# Patient Record
Sex: MALE | Race: WHITE
[De-identification: names, ages, dates, MRNs, and addresses within clinical notes are randomized per-mention and may not be internally consistent; named-entity substitution may affect disease eponyms.]

---

## 2017-04-20 ENCOUNTER — HOSPITAL ENCOUNTER (EMERGENCY)
Dept: HOSPITAL 83 - ED | Age: 82
Discharge: OTHER INSTITUTION | End: 2017-04-20
Payer: MEDICARE

## 2017-04-20 VITALS — BODY MASS INDEX: 23.03 KG/M2 | WEIGHT: 170 LBS | HEIGHT: 71.97 IN

## 2017-04-20 DIAGNOSIS — M48.56XA: Primary | ICD-10-CM

## 2017-04-20 DIAGNOSIS — Z87.442: ICD-10-CM

## 2017-04-20 DIAGNOSIS — Z79.899: ICD-10-CM

## 2017-04-20 DIAGNOSIS — F03.90: ICD-10-CM

## 2017-04-20 LAB
ALBUMIN SERPL-MCNC: 3.8 GM/DL (ref 3.1–4.5)
ALP SERPL-CCNC: 106 U/L (ref 45–117)
ALT SERPL W P-5'-P-CCNC: 17 U/L (ref 12–78)
AST SERPL-CCNC: 13 IU/L (ref 3–35)
BASOPHILS # BLD AUTO: 0.1 10*3/UL (ref 0–0.1)
BASOPHILS NFR BLD AUTO: 0.7 % (ref 0–1)
BUN SERPL-MCNC: 20 MG/DL (ref 7–24)
CHLORIDE SERPL-SCNC: 110 MMOL/L (ref 98–107)
CO2 SERPL-SCNC: 27 MMOL/L (ref 21–32)
EOSINOPHIL # BLD AUTO: 0.6 10*3/UL (ref 0–0.4)
EOSINOPHIL # BLD AUTO: 4.9 % (ref 1–4)
ERYTHROCYTE [DISTWIDTH] IN BLOOD BY AUTOMATED COUNT: 14.1 % (ref 0–14.5)
GLUCOSE SERPL-MCNC: 90 MG/DL (ref 65–99)
HCT VFR BLD AUTO: 43.3 % (ref 42–52)
HGB BLD-MCNC: 14.8 G/DL (ref 14–18)
IG #: 0.1 10*3/UL (ref 0–0.1)
LYMPHOCYTES # BLD AUTO: 2.6 10*3/UL (ref 1.3–4.4)
LYMPHOCYTES NFR BLD AUTO: 20.7 % (ref 27–41)
MCH RBC QN AUTO: 31.1 PG (ref 27–31)
MCHC RBC AUTO-ENTMCNC: 34.2 G/DL (ref 33–37)
MCV RBC AUTO: 91 FL (ref 80–94)
MONOCYTES # BLD AUTO: 1.5 10*3/UL (ref 0.1–1)
MONOCYTES NFR BLD MANUAL: 11.6 % (ref 3–9)
NEUT #: 7.7 10*3/UL (ref 2.3–7.9)
NEUT %: 61.4 % (ref 47–73)
NRBC BLD QL AUTO: 0 10*3/UL (ref 0–0)
PLATELET # BLD AUTO: 320 10*3/UL (ref 130–400)
PMV BLD AUTO: 9.9 FL (ref 9.6–12.3)
POTASSIUM SERPL-SCNC: 4.4 MMOL/L (ref 3.5–5.1)
PROT SERPL-MCNC: 6.5 GM/DL (ref 6.4–8.2)
RBC # BLD AUTO: 4.76 10*6/UL (ref 4.5–5.9)
SODIUM SERPL-SCNC: 143 MMOL/L (ref 136–145)
WBC NRBC COR # BLD AUTO: 12.6 10*3/UL (ref 4.8–10.8)

## 2017-05-04 ENCOUNTER — HOSPITAL ENCOUNTER (EMERGENCY)
Dept: HOSPITAL 83 - ED | Age: 82
Discharge: HOME | End: 2017-05-04
Payer: MEDICARE

## 2017-05-04 VITALS — HEIGHT: 71.97 IN | WEIGHT: 134 LBS | BODY MASS INDEX: 18.15 KG/M2

## 2017-05-04 DIAGNOSIS — Z79.899: ICD-10-CM

## 2017-05-04 DIAGNOSIS — F03.90: ICD-10-CM

## 2017-05-04 DIAGNOSIS — M54.5: Primary | ICD-10-CM

## 2017-05-04 DIAGNOSIS — Z87.442: ICD-10-CM

## 2017-06-27 ENCOUNTER — HOSPITAL ENCOUNTER (OUTPATIENT)
Dept: HOSPITAL 83 - RAD | Age: 82
Discharge: HOME | End: 2017-06-27
Attending: FAMILY MEDICINE
Payer: MEDICARE

## 2017-06-27 DIAGNOSIS — M80.88XS: Primary | ICD-10-CM
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
## 2017-09-12 VITALS — DIASTOLIC BLOOD PRESSURE: 67 MMHG

## 2017-09-19 ENCOUNTER — HOSPITAL ENCOUNTER (OUTPATIENT)
Dept: HOSPITAL 83 - INJECTION | Age: 82
Discharge: HOME | End: 2017-09-19
Attending: FAMILY MEDICINE
Payer: MEDICARE

## 2017-09-19 VITALS — DIASTOLIC BLOOD PRESSURE: 50 MMHG

## 2017-09-19 DIAGNOSIS — M80.88XS: Primary | ICD-10-CM

## 2018-03-27 ENCOUNTER — HOSPITAL ENCOUNTER (OUTPATIENT)
Dept: HOSPITAL 83 - INJECTION | Age: 83
Discharge: HOME | End: 2018-03-27
Attending: PSYCHIATRY & NEUROLOGY
Payer: MEDICARE

## 2018-03-27 VITALS — DIASTOLIC BLOOD PRESSURE: 66 MMHG
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
## 2018-03-27 DIAGNOSIS — M80.88XS: Primary | ICD-10-CM

## 2018-09-25 ENCOUNTER — HOSPITAL ENCOUNTER (OUTPATIENT)
Dept: HOSPITAL 83 - INJECTION | Age: 83
Discharge: HOME | End: 2018-09-25
Attending: FAMILY MEDICINE
Payer: MEDICARE

## 2018-09-25 VITALS — DIASTOLIC BLOOD PRESSURE: 55 MMHG | SYSTOLIC BLOOD PRESSURE: 104 MMHG

## 2018-09-25 DIAGNOSIS — M81.0: Primary | ICD-10-CM

## 2018-09-25 DIAGNOSIS — F03.90: ICD-10-CM

## 2019-03-26 ENCOUNTER — HOSPITAL ENCOUNTER (OUTPATIENT)
Dept: HOSPITAL 83 - INJECTION | Age: 84
Discharge: HOME | End: 2019-03-26
Attending: FAMILY MEDICINE
Payer: MEDICARE

## 2019-03-26 VITALS — DIASTOLIC BLOOD PRESSURE: 64 MMHG | SYSTOLIC BLOOD PRESSURE: 127 MMHG

## 2019-03-26 DIAGNOSIS — M81.0: Primary | ICD-10-CM

## 2019-09-27 ENCOUNTER — HOSPITAL ENCOUNTER (OUTPATIENT)
Dept: HOSPITAL 83 - INJECTION | Age: 84
Discharge: HOME | End: 2019-09-27
Attending: FAMILY MEDICINE
Payer: MEDICARE

## 2019-09-27 VITALS — DIASTOLIC BLOOD PRESSURE: 71 MMHG

## 2019-09-27 DIAGNOSIS — M81.0: Primary | ICD-10-CM

## 2020-06-07 LAB — URINE CULTURE, ROUTINE: NORMAL

## 2020-07-08 LAB
T4 FREE: 1.5 NG/DL (ref 0.76–1.46)
TSH SERPL DL<=0.05 MIU/L-ACNC: 0.14 UIU/ML (ref 0.36–4.75)
VITAMIN D 25-HYDROXY: 51.8 NG/ML (ref 30–100)

## 2020-07-15 LAB
ALBUMIN: 3 GM/DL (ref 3.1–4.5)
ALP BLD-CCNC: 88 U/L (ref 45–117)
ALT SERPL-CCNC: 10 U/L (ref 12–78)
AST SERPL-CCNC: 11 IU/L (ref 3–35)
BILIRUB SERPL-MCNC: 0.6 MG/DL (ref 0.2–1)
BUN BLDV-MCNC: 16 MG/DL (ref 7–24)
CALCIUM SERPL-MCNC: 8.2 MG/DL (ref 8.5–10.5)
CHLORIDE BLD-SCNC: 109 MMOL/L (ref 98–107)
CHOLESTEROL: 156 MG/DL
CO2: 28 MMOL/L (ref 21–32)
CREAT SERPL-MCNC: 0.76 MG/DL (ref 0.7–1.3)
GFR AFRICAN AMERICAN: > 60 ML/MIN
GFR SERPL CREATININE-BSD FRML MDRD: >60 ML/MIN/
GLUCOSE: 73 MG/DL (ref 65–99)
HCT VFR BLD CALC: 41.1 % (ref 42–52)
HDLC SERPL-MCNC: 40 MG/DL (ref 40–60)
HEMOGLOBIN: 13.3 G/DL (ref 14–18)
LDL CHOLESTEROL: 101 MG/DL (ref 9–159)
MCH RBC QN AUTO: 30.2 PG (ref 27–31)
MCHC RBC AUTO-ENTMCNC: 32.4 G/DL (ref 33–37)
MCV RBC AUTO: 93.2 FL (ref 80–94)
NUCLEATED RED BLOOD CELLS: 0 % (ref 0–0)
PDW BLD-RTO: 13.9 % (ref 0–14.5)
PLATELET # BLD: 321 10*3/UL (ref 130–400)
PMV BLD AUTO: 10.8 FL (ref 9.6–12.3)
POTASSIUM SERPL-SCNC: 3.6 MMOL/L (ref 3.5–5.1)
RBC # BLD: 4.41 10*6/UL (ref 4.5–5.9)
SODIUM BLD-SCNC: 141 MMOL/L (ref 136–145)
T4 FREE: 1.53 NG/DL (ref 0.76–1.46)
TOTAL PROTEIN: 5.7 GM/DL (ref 6.4–8.2)
TRIGL SERPL-MCNC: 77 MG/DL
TSH SERPL DL<=0.05 MIU/L-ACNC: 0.15 UIU/ML (ref 0.36–4.75)
VITAMIN D 25-HYDROXY: 56.5 NG/ML (ref 30–100)
VLDLC SERPL CALC-MCNC: 15 MG/DL (ref 6–40)
WBC # BLD: 8.8 10*3/UL (ref 4.8–10.8)

## 2020-07-30 RX ORDER — TRAMADOL HYDROCHLORIDE 50 MG/1
50 TABLET ORAL EVERY 6 HOURS PRN
Qty: 120 TABLET | Refills: 0 | Status: SHIPPED | OUTPATIENT
Start: 2020-07-30 | End: 2020-08-29

## 2020-07-31 LAB — URINE CULTURE, ROUTINE: NORMAL

## 2020-08-08 ENCOUNTER — HOSPITAL ENCOUNTER (EMERGENCY)
Dept: HOSPITAL 83 - ED | Age: 85
LOS: 1 days | Discharge: OTHER INSTITUTION | End: 2020-08-09
Payer: MEDICARE

## 2020-08-08 VITALS — BODY MASS INDEX: 22.73 KG/M2 | HEIGHT: 67.99 IN | WEIGHT: 150 LBS

## 2020-08-08 DIAGNOSIS — I10: ICD-10-CM

## 2020-08-08 DIAGNOSIS — M10.9: ICD-10-CM

## 2020-08-08 DIAGNOSIS — E86.0: ICD-10-CM

## 2020-08-08 DIAGNOSIS — E03.9: ICD-10-CM

## 2020-08-08 DIAGNOSIS — K59.00: Primary | ICD-10-CM

## 2020-08-08 DIAGNOSIS — Z79.899: ICD-10-CM

## 2020-08-09 LAB
ALBUMIN SERPL-MCNC: 3.2 GM/DL (ref 3.1–4.5)
ALP SERPL-CCNC: 124 U/L (ref 45–117)
ALT SERPL W P-5'-P-CCNC: 15 U/L (ref 12–78)
APPEARANCE UR: CLEAR
AST SERPL-CCNC: 18 IU/L (ref 3–35)
BASOPHILS # BLD AUTO: 0.1 10*3/UL (ref 0–0.1)
BASOPHILS NFR BLD AUTO: 0.6 % (ref 0–1)
BILIRUB UR QL STRIP: NEGATIVE
BUN SERPL-MCNC: 15 MG/DL (ref 7–24)
CHLORIDE SERPL-SCNC: 107 MMOL/L (ref 98–107)
COLOR UR: YELLOW
CREAT SERPL-MCNC: 0.78 MG/DL (ref 0.7–1.3)
EOSINOPHIL # BLD AUTO: 0.4 10*3/UL (ref 0–0.4)
EOSINOPHIL # BLD AUTO: 2.9 % (ref 1–4)
ERYTHROCYTE [DISTWIDTH] IN BLOOD BY AUTOMATED COUNT: 13.9 % (ref 0–14.5)
GLUCOSE UR QL: NEGATIVE
HCT VFR BLD AUTO: 45 % (ref 42–52)
HGB UR QL STRIP: NEGATIVE
KETONES UR QL STRIP: (no result)
LEUKOCYTE ESTERASE UR QL STRIP: NEGATIVE
LYMPHOCYTES # BLD AUTO: 2.6 10*3/UL (ref 1.3–4.4)
LYMPHOCYTES NFR BLD AUTO: 20.1 % (ref 27–41)
MCH RBC QN AUTO: 30 PG (ref 27–31)
MCHC RBC AUTO-ENTMCNC: 33.3 G/DL (ref 33–37)
MCV RBC AUTO: 90 FL (ref 80–94)
MONOCYTES # BLD AUTO: 1.5 10*3/UL (ref 0.1–1)
MONOCYTES NFR BLD MANUAL: 11.3 % (ref 3–9)
NEUT #: 8.3 10*3/UL (ref 2.3–7.9)
NEUT %: 64 % (ref 47–73)
NITRITE UR QL STRIP: NEGATIVE
NRBC BLD QL AUTO: 0 10*3/UL (ref 0–0)
PH UR STRIP: 7 [PH] (ref 5–9)
PLATELET # BLD AUTO: 431 10*3/UL (ref 130–400)
PMV BLD AUTO: 10.1 FL (ref 9.6–12.3)
POTASSIUM SERPL-SCNC: 3.5 MMOL/L (ref 3.5–5.1)
PROT SERPL-MCNC: 6.8 GM/DL (ref 6.4–8.2)
RBC # BLD AUTO: 5 10*6/UL (ref 4.5–5.9)
RBC #/AREA URNS HPF: (no result) RBC/HPF (ref 0–2)
SODIUM SERPL-SCNC: 136 MMOL/L (ref 136–145)
SP GR UR: 1.01 (ref 1–1.03)
UROBILINOGEN UR STRIP-MCNC: 2 E.U./DL (ref 0.2–1)
WBC #/AREA URNS HPF: (no result) WBC/HPF (ref 0–5)
WBC NRBC COR # BLD AUTO: 12.9 10*3/UL (ref 4.8–10.8)

## 2020-08-12 ENCOUNTER — OUTSIDE SERVICES (OUTPATIENT)
Dept: FAMILY MEDICINE CLINIC | Age: 85
End: 2020-08-12
Payer: MEDICARE

## 2020-08-12 ENCOUNTER — HOSPITAL ENCOUNTER (OUTPATIENT)
Dept: HOSPITAL 83 - ED | Age: 85
Setting detail: OBSERVATION
LOS: 2 days | Discharge: NURSING, REHAB OR LONG-TERM CARE | End: 2020-08-14
Attending: STUDENT IN AN ORGANIZED HEALTH CARE EDUCATION/TRAINING PROGRAM | Admitting: STUDENT IN AN ORGANIZED HEALTH CARE EDUCATION/TRAINING PROGRAM
Payer: MEDICARE

## 2020-08-12 VITALS
BODY MASS INDEX: 26.5 KG/M2 | HEART RATE: 72 BPM | HEIGHT: 62 IN | SYSTOLIC BLOOD PRESSURE: 131 MMHG | WEIGHT: 144 LBS | DIASTOLIC BLOOD PRESSURE: 73 MMHG | TEMPERATURE: 98.9 F | RESPIRATION RATE: 18 BRPM

## 2020-08-12 VITALS — WEIGHT: 136.19 LBS | BODY MASS INDEX: 20.64 KG/M2 | HEIGHT: 68 IN

## 2020-08-12 VITALS — DIASTOLIC BLOOD PRESSURE: 78 MMHG | SYSTOLIC BLOOD PRESSURE: 125 MMHG

## 2020-08-12 DIAGNOSIS — Z03.818: Primary | ICD-10-CM

## 2020-08-12 DIAGNOSIS — E55.9: ICD-10-CM

## 2020-08-12 DIAGNOSIS — E87.8: ICD-10-CM

## 2020-08-12 DIAGNOSIS — E83.41: ICD-10-CM

## 2020-08-12 DIAGNOSIS — I10: ICD-10-CM

## 2020-08-12 DIAGNOSIS — D47.3: ICD-10-CM

## 2020-08-12 DIAGNOSIS — M54.5: ICD-10-CM

## 2020-08-12 DIAGNOSIS — E03.9: ICD-10-CM

## 2020-08-12 DIAGNOSIS — D72.89: ICD-10-CM

## 2020-08-12 DIAGNOSIS — E44.0: ICD-10-CM

## 2020-08-12 DIAGNOSIS — E87.6: ICD-10-CM

## 2020-08-12 DIAGNOSIS — M10.9: ICD-10-CM

## 2020-08-12 DIAGNOSIS — F03.90: ICD-10-CM

## 2020-08-12 PROBLEM — Z87.19 HISTORY OF SMALL BOWEL OBSTRUCTION: Status: ACTIVE | Noted: 2020-08-12

## 2020-08-12 PROBLEM — R10.9 CONTINUOUS SEVERE ABDOMINAL PAIN: Status: ACTIVE | Noted: 2020-08-12

## 2020-08-12 PROBLEM — M81.0 OSTEOPOROSIS: Status: ACTIVE | Noted: 2020-08-12

## 2020-08-12 PROBLEM — K59.00 CONSTIPATION: Status: ACTIVE | Noted: 2020-08-12

## 2020-08-12 PROBLEM — Z87.81 HISTORY OF COMPRESSION FRACTURE OF SPINE: Status: ACTIVE | Noted: 2020-08-12

## 2020-08-12 PROBLEM — K57.92 DIVERTICULITIS: Status: ACTIVE | Noted: 2020-08-12

## 2020-08-12 PROBLEM — M54.50 SEVERE LUMBAR PAIN: Status: ACTIVE | Noted: 2020-08-12

## 2020-08-12 LAB
BASOPHILS # BLD AUTO: 1 % (ref 0–1)
BURR CELLS BLD QL SMEAR: (no result)
ERYTHROCYTE [DISTWIDTH] IN BLOOD BY AUTOMATED COUNT: 13.6 % (ref 0–14.5)
HCT VFR BLD AUTO: 44.4 % (ref 42–52)
INR BLD: 1.1 (ref 2–3.5)
MCH RBC QN AUTO: 29.1 PG (ref 27–31)
MCHC RBC AUTO-ENTMCNC: 32.2 G/DL (ref 33–37)
MCV RBC AUTO: 90.2 FL (ref 80–94)
NRBC BLD QL AUTO: 0 10*3/UL (ref 0–0)
PLATELET # BLD AUTO: 498 10*3/UL (ref 130–400)
PLATELET SUFFICIENCY: (no result)
PMV BLD AUTO: 10.2 FL (ref 9.6–12.3)
RBC # BLD AUTO: 4.92 10*6/UL (ref 4.5–5.9)
TOTAL CELLS COUNTED: 100 #CELLS
WBC NRBC COR # BLD AUTO: 16 10*3/UL (ref 4.8–10.8)

## 2020-08-12 ASSESSMENT — ENCOUNTER SYMPTOMS
BACK PAIN: 1
RESPIRATORY NEGATIVE: 1
EYES NEGATIVE: 1
ABDOMINAL PAIN: 1
ABDOMINAL DISTENTION: 1
ALLERGIC/IMMUNOLOGIC NEGATIVE: 1
CONSTIPATION: 1

## 2020-08-12 NOTE — NUR
PATIENT ARRIVED TO ROOM VIA WHEELCHAIR. PATIENT VERY UPSET AND REPEATED
MULTIPLE TIMES "I AM VERY ANGRY AT THE TREATMENT THAT I'M RECEIVING HERE."
WHEN ASKED WHERE HE WAS AT THIS TIME HE WAS UNABLE/UNWILLING TO ANSWER, WHEN
PATIENT WAS ADVISED THAT HE WAS AT [facility] HE BECAME AGGITATED
AND STATED "OH YOU THINK SO DO YOU, WELL YOU GO AHEAD AND BELIEVE THAT."
PATIENT DID ANSWER SOME OF THE ADMISSION QUESTIONS AND CALMED DOWN THEN BECAME
AGGITATED AGAIN WHEN ASKED IF HIS VITAL SIGNS COULD BE TAKEN. PATIENT
COMPLAINING OF ROOM BEING COLD AND WAS SHOWN THE THEMOSTAT WAS TURNED UP AND
WAS PROVIDED WITH WARM BLANKETS. PATIENT WOULD NOT ALLOW RN OR AIDE TO REMOVE
HIS SHIRT TO ASSESS HIS SKIN, BECAME AGGITATED AND STRUCK OUT AT RN AND AIDE
AND STATED TO "LEAVE HIM ALONE, YOU CAN'T LOOK AT WHATEVER YOU WANT I'M VERY
ANGRY THAT YOU KEEP BOTHERING ME." ATTEMPTED TO CALM AND EXPLAIN TO PATIENT
REASON THAT WE CHECK ALL ADMISSIONS FOR WOUNDS HE CONTINUE TO REFUSE. PATIENT
REFUSED TO SIGN ANY OF THE ADMISSION PAPERWORK AT THIS TIME. CALL LIGHT
EXPLAINED TO PATIENT, WILL CONTINUE TO MONITOR.

## 2020-08-12 NOTE — NUR
PT AT NURSE'S STATION REPORTING HE IS UPSET DUE TO BEING IN THE DEPARTMENT FOR
6 HOURS. PT NOTES HE HAS BEEN CALLING OUT FOR HOURS AND HAS HAD NO HELP. PT
REMINDED THAT CALL LIGHT IS ON BED RAILING. PT REORIENTED TO PLACE, TIME, AND
REASON FOR VISIT. PT NOW IN BED, CALL LIGHT WITHIN REACH. WILL CONTINUE TO
MONITOR.

## 2020-08-12 NOTE — NUR
PATIENT REFUSED FLEXERIL AT THIS TIME. STATED "WHAT ARE YOU GOING TO DO KEEP
ME AWAKE ALL NIGHT? WHAT KIND OF PLACE IS THIS?." REORIENTED PATIENT TO PLACE
AND TIME, ADVISED HIM THAT AS THIS WAS A HOSPITAL AND HE HAD JUST RECENTLY
ARRIVED TO THE FLOOR THAT THE DOCTORS WERE ORDERING MEDICATIONS FOR HIS BACK
PAIN AND PER HIS LAB WORK. HE REFUSED AND STATED "I'M NOT TAKING ANY MEDS NOW
OR THE REST OF THE NIGHT, DON'T COME BACK IN HERE OR I WILL THROW THIS WATER
OR WHATEVER I GET A HOLD OF AT YOU." DR. SANCHEZ MADE AWARE. WILL CONTINUE TO
MONITOR.

## 2020-08-12 NOTE — NUR
PATIENT REMOVED IV EARLIER AND WILL NOT ALLOW ANOTHER ONE TO BE STARTED AT
THIS TIME. IV SOLUMEDROL NOT GIVEN. DR. SANCHEZ AWARE.

## 2020-08-12 NOTE — NUR
A 86, admitted to , under the
services of EHSAN Hurd DO with a diagnosis of BACK PAIN, UNABLE TO
AMBULATE.
Chief complaint is LOWER BACK PAIN.
Patient arrived via wheel chair from ER.
Initial assessment completed.
Vital signs REFUSED BY PATIENT.
DR. MALCOLM ALTAMIRANO notified of admission to the unit.
Orders received.
See assessment for past medical history, medications
and allergies.
Patient and/or family oriented to [facility].
visitation policy reviewed.
Clothing/patient valuable form completed.
 
HANNAH DELUCA RN

## 2020-08-12 NOTE — NUR
PATIENT REFUSING TO RADHA RN TO OBTAIN COVID-19 SWAB AT THIS TIME AFTER
EXPLAINING PROCEDURE TO HIM AND REASON IT NEEDS TO BE DONE. DR. SANCHEZ AWARE.

## 2020-08-12 NOTE — PROGRESS NOTES
Subjective:      Patient ID: Hermelinda Penaloza is a 80 y.o. male. Patient seen today as an acute basis per nursing staff. Staff reports that patient had a recent emergency room visit for complaints of severe abdominal and lower back pain. Staff reported that patient returned to the facility with a diagnosis of constipation. Patient has had continuous severe pain in the lower back and abdomen since his return from the emergency department. Patient is taking tramadol, which does help relieve the pain symptoms. Patient complains of constipation and problems moving his bowels. Patient also has significant dementia and has problems with his memory. Review of Systems   Constitutional: Negative. Negative for activity change, appetite change, chills, diaphoresis, fatigue, fever and unexpected weight change. HENT: Negative. Eyes: Negative. Respiratory: Negative. Cardiovascular: Negative. Gastrointestinal: Positive for abdominal distention, abdominal pain and constipation. Endocrine: Negative. Genitourinary: Negative. Musculoskeletal: Positive for back pain. Skin: Negative. Allergic/Immunologic: Negative. Neurological: Negative. Hematological: Negative. Psychiatric/Behavioral: Positive for agitation and confusion. Objective:   Physical Exam  Vitals signs and nursing note reviewed. Constitutional:       General: He is not in acute distress. Appearance: Normal appearance. He is normal weight. He is not ill-appearing, toxic-appearing or diaphoretic. HENT:      Head: Normocephalic. Right Ear: Tympanic membrane, ear canal and external ear normal. There is no impacted cerumen. Left Ear: Tympanic membrane, ear canal and external ear normal. There is no impacted cerumen. Nose: Nose normal. No congestion or rhinorrhea. Mouth/Throat:      Mouth: Mucous membranes are moist.      Pharynx: Oropharynx is clear.  No oropharyngeal exudate or posterior oropharyngeal erythema. Eyes:      General: No scleral icterus. Right eye: No discharge. Left eye: No discharge. Extraocular Movements: Extraocular movements intact. Conjunctiva/sclera: Conjunctivae normal.      Pupils: Pupils are equal, round, and reactive to light. Neck:      Musculoskeletal: Normal range of motion and neck supple. No neck rigidity or muscular tenderness. Vascular: No carotid bruit. Cardiovascular:      Rate and Rhythm: Normal rate and regular rhythm. Pulses: Normal pulses. Heart sounds: Murmur present. No friction rub. No gallop. Pulmonary:      Effort: Pulmonary effort is normal. No respiratory distress. Breath sounds: Normal breath sounds. No stridor. No wheezing, rhonchi or rales. Chest:      Chest wall: No tenderness. Abdominal:      General: Bowel sounds are normal. There is distension. Palpations: Abdomen is soft. There is no mass. Tenderness: There is abdominal tenderness. There is no right CVA tenderness, left CVA tenderness, guarding or rebound. Hernia: No hernia is present. Comments: Softly distended abdomen. Musculoskeletal: Normal range of motion. General: No swelling, tenderness, deformity or signs of injury. Right lower leg: No edema. Left lower leg: No edema. Lymphadenopathy:      Cervical: No cervical adenopathy. Skin:     General: Skin is warm and dry. Capillary Refill: Capillary refill takes less than 2 seconds. Coloration: Skin is not jaundiced or pale. Findings: No bruising, erythema, lesion or rash. Neurological:      General: No focal deficit present. Mental Status: He is alert. He is disoriented. Cranial Nerves: No cranial nerve deficit. Sensory: No sensory deficit. Motor: Weakness present.       Coordination: Coordination normal.      Gait: Gait normal.   Psychiatric:         Behavior: Behavior normal.      Comments: Slightly anxious /73   Pulse 72   Temp 98.9 °F (37.2 °C)   Resp 18   Ht 5' 2\" (1.575 m)   Wt 144 lb (65.3 kg)   BMI 26.34 kg/m²     Assessment:       Diagnosis Orders   1. Continuous severe abdominal pain     2. Severe lumbar pain     3. Constipation, unspecified constipation type     4. History of small bowel obstruction     5. History of compression fracture of spine     6. Dementia without behavioral disturbance, unspecified dementia type ([address]     7. Hypothyroidism, unspecified type     8. Diverticulitis     9. Hypertension, unspecified type     10. Osteoporosis, unspecified osteoporosis type, unspecified pathological fracture presence             Plan:      Reviewed medication and plan of care. Continue medication and plan of care. Xray of the lumbar spine due to pain. KUB of abdomen due to pain. Xrays were ordered stat. Provide pain medication for pain. Monitor for fever, nausea or vomiting. Follow up PRN. Staff to advise Dr Jovan Gambino or NP of any changes or worsening of patient condition.         Martin Christina, CHARITY - CNP

## 2020-08-12 NOTE — NUR
DR. ALTAMIRANO IN ROOM TO ASSESS PATIENT AND HE ADVISED THIS RN THAT THE
PATIENT HAD JUST PULLED OUT HIS IV AND THREW IT ON THE BEDSIDE TABLE. ORDERS
NOT TO REPLACE IV AT THIS TIME. PATIENT WOULD NOT ALLOW RN TO ASSESS SITE FOR
BLEEDING AT THIS TIME. PATIENT STATED "HOW DO YOU EXPECT SOMEONE TO SLEEP WITH
THAT KIND OF STUFF IN THEM? GET OUT OF HERE AND LEAVE ME BE." WILL MONITOR.

## 2020-08-13 VITALS — DIASTOLIC BLOOD PRESSURE: 44 MMHG | SYSTOLIC BLOOD PRESSURE: 111 MMHG

## 2020-08-13 VITALS — DIASTOLIC BLOOD PRESSURE: 74 MMHG | SYSTOLIC BLOOD PRESSURE: 99 MMHG

## 2020-08-13 VITALS — DIASTOLIC BLOOD PRESSURE: 64 MMHG

## 2020-08-13 VITALS — DIASTOLIC BLOOD PRESSURE: 81 MMHG

## 2020-08-13 LAB
ALBUMIN SERPL-MCNC: 3.1 GM/DL (ref 3.1–4.5)
ALP SERPL-CCNC: 122 U/L (ref 45–117)
ALT SERPL W P-5'-P-CCNC: 18 U/L (ref 12–78)
AST SERPL-CCNC: 21 IU/L (ref 3–35)
BUN SERPL-MCNC: 15 MG/DL (ref 7–24)
BUN SERPL-MCNC: 15 MG/DL (ref 7–24)
BURR CELLS BLD QL SMEAR: (no result)
CHLORIDE SERPL-SCNC: 109 MMOL/L (ref 98–107)
CHLORIDE SERPL-SCNC: 109 MMOL/L (ref 98–107)
CREAT SERPL-MCNC: 0.63 MG/DL (ref 0.7–1.3)
CREAT SERPL-MCNC: 0.72 MG/DL (ref 0.7–1.3)
ERYTHROCYTE [DISTWIDTH] IN BLOOD BY AUTOMATED COUNT: 13.8 % (ref 0–14.5)
HCT VFR BLD AUTO: 42.3 % (ref 42–52)
MCH RBC QN AUTO: 29.3 PG (ref 27–31)
MCHC RBC AUTO-ENTMCNC: 32.6 G/DL (ref 33–37)
MCV RBC AUTO: 89.8 FL (ref 80–94)
NRBC BLD QL AUTO: 0 % (ref 0–0)
PLATELET # BLD AUTO: 470 10*3/UL (ref 130–400)
PLATELET SUFFICIENCY: (no result)
PMV BLD AUTO: 10.2 FL (ref 9.6–12.3)
POTASSIUM SERPL-SCNC: 3.2 MMOL/L (ref 3.5–5.1)
POTASSIUM SERPL-SCNC: 3.4 MMOL/L (ref 3.5–5.1)
PROT SERPL-MCNC: 6.7 GM/DL (ref 6.4–8.2)
RBC # BLD AUTO: 4.71 10*6/UL (ref 4.5–5.9)
SODIUM SERPL-SCNC: 140 MMOL/L (ref 136–145)
SODIUM SERPL-SCNC: 143 MMOL/L (ref 136–145)
TOTAL CELLS COUNTED: 100 #CELLS
WBC NRBC COR # BLD AUTO: 13.6 10*3/UL (ref 4.8–10.8)

## 2020-08-13 NOTE — NUR
OT NOTE
Occupational therapy order received and chart reviewed. Attempted to see
patient this AM however per discussion with nurse OT to hold on therapy until
patient eats his breakfast. OTR into room and patient self-feeding with no
difficulty. Will check back at a later time for completion of an OT eval.
Thank you.
 
Julia Hearn, OTR/L

## 2020-08-13 NOTE — NUR
Spoke to son, Dr. Surjit Jamison, regarding discharge planning. Patient is from
[facility]. Discussed short term rehab and he declines. He
wishes for him to return to [facility] with therapy. He has dementia and the
son's concern is sending him to another facility will spiral him. He has been
at [facility] for 3.5 years and the girls there know and can help him and see
how he is progressing. He states he has been in contact with [facility]
prior to patient coming into the hospital and that could potentially be needed
for further assistance at [facility].  and hospitalist nurse
director notified. Son can provide transportation on discharge.

## 2020-08-13 NOTE — NUR
Occupational Therapy evaluation completed on five with full evaluation to
follow.  Recommend occupational therapy per plan of care and SNF upon
discharge.  Thank you for this referral.
 
Julia Hearn OTR/L

## 2020-08-13 NOTE — NUR
IV started right forearm with # protective cath after 1  attempts.
Site prepped with Chloroprep.
Sterile dressing applied. Patient tolerated procedure well.
 
HANNAH PEREZ

## 2020-08-13 NOTE — NUR
PATIENT REFUSED DELTASONE AND K-DUR AT THIS TIME. WOULD NOT ALLOW RN IN THE
ROOM, STATED "I TOLD YOU I DON'T WANT ANYTHING ELSE TONIGHT, GET OUT AND LEAVE
ME SLEEP." WILL MONITOR.

## 2020-08-13 NOTE — NUR
NORCO 5/ 325 MG GIVEN FOR C/O PAIN TO LOWER BACK.PT REQUIRES FREQUENT
REORIENTATION. ENCOURAGED PT TO RETURN TO CHAIR AND REATTACHED CHAIR ALARM. PT
PROVIDED EDUCATION REGARDING REASONS FOR BED/CHAIR ALARMS.

## 2020-08-13 NOTE — NUR
PHYSICAL THERAPY
 
Physical Therapy evaluation completed on 5th floor with full evaluation to
follow.  Recommend physical therapy per plan of care and SNF upon discharge.
Thank you for this referral.
 
Adrian Goncalves SPT
Tania Means PT

## 2020-08-13 NOTE — NUR
NOTIFIED DR SANCHEZ PT RIPPED OUT IV AND THROUGH IT ACROSS THE ROOM AND HIT ME IN
FACE WITH IT.  PT NOW BELLIGERENY. REFUSES TO KEEP ALARM ON AND DESPITE
FREQUENT REORIENTATION. PT EXTREMELY AGITATED.STEPPED OUT OF PT ROOM. PT
QUIETED AT THIS TIME.

## 2020-08-13 NOTE — NUR
PT BECOMING INCREASINGLY CONFUSED AND BELLIGERENT. UNABLE TO REORIENT AT THIS
TIME. FREQUENTLY REMINDED PT TO LOOK AT HIS NOTE IF HE FELT CONFUSED. CHAIR
ALARM INTACT. PT REFUSING TO RETURN TO BED AT THIS TIME. WILL CONTINUE TO
CLOSELY MONITOR.CALL LIGHT WITHIN REACH.

## 2020-08-13 NOTE — NUR
PHYSICAL THERAPY
 
PT evaluation attempted, pt was occupied by eating breakfast at this time.
Will try again at a later time. Thank you.
 
Adrian Goncalves SPT
Tania Means PT

## 2020-08-14 VITALS — DIASTOLIC BLOOD PRESSURE: 76 MMHG

## 2020-08-14 VITALS — DIASTOLIC BLOOD PRESSURE: 70 MMHG

## 2020-08-14 LAB
BUN SERPL-MCNC: 25 MG/DL (ref 7–24)
BURR CELLS BLD QL SMEAR: (no result)
CHLORIDE SERPL-SCNC: 108 MMOL/L (ref 98–107)
CREAT SERPL-MCNC: 0.92 MG/DL (ref 0.7–1.3)
ERYTHROCYTE [DISTWIDTH] IN BLOOD BY AUTOMATED COUNT: 14.1 % (ref 0–14.5)
HCT VFR BLD AUTO: 46.8 % (ref 42–52)
MCH RBC QN AUTO: 29.7 PG (ref 27–31)
MCHC RBC AUTO-ENTMCNC: 32.1 G/DL (ref 33–37)
MCV RBC AUTO: 92.7 FL (ref 80–94)
NRBC BLD QL AUTO: 0 10*3/UL (ref 0–0)
PLATELET # BLD AUTO: 554 10*3/UL (ref 130–400)
PLATELET SUFFICIENCY: (no result)
PMV BLD AUTO: 10.4 FL (ref 9.6–12.3)
POLYCHROMASIA BLD QL SMEAR: SLIGHT
POTASSIUM SERPL-SCNC: 3.6 MMOL/L (ref 3.5–5.1)
RBC # BLD AUTO: 5.05 10*6/UL (ref 4.5–5.9)
SODIUM SERPL-SCNC: 139 MMOL/L (ref 136–145)
TOTAL CELLS COUNTED: 100 #CELLS
WBC NRBC COR # BLD AUTO: 14 10*3/UL (ref 4.8–10.8)

## 2020-08-14 NOTE — NUR
[facility] ambulance here to  pt for dc to [facility]. Report called to
[place].

## 2020-08-14 NOTE — NUR
OCCUPATIONAL THERAPY CO-SIGN
 
I approve of the Occupational Therapy notes written above.
 
SYD LOVETT, OTR/L

## 2020-08-14 NOTE — NUR
PHYSICAL THERAPY CO-SIGN
 
 
I approve of the Physical Therapy notes written above.
 
 
 
Tania Means PT

## 2020-08-14 NOTE — NUR
W NOTIFIED OF PATIENT DISCHARGE. [facility] ARRANGED FOR [facility] TO TRANSPORT
THE PATIENT AT 1PM TODAY. [facility] NOTIFIED  ANA, ANGIES, AND PATIENTS SON
DR. NEENA ONTIVEROS.

## 2020-08-14 NOTE — NUR
Pt was up working with pt. Noted that pt has skin tear to rt arm. Site is dry
with skin flap partially covering. Blood noted but dried. Therapy states the
noted the skin tear prior to therapy.

## 2020-08-14 NOTE — NUR
PHYSICAL THERAPY
TREATMENT TIME:           OUT: 9:17 AM
Patient presented to therapy in supine with head of bed elevated and bed alarm
activated. Patient gives informed consent for treatment. Patient was
identified by name and  on wristband. Patient completed supine to sitting
at EOB with MIN A X 1. Patient sits at EOB with SBA. Patient transferred sit
to stand from EOB with MIN A X 1. Patient ambulated with Wh Walker and CGA for
50' x 1 and then again for 40' x 1 with no LOB. Patient then transferred to
bedside chair with CGA and verbal cues for putting hands back on armrests of
chair. Patient then performed sitting bilateral therapeutic exercises x 10
reps each in all directions for strengthening the LEs in order to improve
patient's functional mobility. Patient was left in bedside chair with LEs in
low position, chair alarm tested and attached to patient and call light within
reach. Patient was 1:1 with PTA for 19 minutes total.
                                            TIFFANY BATRES PTA

## 2020-08-14 NOTE — NUR
OT NOTE
Pt was seen this A.M. 1:1 for 15 minute OT session. Upon arrival pt was supine
in bed. Pt identified by name and  and had complaints of low back pain
which he was unable to rate on 0-10 pain scale. Pt transferred supine to sit
EOB with Harsh for assist with UB. Sit to stand completed from bed level with
CGA and use of w/w for UE support. Functional mobility was then completed to
the bathroom with CGA and use of w/w. There he transferred on/off standard
commode with Harsh due to low surface. He then stood sink side while washing
his hands with CGA, pt had one minor LOB that was able to be self corrected.
Functional mobility completed back to the recliner where he was left sitting
upright with call light in hand, tray table in place, and body alarm activated
for safety. Continue with rec D/C plan to SNF.
 
SERA Glez/L

## 2020-08-19 LAB
AMMONIA: < 10 UMOL/L (ref 11–32)
VITAMIN B-12: 473 PG/ML (ref 247–911)

## 2020-08-20 ENCOUNTER — OUTSIDE SERVICES (OUTPATIENT)
Dept: FAMILY MEDICINE CLINIC | Age: 85
End: 2020-08-20
Payer: MEDICARE

## 2020-08-20 VITALS
HEART RATE: 72 BPM | WEIGHT: 144 LBS | DIASTOLIC BLOOD PRESSURE: 73 MMHG | RESPIRATION RATE: 18 BRPM | HEIGHT: 68 IN | BODY MASS INDEX: 21.82 KG/M2 | TEMPERATURE: 98.9 F | SYSTOLIC BLOOD PRESSURE: 131 MMHG

## 2020-08-20 LAB
ALBUMIN: 2.6 GM/DL (ref 3.1–4.5)
ALP BLD-CCNC: 93 U/L (ref 45–117)
ALT SERPL-CCNC: 12 U/L (ref 12–78)
AST SERPL-CCNC: 9 IU/L (ref 3–35)
BILIRUB SERPL-MCNC: 0.4 MG/DL (ref 0.2–1)
BUN BLDV-MCNC: 17 MG/DL (ref 7–24)
CALCIUM SERPL-MCNC: 8.3 MG/DL (ref 8.5–10.5)
CHLORIDE BLD-SCNC: 107 MMOL/L (ref 98–107)
CO2: 29 MMOL/L (ref 21–32)
CREAT SERPL-MCNC: 0.67 MG/DL (ref 0.7–1.3)
GFR AFRICAN AMERICAN: > 60 ML/MIN
GFR SERPL CREATININE-BSD FRML MDRD: >60 ML/MIN/
GLUCOSE: 84 MG/DL (ref 65–99)
HCT VFR BLD CALC: 38.6 % (ref 42–52)
HEMOGLOBIN: 12.6 G/DL (ref 14–18)
MCH RBC QN AUTO: 29.7 PG (ref 27–31)
MCHC RBC AUTO-ENTMCNC: 32.6 G/DL (ref 33–37)
MCV RBC AUTO: 91 FL (ref 80–94)
NUCLEATED RED BLOOD CELLS: 0 % (ref 0–0)
PDW BLD-RTO: 13.7 % (ref 0–14.5)
PLATELET # BLD: 473 10*3/UL (ref 130–400)
PMV BLD AUTO: 10.4 FL (ref 9.6–12.3)
POTASSIUM SERPL-SCNC: 3.3 MMOL/L (ref 3.5–5.1)
RBC # BLD: 4.24 10*6/UL (ref 4.5–5.9)
SODIUM BLD-SCNC: 140 MMOL/L (ref 136–145)
TOTAL PROTEIN: 5.4 GM/DL (ref 6.4–8.2)
WBC # BLD: 12.9 10*3/UL (ref 4.8–10.8)

## 2020-08-20 ASSESSMENT — ENCOUNTER SYMPTOMS
ALLERGIC/IMMUNOLOGIC NEGATIVE: 1
EYES NEGATIVE: 1
RESPIRATORY NEGATIVE: 1
CONSTIPATION: 1
BACK PAIN: 1

## 2020-08-20 NOTE — PROGRESS NOTES
Subjective:      Patient ID: Evangelista South is a 80 y.o. male. Patient seen today as an acute basis per request of nursing staff. Staff reports that patient has been experiencing severe lower back pain. Patient states Tramadol is ineffective. Family is requesting MRI. Patient also having more confusion per staff. Patient recently was released from the [facility] being admitted on 8/12/2020 and discharged 8/14/2020. Patient on new medications Prednisone and Flexeril. Review of Systems   Constitutional: Negative. Negative for activity change, appetite change, chills, diaphoresis, fatigue, fever and unexpected weight change. HENT: Negative. Eyes: Negative. Respiratory: Negative. Cardiovascular: Negative. Gastrointestinal: Positive for constipation. Endocrine: Negative. Genitourinary: Negative. Musculoskeletal: Positive for back pain and gait problem. Skin: Negative. Allergic/Immunologic: Negative. Neurological: Positive for weakness. Hematological: Negative. Psychiatric/Behavioral: Positive for confusion. Objective:   Physical Exam  Vitals signs and nursing note reviewed. Constitutional:       General: He is not in acute distress. Appearance: Normal appearance. He is normal weight. He is not ill-appearing, toxic-appearing or diaphoretic. HENT:      Head: Normocephalic. Right Ear: Tympanic membrane, ear canal and external ear normal. There is no impacted cerumen. Left Ear: Tympanic membrane, ear canal and external ear normal. There is no impacted cerumen. Nose: Nose normal. No congestion or rhinorrhea. Mouth/Throat:      Mouth: Mucous membranes are dry. Pharynx: Oropharynx is clear. Posterior oropharyngeal erythema present. No oropharyngeal exudate. Eyes:      General: No scleral icterus. Right eye: No discharge. Left eye: No discharge. Extraocular Movements: Extraocular movements intact. Conjunctiva/sclera: Conjunctivae normal.      Pupils: Pupils are equal, round, and reactive to light. Neck:      Musculoskeletal: Normal range of motion and neck supple. No neck rigidity or muscular tenderness. Vascular: No carotid bruit. Cardiovascular:      Rate and Rhythm: Normal rate and regular rhythm. Pulses: Normal pulses. Heart sounds: Normal heart sounds. No murmur. No friction rub. No gallop. Pulmonary:      Effort: Pulmonary effort is normal. No respiratory distress. Breath sounds: Normal breath sounds. No stridor. No wheezing, rhonchi or rales. Chest:      Chest wall: No tenderness. Abdominal:      General: Abdomen is flat. Bowel sounds are normal. There is no distension. Palpations: Abdomen is soft. There is no mass. Tenderness: There is no abdominal tenderness. There is no right CVA tenderness, left CVA tenderness, guarding or rebound. Hernia: No hernia is present. Musculoskeletal: Normal range of motion. General: Tenderness present. No swelling, deformity or signs of injury. Right lower leg: No edema. Left lower leg: No edema. Comments: Tenderness to lower back. Reports lower back pain   Lymphadenopathy:      Cervical: No cervical adenopathy. Skin:     General: Skin is warm and dry. Capillary Refill: Capillary refill takes less than 2 seconds. Coloration: Skin is not jaundiced or pale. Findings: No bruising, erythema, lesion or rash. Neurological:      General: No focal deficit present. Mental Status: He is alert. He is disoriented. Cranial Nerves: No cranial nerve deficit. Sensory: No sensory deficit. Motor: Weakness present. Coordination: Coordination abnormal.      Gait: Gait abnormal.   Psychiatric:         Mood and Affect: Mood normal.      Comments: Confusion.        /73   Pulse 72   Temp 98.9 °F (37.2 °C)   Resp 18   Ht 5' 8\" (1.727 m)   Wt 144 lb (65.3 kg)   BMI 21.90 kg/m²       Assessment:       Diagnosis Orders   1. Severe lumbar pain     2. History of compression fracture of spine     3. Dementia without behavioral disturbance, unspecified dementia type ([address]     4. Hypothyroidism, unspecified type     5. Osteoporosis, unspecified osteoporosis type, unspecified pathological fracture presence     6. Constipation, unspecified constipation type     7. History of small bowel obstruction             Plan:      Reviewed medication and plan of care. Begin Norco 5/325 mg PO every 8 hours as needed for pain. D/C tramadol once norco is available. [facility] for MRI to lower back due to pain. Ok to refer to ortho of family choice or Dr Kenny Ibarra for back pain. CBC, CMP and U/A C&S due to confusion. Follow up in one week. Advised staff to contact Dr Grace Prater or NP if any change in condition.         Renetta Ugarte, APRN - CNP

## 2020-08-21 RX ORDER — ERGOCALCIFEROL 1.25 MG/1
CAPSULE ORAL
COMMUNITY
Start: 2020-07-15 | End: 2020-08-21 | Stop reason: SDUPTHER

## 2020-08-24 LAB — URINE CULTURE, ROUTINE: NORMAL

## 2020-08-25 RX ORDER — ERGOCALCIFEROL 1.25 MG/1
50000 CAPSULE ORAL WEEKLY
Qty: 5 CAPSULE | Refills: 5 | Status: ON HOLD
Start: 2020-08-25 | End: 2022-03-15

## 2020-08-25 RX ORDER — SENNOSIDES 8.6 MG
1300 CAPSULE ORAL DAILY
Qty: 62 TABLET | Refills: 5 | Status: ON HOLD
Start: 2020-08-25 | End: 2022-03-16 | Stop reason: HOSPADM

## 2020-08-26 LAB — URIC ACID: 4.1 MG/DL (ref 3.5–7.2)

## 2020-09-03 ENCOUNTER — TELEPHONE (OUTPATIENT)
Dept: FAMILY MEDICINE CLINIC | Age: 85
End: 2020-09-03

## 2020-09-03 NOTE — TELEPHONE ENCOUNTER
Tommie Paredes called saying she called 1-2 weeks ago asking if we have done PA for pt. MRI?     When we get it let her know so she can schedule it at [facility].

## 2020-09-09 LAB
BUN BLDV-MCNC: 17 MG/DL (ref 7–24)
CALCIUM SERPL-MCNC: 8.6 MG/DL (ref 8.5–10.5)
CHLORIDE BLD-SCNC: 107 MMOL/L (ref 98–107)
CO2: 28 MMOL/L (ref 21–32)
CREAT SERPL-MCNC: 0.64 MG/DL (ref 0.7–1.3)
GFR AFRICAN AMERICAN: > 60 ML/MIN
GFR SERPL CREATININE-BSD FRML MDRD: >60 ML/MIN/
GLUCOSE: 91 MG/DL (ref 65–99)
POTASSIUM SERPL-SCNC: 3.7 MMOL/L (ref 3.5–5.1)
SODIUM BLD-SCNC: 139 MMOL/L (ref 136–145)

## 2020-09-21 RX ORDER — LEVOTHYROXINE SODIUM 0.1 MG/1
100 TABLET ORAL DAILY
Qty: 31 TABLET | Refills: 5 | Status: ON HOLD
Start: 2020-09-21 | End: 2022-03-15

## 2020-09-21 RX ORDER — LEVOTHYROXINE SODIUM 0.1 MG/1
TABLET ORAL
COMMUNITY
Start: 2020-09-15 | End: 2020-09-21 | Stop reason: SDUPTHER

## 2020-09-21 RX ORDER — POTASSIUM CHLORIDE 750 MG/1
CAPSULE, EXTENDED RELEASE ORAL
COMMUNITY
Start: 2020-09-15 | End: 2020-09-21 | Stop reason: SDUPTHER

## 2020-09-21 RX ORDER — POTASSIUM CHLORIDE 750 MG/1
10 CAPSULE, EXTENDED RELEASE ORAL DAILY
Qty: 31 CAPSULE | Refills: 5 | Status: ON HOLD
Start: 2020-09-21 | End: 2022-03-15

## 2020-09-24 ENCOUNTER — TELEPHONE (OUTPATIENT)
Dept: FAMILY MEDICINE CLINIC | Age: 85
End: 2020-09-24

## 2020-09-24 NOTE — TELEPHONE ENCOUNTER
I had [facility] to see if the pt was going to have the MRI done on his shoulder. Per Fabian Mcleod, the pt and his son has decided to not have the procedure done. Fabian Fergusonkary states that the pt's pain has gotten better and the son doesn't want to put the pt through all of that and then quarantine. Please advise if you would like to just cancel the order.

## 2020-09-24 NOTE — PROGRESS NOTES
I have received a fax back from [facility] in regards to the patient's MRI that was scheduled on 9/17/2020. The pt apparently did not go to the appointment. I called [facility] and am waiting on a return call from the pt's nurse.

## 2020-10-01 RX ORDER — MENTHOL 40 MG/ML
GEL TOPICAL
Qty: 89 ML | Refills: 5 | Status: SHIPPED
Start: 2020-10-01 | End: 2022-08-04

## 2020-10-01 RX ORDER — MENTHOL 40 MG/ML
GEL TOPICAL
COMMUNITY
End: 2020-10-01 | Stop reason: SDUPTHER

## 2020-10-05 RX ORDER — CYCLOBENZAPRINE HCL 10 MG
10 TABLET ORAL 2 TIMES DAILY
Qty: 62 TABLET | Refills: 5 | Status: ON HOLD
Start: 2020-10-05 | End: 2022-03-15

## 2020-10-05 RX ORDER — CYCLOBENZAPRINE HCL 10 MG
10 TABLET ORAL 2 TIMES DAILY
COMMUNITY
Start: 2020-08-14 | End: 2020-10-05 | Stop reason: SDUPTHER

## 2020-10-11 ENCOUNTER — HOSPITAL ENCOUNTER (INPATIENT)
Dept: HOSPITAL 83 - ED | Age: 85
LOS: 5 days | Discharge: OTHER INSTITUTION | DRG: 183 | End: 2020-10-16
Attending: STUDENT IN AN ORGANIZED HEALTH CARE EDUCATION/TRAINING PROGRAM | Admitting: STUDENT IN AN ORGANIZED HEALTH CARE EDUCATION/TRAINING PROGRAM
Payer: MEDICARE

## 2020-10-11 VITALS — DIASTOLIC BLOOD PRESSURE: 67 MMHG | SYSTOLIC BLOOD PRESSURE: 116 MMHG

## 2020-10-11 VITALS — HEIGHT: 68.11 IN | BODY MASS INDEX: 19.7 KG/M2 | WEIGHT: 130 LBS

## 2020-10-11 VITALS — DIASTOLIC BLOOD PRESSURE: 75 MMHG | SYSTOLIC BLOOD PRESSURE: 138 MMHG

## 2020-10-11 VITALS — SYSTOLIC BLOOD PRESSURE: 119 MMHG | DIASTOLIC BLOOD PRESSURE: 77 MMHG

## 2020-10-11 VITALS — DIASTOLIC BLOOD PRESSURE: 75 MMHG

## 2020-10-11 VITALS — SYSTOLIC BLOOD PRESSURE: 129 MMHG | DIASTOLIC BLOOD PRESSURE: 79 MMHG

## 2020-10-11 VITALS — DIASTOLIC BLOOD PRESSURE: 74 MMHG
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
## 2020-10-11 DIAGNOSIS — Z51.5: ICD-10-CM

## 2020-10-11 DIAGNOSIS — F03.91: ICD-10-CM

## 2020-10-11 DIAGNOSIS — M54.9: ICD-10-CM

## 2020-10-11 DIAGNOSIS — E87.8: ICD-10-CM

## 2020-10-11 DIAGNOSIS — M10.9: ICD-10-CM

## 2020-10-11 DIAGNOSIS — Z66: ICD-10-CM

## 2020-10-11 DIAGNOSIS — J96.01: ICD-10-CM

## 2020-10-11 DIAGNOSIS — I10: ICD-10-CM

## 2020-10-11 DIAGNOSIS — Z20.828: ICD-10-CM

## 2020-10-11 DIAGNOSIS — E83.41: ICD-10-CM

## 2020-10-11 DIAGNOSIS — K57.90: ICD-10-CM

## 2020-10-11 DIAGNOSIS — R73.9: ICD-10-CM
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
## 2020-10-11 DIAGNOSIS — E44.0: ICD-10-CM

## 2020-10-11 DIAGNOSIS — N30.00: ICD-10-CM

## 2020-10-11 DIAGNOSIS — S32.9XXA: ICD-10-CM

## 2020-10-11 DIAGNOSIS — S22.41XA: Primary | ICD-10-CM

## 2020-10-11 DIAGNOSIS — R65.11: ICD-10-CM

## 2020-10-11 DIAGNOSIS — G89.29: ICD-10-CM

## 2020-10-11 DIAGNOSIS — E03.9: ICD-10-CM
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
## 2020-10-11 LAB
ALBUMIN SERPL-MCNC: 3.2 GM/DL (ref 3.1–4.5)
ALP SERPL-CCNC: 160 U/L (ref 45–117)
ALT SERPL W P-5'-P-CCNC: 15 U/L (ref 12–78)
APTT PPP: 29.5 SECONDS (ref 20–32.1)
AST SERPL-CCNC: 18 IU/L (ref 3–35)
BACTERIA #/AREA URNS HPF: (no result) /[HPF]
BILIRUB UR QL STRIP: (no result)
BUN SERPL-MCNC: 17 MG/DL (ref 7–24)
CHLORIDE SERPL-SCNC: 109 MMOL/L (ref 98–107)
CREAT SERPL-MCNC: 0.71 MG/DL (ref 0.7–1.3)
CRYSTALS URNS MICRO: (no result)
EPI CELLS #/AREA URNS HPF: (no result) /[HPF]
ERYTHROCYTE [DISTWIDTH] IN BLOOD BY AUTOMATED COUNT: 15 % (ref 0–14.5)
HCT VFR BLD AUTO: 44.2 % (ref 42–52)
INR BLD: 1.1 (ref 2–3.5)
LEUKOCYTE ESTERASE UR QL STRIP: (no result)
MCH RBC QN AUTO: 29.7 PG (ref 27–31)
MCHC RBC AUTO-ENTMCNC: 32.4 G/DL (ref 33–37)
MCV RBC AUTO: 91.9 FL (ref 80–94)
MUCOUS THREADS URNS QL MICRO: (no result)
NRBC BLD QL AUTO: 0 10*3/UL (ref 0–0)
PH UR STRIP: 5 [PH] (ref 4.5–8)
PLATELET # BLD AUTO: 356 10*3/UL (ref 130–400)
PLATELET SUFFICIENCY: NORMAL
PMV BLD AUTO: 10.7 FL (ref 9.6–12.3)
POTASSIUM SERPL-SCNC: 3.8 MMOL/L (ref 3.5–5.1)
PROT SERPL-MCNC: 6.5 GM/DL (ref 6.4–8.2)
RBC # BLD AUTO: 4.81 10*6/UL (ref 4.5–5.9)
RBC #/AREA URNS HPF: (no result) RBC/HPF (ref 0–2)
SODIUM SERPL-SCNC: 142 MMOL/L (ref 136–145)
SP GR UR: 1.02 (ref 1–1.03)
TOTAL CELLS COUNTED: 100 #CELLS
TROPONIN I SERPL-MCNC: < 0.015 NG/ML (ref ?–0.04)
UROBILINOGEN UR STRIP-MCNC: 1 E.U./DL (ref 0–1)
WBC NRBC COR # BLD AUTO: 21.6 10*3/UL (ref 4.8–10.8)

## 2020-10-11 NOTE — NUR
PT PULLED OUT HIS IV AND PULLED OFF HIS MONITOR LEADS.HE IS CONFUSED. HX
DEMENTIA. PT REORIENTED. NEW IV INSERTED AND WRAPPED WITH NEW---ERIN MARIE RN

## 2020-10-12 VITALS — SYSTOLIC BLOOD PRESSURE: 132 MMHG | DIASTOLIC BLOOD PRESSURE: 76 MMHG

## 2020-10-12 VITALS — DIASTOLIC BLOOD PRESSURE: 75 MMHG | SYSTOLIC BLOOD PRESSURE: 138 MMHG

## 2020-10-12 VITALS — DIASTOLIC BLOOD PRESSURE: 93 MMHG

## 2020-10-12 VITALS — DIASTOLIC BLOOD PRESSURE: 70 MMHG

## 2020-10-12 VITALS — SYSTOLIC BLOOD PRESSURE: 124 MMHG | DIASTOLIC BLOOD PRESSURE: 86 MMHG

## 2020-10-12 VITALS — DIASTOLIC BLOOD PRESSURE: 85 MMHG

## 2020-10-12 VITALS — DIASTOLIC BLOOD PRESSURE: 70 MMHG | SYSTOLIC BLOOD PRESSURE: 126 MMHG

## 2020-10-12 LAB
ALBUMIN SERPL-MCNC: 2.9 GM/DL (ref 3.1–4.5)
ALP SERPL-CCNC: 143 U/L (ref 45–117)
ALT SERPL W P-5'-P-CCNC: 11 U/L (ref 12–78)
AST SERPL-CCNC: 17 IU/L (ref 3–35)
BUN SERPL-MCNC: 16 MG/DL (ref 7–24)
BURR CELLS BLD QL SMEAR: (no result)
CHLORIDE SERPL-SCNC: 111 MMOL/L (ref 98–107)
CREAT SERPL-MCNC: 0.56 MG/DL (ref 0.7–1.3)
ERYTHROCYTE [DISTWIDTH] IN BLOOD BY AUTOMATED COUNT: 15.4 % (ref 0–14.5)
HCT VFR BLD AUTO: 42 % (ref 42–52)
MCH RBC QN AUTO: 29.2 PG (ref 27–31)
MCHC RBC AUTO-ENTMCNC: 31.7 G/DL (ref 33–37)
MCV RBC AUTO: 92.3 FL (ref 80–94)
NRBC BLD QL AUTO: 0 % (ref 0–0)
PLATELET # BLD AUTO: 361 10*3/UL (ref 130–400)
PLATELET SUFFICIENCY: NORMAL
PMV BLD AUTO: 11.4 FL (ref 9.6–12.3)
POTASSIUM SERPL-SCNC: 3.8 MMOL/L (ref 3.5–5.1)
PROT SERPL-MCNC: 6.2 GM/DL (ref 6.4–8.2)
RBC # BLD AUTO: 4.55 10*6/UL (ref 4.5–5.9)
SODIUM SERPL-SCNC: 142 MMOL/L (ref 136–145)
TOTAL CELLS COUNTED: 100 #CELLS
VITAMIN B12: 745 PG/ML (ref 247–911)
WBC NRBC COR # BLD AUTO: 18.7 10*3/UL (ref 4.8–10.8)

## 2020-10-12 NOTE — NUR
Time: 0848
A 86  year old MALE admitted to 
under services of MARIPOSA ALMEIDA DO.
Pt. arrived via stretcher from
[facility].  Chief complaint: SHORTNESS OF BREATH AND RIB PAIN..
 
ALEXA HUMMEL

## 2020-10-12 NOTE — NUR
PATIENT IN BED RESTING EYES AT THIS TIME. NO DISTRESS NOTED. RESP EASY AND
NONLABORED. RN WILL CONT TO MONITOR

## 2020-10-12 NOTE — NUR
Occupational Therapy evaluation completed on 4 with full eval to follow.
Precautions include fall risk; bed alarm,+2 transfer and ADL assist, dementia,
high complexity level 02843. Recommend OT per POC and SNF to enable return to
[facility]. Thank you for this referral.
Montse Lal OTR/l

## 2020-10-12 NOTE — NUR
PATIENT BECAME VERY AGGITATED AND COMBATIVE WITH TRANSPORT PERSON, REFUSED CT.
CALL PLACED TO [facility] LINE,
SPOKE TO DR. SANCHEZ, HE VERSED THAT PATIENT IS ASSIGNED TO JHOAN DAVIES NP AND
HE WOULD LET HER KNOW.

## 2020-10-12 NOTE — NUR
NOTIFIED OF DNR PAPERWORK FROM [place] STATING PT IS A DNR ARREST.
ORDER IN COMPUTER CURRENTLY STATES PT IS A FULL CODE.  TO CHANGE
ORDER.

## 2020-10-12 NOTE — NUR
PT IS AWAKE AND SLIGHTLY CONFUSED, PT IS COOPERTIVE. PT DENIES PAIN OR SOB.
REPOSITION FOR COMFORT, BREAKFAST TRAY ORDERED. CALL LIGHT IN REACH. WOUND CARE
AT THE BEDSIDE.

## 2020-10-12 NOTE — NUR
PT RESIDES AT [facility] AT THIS TIME.  WILL CONTINUE TO
FOLLOW.  WILL NEED COVID PRIOR TO RETURN.

## 2020-10-12 NOTE — NUR
PATIENT AWAKE AND USING URINAL AT THIS TIME. NO DISTRESS NOTED. RESP EASY AND
NONLABORED. RN WILL CONT TO MONITOR

## 2020-10-12 NOTE — NUR
PATIENT VERY CONFUSED AT THIS TIME, ALERT TO SELF. RESP EASY AND NONLABORED.
GIVEN WATER TO DRINK. NO ACUTE DISTRESS NOTED. RN WILL CONT TO MONITOR

## 2020-10-12 NOTE — NUR
PATIENT IN BED RESTING EYES AT THIS TIME. NO DISTRESS NOTED. RESP EASY AND
NONLABORED, RN WILL CONT TO MONITOR

## 2020-10-12 NOTE — NUR
PATIENT CRIES OUT, AND VERBALLY STRIKES OUT WHEN CARE GIVEN, DOES NOT TOLERATE
TURNING WELL, BECAME ANGRY WHEN ASKED IF HE COULD RATE HIS PAIN, STATED "DAMN
IT, DO I HAVE TO TEACH YOU, I WILL GET MY SPURS ON AND KICK YOU WITH THEM"
NORCO GIVEN FOR PAIN. WHITE BOARD UPDATED.

## 2020-10-12 NOTE — NUR
PHYSICAL THERAPY
 
Physical Therapy evaluation completed on 4th floor with full evaluation to
follow. Recommend physical therapy per plan of care and SNF upon discharge.
Thank you for this referral.
 
Ha Monae SPT
Tania Means PT

## 2020-10-13 VITALS — DIASTOLIC BLOOD PRESSURE: 97 MMHG

## 2020-10-13 VITALS — DIASTOLIC BLOOD PRESSURE: 71 MMHG

## 2020-10-13 VITALS — DIASTOLIC BLOOD PRESSURE: 73 MMHG | SYSTOLIC BLOOD PRESSURE: 116 MMHG

## 2020-10-13 VITALS — DIASTOLIC BLOOD PRESSURE: 77 MMHG | SYSTOLIC BLOOD PRESSURE: 135 MMHG

## 2020-10-13 VITALS — DIASTOLIC BLOOD PRESSURE: 62 MMHG

## 2020-10-13 LAB
ALBUMIN SERPL-MCNC: 3 GM/DL (ref 3.1–4.5)
ALP SERPL-CCNC: 154 U/L (ref 45–117)
ALT SERPL W P-5'-P-CCNC: 13 U/L (ref 12–78)
AST SERPL-CCNC: 18 IU/L (ref 3–35)
BACTERIA #/AREA URNS HPF: (no result) /[HPF]
BASOPHILS # BLD AUTO: 1 % (ref 0–1)
BILIRUB UR QL STRIP: (no result)
BUN SERPL-MCNC: 14 MG/DL (ref 7–24)
BURR CELLS BLD QL SMEAR: (no result)
CHLORIDE SERPL-SCNC: 109 MMOL/L (ref 98–107)
CREAT SERPL-MCNC: 0.68 MG/DL (ref 0.7–1.3)
CRYSTALS URNS MICRO: (no result)
EPI CELLS #/AREA URNS HPF: (no result) /[HPF]
ERYTHROCYTE [DISTWIDTH] IN BLOOD BY AUTOMATED COUNT: 15.3 % (ref 0–14.5)
HCT VFR BLD AUTO: 41.7 % (ref 42–52)
MCH RBC QN AUTO: 29.3 PG (ref 27–31)
MCHC RBC AUTO-ENTMCNC: 31.7 G/DL (ref 33–37)
MCV RBC AUTO: 92.7 FL (ref 80–94)
MUCOUS THREADS URNS QL MICRO: (no result)
NRBC BLD QL AUTO: 0 10*3/UL (ref 0–0)
PH UR STRIP: 5 [PH] (ref 4.5–8)
PLATELET # BLD AUTO: 378 10*3/UL (ref 130–400)
PLATELET SUFFICIENCY: NORMAL
PMV BLD AUTO: 10.9 FL (ref 9.6–12.3)
POTASSIUM SERPL-SCNC: 3.4 MMOL/L (ref 3.5–5.1)
PROT SERPL-MCNC: 6.6 GM/DL (ref 6.4–8.2)
RBC # BLD AUTO: 4.5 10*6/UL (ref 4.5–5.9)
SODIUM SERPL-SCNC: 141 MMOL/L (ref 136–145)
SP GR UR: 1.02 (ref 1–1.03)
TOTAL CELLS COUNTED: 100 #CELLS
UROBILINOGEN UR STRIP-MCNC: 1 E.U./DL (ref 0–1)
WBC #/AREA URNS HPF: (no result) WBC/HPF (ref 0–5)
WBC NRBC COR # BLD AUTO: 19.5 10*3/UL (ref 4.8–10.8)

## 2020-10-13 NOTE — NUR
CALL PLACED TO JHOAN DAVIES NP REGARDING RIGHT HIP PAIN, ROTATION OF RIGHT
HIP. NEW ORDERS RECEIVED.

## 2020-10-13 NOTE — NUR
Received order for visiting nurses, PT/OT for patient when he returns to
[facility]. Patient is ok to return to [facility] when medically stable for
discharge.

## 2020-10-13 NOTE — NUR
PATIENT MEDICATED WITH NORCO AS PRESCRIBED FOR RIGHT RIB AND HIP PAIN RATED
8/10 ON 0/10 SCALE. WILL MONITOR FOR EFFECTIVENESS.

## 2020-10-13 NOTE — NUR
OT NOTE
Pt was seen this A.M. 1:1 for 15 minute OT session. Upon arrival pt was supine
in bed. Pt identified by name and  and had complaints of pain "all over"
which he was unable to rate on 0-10 pain scale. Pt presented to therapy with
continuous 3L-O2 via NC which he remained on throughout the entire session. Pt
transferred supine to sit EOB with maxA X 2. While sitting EOB pt presented
with P+ sitting balance due to R lateral lean that required modA to correct.
While sitting EOB requested for pt to afia B socks and pt required maxA due to
poor command follow and P+ sitting balance.  Pt completed two sit to stand
transfers from bed level with maxA x 2 and use of w/w for UE support.
Challenged pt's static standing tolerance needed for increased I in self care
tasks and functional transfers, pt was able to tolerate aprox 20 seconds at a
time before sitting due to fatigue. Throughout all transfers pt would yell out
due to pain. Pt transferred back into bed sit to supine with maxA X 2. There
he was left with call light in hand, bed rails up, and bed alarm activated for
safety. Continue with rec D/C plan to SNF.
 
SERA Glez/L

## 2020-10-13 NOTE — NUR
PT BECOMING RESTLESS/YELLING OUT WITH MOVEMENT. PT IS STILL VERY PLEASANT, BUT
IS AGITATED AND APPEARS TO BE IN PAIN.
PO NORCO AND PO VISTARIL ADMINISTERED FOR PAIN/AGITATION. WILL MONITOR
EFFECTIVENESS.

## 2020-10-13 NOTE — NUR
Contacted Naun at [facility], he stated patient is from the assisted living
section and they cannot administer IV ABX. If patient doesn't require IV ABX
patient is ok to return whenm medically stable. Patient is already receiving
therapy at [facility].

## 2020-10-13 NOTE — NUR
PHYSICAL THERAPY
 
Patient seen this am 1;1 for therapy visit and was supine in bed upon
therapist arrival. Patient identified by name /  and joined by OT assistant
for observation this morning. Patient presented with continous O2-3L via NC
and was little confused, repeatedly voicing multiple concerns ranging from
home issues to general orientation. Patient transfers supine to sit EOB with
MAX A x 2, with several "yelling" out episodes of increased global pain.
Patient however unable to rate pain on 0-10 pain scale and needed multiple
v/c's for encouragement / focus on task. Patient tolerated several minutes
static EOB sit, CGA at times, then MIN A due to severe R side lean. Patient
also completed several sit to stand transfers at bedside MAX A x 2, use of wh
walker, tolerating < 20 seconds static stand prior to quick onset of fatigue.
Patient returned to supine in bed and remained with bed side rails raised,
call light and bed alarm for safety. Will continue per POC as tolerated, total
treatment time 15 minutes.    Naun Pompa, PTA

## 2020-10-13 NOTE — NUR
PATIENT MEDICATED WITH NORCO FOR PAIN AS PRESCRIBED FOR PAIN RATED 9/10 ON
0/10 SCALE. WILL MONITOR FOR EFFECTIVENESS.

## 2020-10-14 VITALS — DIASTOLIC BLOOD PRESSURE: 79 MMHG

## 2020-10-14 VITALS — SYSTOLIC BLOOD PRESSURE: 103 MMHG | DIASTOLIC BLOOD PRESSURE: 69 MMHG

## 2020-10-14 VITALS — DIASTOLIC BLOOD PRESSURE: 65 MMHG

## 2020-10-14 VITALS — DIASTOLIC BLOOD PRESSURE: 81 MMHG

## 2020-10-14 LAB
BURR CELLS BLD QL SMEAR: (no result)
ERYTHROCYTE [DISTWIDTH] IN BLOOD BY AUTOMATED COUNT: 15.3 % (ref 0–14.5)
HCT VFR BLD AUTO: 39.1 % (ref 42–52)
MCH RBC QN AUTO: 29.2 PG (ref 27–31)
MCHC RBC AUTO-ENTMCNC: 31.7 G/DL (ref 33–37)
MCV RBC AUTO: 92 FL (ref 80–94)
NRBC BLD QL AUTO: 0 10*3/UL (ref 0–0)
PLATELET # BLD AUTO: 383 10*3/UL (ref 130–400)
PLATELET SUFFICIENCY: NORMAL
PMV BLD AUTO: 12 FL (ref 9.6–12.3)
RBC # BLD AUTO: 4.25 10*6/UL (ref 4.5–5.9)
TOTAL CELLS COUNTED: 100 #CELLS
WBC NRBC COR # BLD AUTO: 14.8 10*3/UL (ref 4.8–10.8)

## 2020-10-14 NOTE — NUR
PHYSICAL THERAPY
 
TREATMENT TIME:   OUT 09:05 AM  20 MINUTES TOTAL
 
PRESENTATION:
patient presented to therapy in supine with head of bed elevated and bed alarm
on.
informd consent consent for treatment
identified by name and  on wristband
 
COMPLAINTS:
complaint of pain in the r ribs
complaint of pain all over
 
WB STATUS:
No wt bearing restrictions
 
ASSISTIVE DEVICE:
straight cane
patient did not use straight cane today in therapy
unable to stand due to pain
 
TRANSFERS:
supine <> sit EOB : max a x 2
log roll to L side: MAX A X 1
sit on eob: min a x 1 to counter L lateral lean
patient cannot stand due to pain level
 
TREATMENT:
patient sat on eob for 5 minutes total
min a x 1 to counter L lateral lean
 
RESPONSE TO TREATMENT:
patient was only able to tolerate sitting on EOB for 5 minutes with min a x 1
patient has significant pain with any movement
patient unable to tolerate standing sec to pain
 
CONCLUSION:
patient was left in supine with head of bed elevated
call light within reach
bed alarm on
tray table with breakfast on it in front of patient
 
                                             TIFFANY BATRES PTA

## 2020-10-14 NOTE — NUR
EARLIER MEDS APPEAR EFFECTIVE. PT ASLEEP IN BED. NO S/S OF DISTRESS NOTED.
WILL MONITOR. CALL LIGHT IN REACH.

## 2020-10-14 NOTE — NUR
OT NOTE
 
 
 
PATIENT SEEN 1:1 OT THIS DATE 16 MINUTES.  PATIENT IDENTIFIED BY NAME AND DATE
OF BIRTH.  PATIENT COMPLETED SUPINE TO SIT EOB MAX A X 2 GRIMACING DUE TO
COMPLAIN PAIN IN RIGHT SIDE WITH MOVEMENT. PATIENT DECLINED TO ATTEMPT TO
STAND STATING THAT HE WAS IN "TOO MUCH PAIN." PATIENT COMPLETED SIT TOLERANCE
EOB MOD A TO MAINTAIN BALANCE COMPLETING UB DRESSING GOWN MAX A AND WASHING
FACE CGA.  PATIENT DISORIENTED TO PLACE AND TIME. PATIENT COMPLETED SIT TO
SUPINE MAX A X 2. CALL LIGHT WITHIN REACH AND BED ALARM INTACT. PATIENT
SITTING UPRIGHT IN BED WITH BREAKFAST TRAY PLACEMENT. CONTINUE TOWARDS PLAN OF
CARE.
 
RAYNA ZAMORA/L

## 2020-10-14 NOTE — NUR
In discharge planning meeting today, OTR discovered that patient has an acute
pelvic fx as follows:
  1. Acute fractures in the bilateral superior and inferior pubic rami
  with the superior pubic rami fractures extending to the bilateral
  pubic bodies.
  2. Acute fracture in zone 1 of the right sacral ala.
OT to proceed with treatment plan per POC and treat within pain tolerance and
utilize adaptive equipment as indicated. ZAMORA informed of the above
information.
Thank you.
Alise Lal OTR/terry

## 2020-10-14 NOTE — NUR
EARLIER MEDICATIONS APPEAR EFFECTIVE. PT ASLEEP IN BED. NO S/S OF DISTRESS
NOTED. WILL MONITOR. CALL LIGHT IN REACH. BED ALARM INTACT.

## 2020-10-14 NOTE — NUR
ATTEMPTED TO CALL PT BRITNEY CHAUDHARY AT [facility] TO DISCUSS DISCHARGE PLANS.
NO ANSWER, NO ANSWERING MACHINE TO LEAVE MESSAGE.  WILL TRY AGAIN LATER.

## 2020-10-14 NOTE — NUR
PHYSICAL THERAPY
 
Per medical meeting this AM CT scan pelvis done secondary to pt's complaints
of pain demonstrating sup/inf pubic rami fx with sup fx extending into
bilateral pubic bodies. Ortho has been consulted will await further assessment
and recomendations prior to continuing therapy.
 
Tania Means PT

## 2020-10-14 NOTE — NUR
PT ASLEEP IN BED. RESPIRATIONS EASY. NO S/S OF DISTRESS NOTED. WILL MONITOR.
CALL LIGHT IN REACH. BED ALARM INTACT

## 2020-10-14 NOTE — NUR
PHYSICAL THERAPY
 
Pt seen by Ortho for new pelvic fracture, per notes in H&P can be activity as
tolerated, will cont with POC.
 
 
Tania Means PT

## 2020-10-14 NOTE — NUR
PER JHOAN DAVIES SHE SPOKE WITH PT SON AND HE WOULD LIKE TO SEE HOW PT
PROGRESSES BEFORE MAKING DECISION ON DISCHARGE PLANS.  WILL CONTINUE TO
FOLLOW. patient /105

## 2020-10-14 NOTE — NUR
PT AGAIN RESTLESS/FIDGETING IN BED. APPEARS TO BE IN PAIN. WHEN ASKED IF IN
PAIN, PT LAUGHS AND SAYS "FOR ALMOST 100 YEARS." RN ASKED IF PT WANTED PAIN
MEDICATION, PT REPLIES "SURE." PO NORCO GIVEN FOR PAIN. PO VISTARIL GIVEN FOR
INCREASED AGITATION/RESTLESSNESS. PILLS CRUSHED IN APPLESAUCE. PT TOLERATED
WELL. WILL MONITOR EFFECTIVENESS.
 
PT PULLED UP AND REPOSITIONED IN BED FOR COMFORT. CALL LIGHT IN REACH. BED
ALARM INTACT.

## 2020-10-15 VITALS — DIASTOLIC BLOOD PRESSURE: 58 MMHG

## 2020-10-15 VITALS — DIASTOLIC BLOOD PRESSURE: 65 MMHG

## 2020-10-15 VITALS — DIASTOLIC BLOOD PRESSURE: 70 MMHG

## 2020-10-15 VITALS — SYSTOLIC BLOOD PRESSURE: 111 MMHG | DIASTOLIC BLOOD PRESSURE: 69 MMHG

## 2020-10-15 VITALS — DIASTOLIC BLOOD PRESSURE: 53 MMHG

## 2020-10-15 LAB
BASOPHILS # BLD AUTO: 1 % (ref 0–1)
BUN SERPL-MCNC: 14 MG/DL (ref 7–24)
CHLORIDE SERPL-SCNC: 109 MMOL/L (ref 98–107)
CREAT SERPL-MCNC: 0.49 MG/DL (ref 0.7–1.3)
ERYTHROCYTE [DISTWIDTH] IN BLOOD BY AUTOMATED COUNT: 15.6 % (ref 0–14.5)
HCT VFR BLD AUTO: 37.9 % (ref 42–52)
MCH RBC QN AUTO: 29.8 PG (ref 27–31)
MCHC RBC AUTO-ENTMCNC: 32.2 G/DL (ref 33–37)
MCV RBC AUTO: 92.7 FL (ref 80–94)
NRBC BLD QL AUTO: 0 % (ref 0–0)
PLATELET # BLD AUTO: 366 10*3/UL (ref 130–400)
PLATELET SUFFICIENCY: NORMAL
PMV BLD AUTO: 11.4 FL (ref 9.6–12.3)
POTASSIUM SERPL-SCNC: 3.6 MMOL/L (ref 3.5–5.1)
RBC # BLD AUTO: 4.09 10*6/UL (ref 4.5–5.9)
RBC MORPH BLD: NORMAL
SODIUM SERPL-SCNC: 140 MMOL/L (ref 136–145)
TOTAL CELLS COUNTED: 100 #CELLS
WBC NRBC COR # BLD AUTO: 12.7 10*3/UL (ref 4.8–10.8)

## 2020-10-15 NOTE — NUR
PT MEDICATED WITH  NORCO FOR C/O GEN PAIN .  PT UNABLE TO RATE PAIN JUST SAYS
" IT HURTS !"
AND MEDICATED WITH VISTAR[facility] FOR RESTLESSNESS.   WILL MONITOR

## 2020-10-15 NOTE — NUR
OT NOTE
Pt was seen this A.M. 1:1 for 15 minute OT session. Upon arrival pt was supine
in bed. Pt identified by name and  and had complaints of pain in B hips
which he was unable to rate on 0-10 pain scale. Pt transferred supine to sit
EOB with maxA X 2. Challenged pt's dynamic sitting balance while weight
shifting, crossing midline, and reaching over all planes pt was able to
maintain P+/F- sitting balance due to R lateral lean and retrograde posture.
Pt quickly became aggitated and was yelling out "get out of my room, leave
now." Pt transferred back into bed sit to supine with maxA X 2. There he was
left with call light in hand, tray table in place, and bed alarm activated for
safety. Continue with rec D/C plan to SNF.
 
SERA Glez/L

## 2020-10-15 NOTE — NUR
PHYSICAL THERAPY
 
Patient seen this am 1;1 for therapy visit and was supine in bed upon
therapist arrival. Patient identified by name /  and was joined by OT
assistant for observation only this morning as patient was inititally
pleasant, voicing no new c/o's. Patient however presented with a little
confusion as he was not completely oriented to time / date / place. Patient
transfered supine to sit EOB with MAX A x 2, needed MAX v/c for focus on task.
Patient only able to tolerate approx 1 minute static EOB sit before becoming
extremely Agitated, yelling out at Therapist, "get out of my room"  and "leave
now". Patient attempted to lay back down and was able to lift both legs up
onto bed, however needed MAX A x 2 for bed mobilty / positioning. Patient
remained in bed with call light, bed side rails raised and bed alarm for
safety. Will continue per POC as able.   Naun Pompa, PTA

## 2020-10-15 NOTE — NUR
Patients son,  Jamison is agreeable to snf placement for 2 weeks at the
[facility]. Contacted [facility] and faxed full referral. Starting precert ASAP, covid
is negative, possible d/c today if able to obtain auth

## 2020-10-15 NOTE — NUR
SPOKE WITH PT SON NEENA AND HE IS REQUESTING REFERRAL [facility].
REFERRAL HAS BEEN SENT BY DISCHARGE PLANNER.  WILL CONTINUE TO FOLLOW.

## 2020-10-15 NOTE — NUR
Updated clinicals faxed to [facility] for review. Patient is possible snf
placement, however patients son would rather he return to [facility] with
therapy.

## 2020-10-15 NOTE — NUR
Contacted Marielle at [facility]. She stated she has reviewed the clinicals and
therapy and stating that patient should not return there until he completes 2
weeks of snf. Patient is in too much pain and will not be able to lay in bed
for total care in assisted living. Patient will be referred to snf once son
gives a location for referral.

## 2020-10-16 VITALS — DIASTOLIC BLOOD PRESSURE: 64 MMHG | SYSTOLIC BLOOD PRESSURE: 110 MMHG

## 2020-10-16 VITALS — SYSTOLIC BLOOD PRESSURE: 113 MMHG | DIASTOLIC BLOOD PRESSURE: 79 MMHG

## 2020-10-16 NOTE — NUR
PATIENT CALLING OUT. PATIENT REORIENTED AT THIS TIME. PATIENT WAS CALMED DOWN
AND REPOSITIONED UP IN BED, GURINDER-CARE PROVIDED FOR INCONT EPISODE. BED IS
LOCKED IN LOWEST POSITION, ALARM MAINTAINED. CALL LIGHT REINFORCMENT

## 2020-10-16 NOTE — NUR
OCCUPATIONAL THERAPY CO-SIGN
 
I approve of the Occupational Therapy notes written above.
ALDAIR DUNAWAY OTR/ALMAZ

## 2020-10-16 NOTE — NUR
Discharge instructions reviewed with patient/family. Patient receptive and
verbalizes understanding. Follow-up care arranged. Written instructions given
to patient/family.
FRANCINE FOWLER

## 2020-10-16 NOTE — NUR
OT NOTE
Pt was seen this A.M. 1:1 for 15 minute OT session. Upon arrival pt was supine
in bed. Pt identified by name and  and had complaints of B hip pain which
he was unable to rate on 0-10 pain scale. Pt transferred supine to sit EOB
with maxA X 2. Upon inital rise to the EOB pt presented with retrograde
posture and R lateral lean that required modA to correct. Sit to stand
completed from bed level with maxA X 2 and use of w/w for UE support. Pt then
transferred sit to supine with maxA X 2. There he was left with call light in
hand, tray table in place, and bed alarm activated for safety. Continue with
rec D/C plan to SNF.
 
SERA Glez/L

## 2020-10-16 NOTE — NUR
Nutritional Support Services Note: Appetite is poor for meals, he needs
encouraged to eat and assist with meals. Regular diet as ordered with Ensure
po TID with meals.  Staff to encourage intake. Peterson pt food preferences.
Wound noted to elbow. No other nutrition intervention needed at this time.
Gilma Gavin Rdn Ld

## 2020-10-16 NOTE — NUR
PHYSICAL THERAPY
PT IS LAYING SUPINE IN BED UPN ARRIVAL. AGREEABLE TO TX. PT IDENTIFIED BY
WRIST BRACLET D/T CONFUSION. PT C/O LOW BACK PAIN AND B HIP PAIN.
PT BED MOBILITY FROM SUPINE TO SIT EOB REQUIRING MAX A X2 W/ MAX V/C ON PROPER
HAND PLACEMENT AND INCREASING INITIATION W/ MINIMAL FOLLOW THROUGH.
SIT EOB X5 MIN W/ EXCESSIVE R SIDED LEANING W/ V/C ON TECHN. TO CORRECT W/ NO
FOLLOW THROUGH. STS FROM EOB TO WHEELED WALKER MAX A X2 W/ PT INITIALLY
YELLING D/T PAIN. SST < 1 MIN W/ MAX A X2 TO MAINTAIN D/T R SIDED AND RETRO
LEANING. BED MOBILITY FROM SITTING EOB TO SUPINE REQUIRES MAX A X2 W/
DECREASED C/O PAIN. BED ALARM ENABLED AND CALL LIGHT W/IN REACH.
TOTAL TX TIME= 15 MIN
ANITA SORTO, PTA

## 2020-10-16 NOTE — NUR
LSW SPOKE WITH PRICILLA ESCAMILLA. LSW ARRANGED FOR A 1PM TRANSPORT WITH [facility]. LSW NOTIFIED ESTRELLA WILLARD, AND SPOKE WITH PATIENTS SON DR. ONTIVEROS.
LSW WILL FAX DISCHARGE ORDERS WHEN ABLE.

## 2020-10-16 NOTE — NUR
ATTEMPTED TO CALL REPORT TO [facility]. NURSE STATES THAT SHE WILL HAVE
ANOTHER NURSE CALL ME BACK.

## 2020-11-05 ENCOUNTER — HOSPITAL ENCOUNTER (OUTPATIENT)
Dept: HOSPITAL 83 - ORTHO | Age: 85
Discharge: HOME | End: 2020-11-05
Attending: ORTHOPAEDIC SURGERY
Payer: MEDICARE

## 2020-11-05 DIAGNOSIS — M85.88: Primary | ICD-10-CM

## 2021-02-15 ENCOUNTER — OUTSIDE SERVICES (OUTPATIENT)
Dept: FAMILY MEDICINE CLINIC | Age: 86
End: 2021-02-15
Payer: MEDICARE

## 2021-02-15 DIAGNOSIS — E03.9 HYPOTHYROIDISM, UNSPECIFIED TYPE: ICD-10-CM

## 2021-02-15 DIAGNOSIS — Z91.81 HISTORY OF FALLING: ICD-10-CM

## 2021-02-15 DIAGNOSIS — E55.9 VITAMIN D DEFICIENCY: ICD-10-CM

## 2021-02-15 DIAGNOSIS — K59.00 CONSTIPATION, UNSPECIFIED CONSTIPATION TYPE: ICD-10-CM

## 2021-02-15 DIAGNOSIS — I10 HYPERTENSION, UNSPECIFIED TYPE: Primary | ICD-10-CM

## 2021-02-15 PROCEDURE — 99308 SBSQ NF CARE LOW MDM 20: CPT | Performed by: NURSE PRACTITIONER

## 2021-02-15 NOTE — PROGRESS NOTES
2/15/2021    Hailey Morgan  1934    This resident is being seen today for a follow-up evaluation visit. He is a resident who has long-term medical conditions including hypertension, hypothyroidism, dementia with some degree of cognitive decline, frequent falls vitamin D deficiency, gout, respiratory failure with hypoxia in times past, rib fractures to the right side, anxiety disorder, major depressive disorder, moderate protein calorie malnutrition, diverticulitis, low back pain,. He is a 80 y.o. male resident who is being seen today for a follow-up evaluation and has been doing relatively well with recent med changes with respect to upward titration of his memantine and initiation of Remeron. He does appear to be tolerating these changes in a satisfactory manner. He currently denies any complaints of pain, any headaches, any sore throat, coughing, or shortness of breath. No nausea, vomiting, constipation or diarrhea. No fever, or chills. No recent falls or syncopal events. Medications: Allopurinol 100 mg daily, bimatoprost solution 0.01% 1 drop both eyes at bedtime, zinc 50 mg daily, Cymbalta 60 mg daily, dorzolamide HCL solution 2% 1 drop both eyes daily, colchicine 0.6 mg every 8 hours as needed, Biofreeze gel 4% topically twice daily, acetaminophen 1000 mg daily, levothyroxine 100 mcg daily, vitamin C 1000 mg daily, Remeron 7.5 mg at bedtime, memantine HCL ER 24-hour 14 mg at bedtime    Objective     Vital Signs: /78 pulse 71 respirations 18 temperature 97 O2 97% weight 122 pounds      Physical examination:Skin is essentially warm and dry. HEENT unremarkable. Neck is supple. Heart regular rate and rhythm. No rubs, gallops or murmurs noted. Lungs are clear to auscultation. No evidence of rhonchi, rales, or wheezing. Abdomen is soft, supple and non-tender. Bowel sounds are noted x4 quadrants. No rigidity, guarding or rebound tenderness.   Negative Nelson's, negative McBurney's, negative

## 2021-03-18 ENCOUNTER — OUTSIDE SERVICES (OUTPATIENT)
Dept: FAMILY MEDICINE CLINIC | Age: 86
End: 2021-03-18
Payer: MEDICARE

## 2021-03-18 DIAGNOSIS — R30.0 DYSURIA: ICD-10-CM

## 2021-03-18 DIAGNOSIS — R10.9 ABDOMINAL PAIN, UNSPECIFIED ABDOMINAL LOCATION: Primary | ICD-10-CM

## 2021-03-18 DIAGNOSIS — E03.9 HYPOTHYROIDISM, UNSPECIFIED TYPE: ICD-10-CM

## 2021-03-18 DIAGNOSIS — E55.9 VITAMIN D DEFICIENCY: ICD-10-CM

## 2021-03-18 DIAGNOSIS — I10 HYPERTENSION, UNSPECIFIED TYPE: ICD-10-CM

## 2021-03-18 PROCEDURE — 99309 SBSQ NF CARE MODERATE MDM 30: CPT | Performed by: NURSE PRACTITIONER

## 2021-03-18 NOTE — PROGRESS NOTES
3/18/2021    Wu Tripathi  1934    This resident is being seen today for a follow-up evaluation visit. He is a resident who has long-term medical conditions including hypertension, hypothyroidism, dementia with some degree of cognitive decline, frequent falls vitamin D deficiency, gout, respiratory failure with hypoxia in times past, rib fractures to the right side, anxiety disorder, major depressive disorder, moderate protein calorie malnutrition, diverticulitis, low back pain,. He is a 80 y.o. male resident who is being seen today for a follow-up evaluation who is alert and responsive to verbal and tactile stimuli with some degree of baseline confusion and does remain bed to chair confined with recommendations to transfer by way of assist x1. Staff indicates that he has had no changes in behavior in terms of aggressive, combative, or disruptive behaviors. Resident does complain of some degree of what he describes as an abdominal pain from time to time. He indicates that it occurs around his lower abdominal region where his belt lies. He does indicate he has dysuria with frequency from time to time. He furthermore offers no acute complaints in terms of headaches or dizziness, sore throat, chest pain, nausea or vomiting, constipation or diarrhea, fever or chills, recent falls or syncopal events. Medications:  Allopurinol 100 mg daily  bimatoprost solution 0.01% 1 drop both eyes at bedtime  zinc 50 mg daily  Cymbalta 60 mg daily  dorzolamide HCL solution 2% 1 drop both eyes daily  colchicine 0.6 mg every 8 hours as needed  Biofreeze gel 4% topically twice daily acetaminophen 1000 mg daily levothyroxine 100 mcg daily  vitamin C 1000 mg daily  Remeron 7.5 mg at bedtime  memantine HCL ER 24-hour 21 mg at bedtime    Objective     Vital Signs: /60 pulse 70 respirations 18 temperature 97.7 O2 97% weight 127 pounds      Physical examination:Skin is essentially warm and dry. HEENT unremarkable. Neck is supple. Heart regular rate and rhythm. No rubs, gallops or murmurs noted. Lungs are clear to auscultation. No evidence of rhonchi, rales, or wheezing. Abdomen is soft and supple and non-tender. Bowel sounds are noted x4 quadrants. No rigidity, guarding or rebound tenderness. Negative Nelson's, negative McBurney's, negative Rajendra's. Extremities; no true pitting edema. Pulses are adequate. No clubbing  or no cyanosis noted. He does have some modest motion restriction with respect to the shoulders with abduction and extension. There is some degree of venous insufficiency to the lower extremities, without appreciable change. Neurologically he  is alert and oriented x3. No evidence of paralysis or paresthesias noted. Diagnoses and all orders for this visit:    Abdominal pain, unspecified abdominal location  Comments:  Scribes this as being like a belt. Will obtain UA CNS. Dysuria  Comments: Will obtain urinalysis with culture and sensitivity. Hypertension, unspecified type  Comments:  Controlled without medical intervention. Vitamin D deficiency  Comments:  Controlled with recommendations to monitor vitamin D level accordingly. Hypothyroidism, unspecified type  Comments:  Controlled with levothyroxine. Monitor TSH accordingly. Plan: Plan of care was discussed with the healthcare team with meds and labs reviewed. Resident had no abdominal tenderness on my examination, but given his complaints of pain from time to time with dysuria and frequency, I am going to obtain a urinalysis with culture and sensitivity. We will furthermore continue forth with his current medical regimen with the benefit of the upward titration of the memantine which she does appear to be tolerating satisfactorily. I will continue with his regular diet with house supplement and furthermore track his intakes, monitor his weights and behaviors, and see him routinely and as needed with further orders forthcoming.

## 2021-04-19 ENCOUNTER — OUTSIDE SERVICES (OUTPATIENT)
Dept: FAMILY MEDICINE CLINIC | Age: 86
End: 2021-04-19
Payer: MEDICARE

## 2021-04-19 DIAGNOSIS — I10 HYPERTENSION, UNSPECIFIED TYPE: ICD-10-CM

## 2021-04-19 DIAGNOSIS — E03.9 HYPOTHYROIDISM, UNSPECIFIED TYPE: ICD-10-CM

## 2021-04-19 DIAGNOSIS — R35.0 URINARY FREQUENCY: Primary | ICD-10-CM

## 2021-04-19 DIAGNOSIS — M54.50 SEVERE LUMBAR PAIN: ICD-10-CM

## 2021-04-19 DIAGNOSIS — F03.90 DEMENTIA WITHOUT BEHAVIORAL DISTURBANCE, UNSPECIFIED DEMENTIA TYPE: ICD-10-CM

## 2021-04-19 PROCEDURE — 99309 SBSQ NF CARE MODERATE MDM 30: CPT | Performed by: NURSE PRACTITIONER

## 2021-04-20 NOTE — PROGRESS NOTES
behaviors, and see him routinely and as needed with further orders forthcoming. VIRGINIA BANDA, APRN - CNP      *Note was creating using voice recognition software.   The document was reviewed however grammatical errors may exist.

## 2021-05-17 ENCOUNTER — OUTSIDE SERVICES (OUTPATIENT)
Dept: FAMILY MEDICINE CLINIC | Age: 86
End: 2021-05-17
Payer: MEDICARE

## 2021-05-17 DIAGNOSIS — I10 HYPERTENSION, UNSPECIFIED TYPE: ICD-10-CM

## 2021-05-17 DIAGNOSIS — E55.9 VITAMIN D DEFICIENCY: ICD-10-CM

## 2021-05-17 DIAGNOSIS — E03.9 HYPOTHYROIDISM, UNSPECIFIED TYPE: Primary | ICD-10-CM

## 2021-05-17 DIAGNOSIS — K59.00 CONSTIPATION, UNSPECIFIED CONSTIPATION TYPE: ICD-10-CM

## 2021-05-17 DIAGNOSIS — M54.50 SEVERE LUMBAR PAIN: ICD-10-CM

## 2021-05-17 PROCEDURE — 99309 SBSQ NF CARE MODERATE MDM 30: CPT | Performed by: NURSE PRACTITIONER

## 2021-05-17 NOTE — PROGRESS NOTES
Abdomen is soft and supple and non-tender. Bowel sounds are noted x4 quadrants. No rigidity, guarding or rebound tenderness. Negative Nelson's, negative McBurney's, negative Rajendra's. Extremities; no true pitting edema. Pulses are adequate. No clubbing  or no cyanosis noted. He does have some modest motion restriction with respect to the shoulders with abduction and extension. There is some degree of venous insufficiency to the lower extremities, without appreciable change. Neurologically he  is alert and oriented x3. No evidence of paralysis or paresthesias noted. Diagnoses and all orders for this visit:    Hypothyroidism, unspecified type  Comments:  TSH of 16.024 with recent upper titration of levothyroxine recommendations to get a repeat TSH in 6 weeks    Constipation, unspecified constipation type  Comments:  Controlled    Hypertension, unspecified type  Comments:  Stable    Severe lumbar pain  Comments:  Controlled    Vitamin D deficiency  Comments:  Stable    Plan: Plan of care was discussed with the healthcare team with meds and labs reviewed. Most recent Synthroid level of 16.024, with which he did have upper titration of his levothyroxine and recommendations have been given for repeat TSH in the course of 6 weeks. Resident otherwise relatively stable does benefit from a pressure reducing mattress to his bed with a pressure reducing cushion to his wheelchair and recommendations have been given to float his heels while in bed. We do maintain his bed in a low position with floor mats to the floor as well. He does follow with Dr. Terry Villa accordingly. I will continue forth with his current plan of care with the benefit of a regular diet with frozen nutritional supplement and house supplement. I will track his intakes, monitor his weights and behaviors, and see him routinely and as needed with further orders forthcoming.          VIRGINIA BANDA, APRN - CNP      *Note was creating using voice recognition software.   The document was reviewed however grammatical errors may exist.

## 2021-05-24 ENCOUNTER — OUTSIDE SERVICES (OUTPATIENT)
Dept: FAMILY MEDICINE CLINIC | Age: 86
End: 2021-05-24

## 2021-05-24 DIAGNOSIS — F03.90 DEMENTIA WITHOUT BEHAVIORAL DISTURBANCE, UNSPECIFIED DEMENTIA TYPE: ICD-10-CM

## 2021-05-24 DIAGNOSIS — L91.8 SKIN TAG: Primary | ICD-10-CM

## 2021-05-24 DIAGNOSIS — K59.00 CONSTIPATION, UNSPECIFIED CONSTIPATION TYPE: ICD-10-CM

## 2021-05-24 DIAGNOSIS — E03.9 HYPOTHYROIDISM, UNSPECIFIED TYPE: ICD-10-CM

## 2021-05-24 DIAGNOSIS — I10 HYPERTENSION, UNSPECIFIED TYPE: ICD-10-CM

## 2021-05-24 NOTE — PROGRESS NOTES
5/24/2021    Ebenezer Leiva  1934    This resident is being seen today for a skilled evaluation visit. He is a resident who has long-term medical conditions including hypertension, hypothyroidism, dementia with some degree of cognitive decline, frequent falls vitamin D deficiency, gout, respiratory failure with hypoxia in times past, rib fractures to the right side, anxiety disorder, major depressive disorder, moderate protein calorie malnutrition, diverticulitis, low back pain,. He is a 80 y.o. male resident who is being seen today for a skilled evaluation with this resident currently under the part B program and has recommendations for physical therapy 3-5 times per week for the course of 30 days, along with occupational therapy services. This is a resident who is bed to chair confined and has recommendations to transfer by way of assist x1. He is alert responsive to verbal and tactile stimuli but is relatively vague but offers no acute issues at this time. Staff indicates that he has had no changes in behavior in terms of aggressive, combative, or disruptive behaviors. Resident furthermore denies any pain,  headaches or dizziness, sore throat, chest pain, coughing or shortness of breath, nausea or vomiting, constipation or diarrhea, dysuria or frequency, fever or chills, falls or syncopal events. Medications:  Allopurinol 100 mg daily  bimatoprost solution 0.01% 1 drop both eyes at bedtime  zinc 50 mg daily  Cymbalta 60 mg daily  dorzolamide HCL solution 2% 1 drop both eyes daily  colchicine 0.6 mg every 8 hours as needed  Biofreeze gel 4% topically twice daily acetaminophen 1000 mg daily levothyroxine 125 mcg daily  vitamin C 1000 mg daily  Remeron 7.5 mg at bedtime  memantine HCL ER 24-hour 28 mg at bedtime    Objective     Vital Signs: /64 pulse 78 respirations 16 temperature 97.5 O2 96% weight 125 pounds      Physical examination:Skin is essentially warm and dry.   There is a notable did refuse. I will furthermore continue with his plan of care with his current medical regimen as otherwise indicated, maintaining his physical therapy as well as occupational therapy. I will maintain the benefit of a regular diet with a frozen nutritional supplement and house supplement and I will track his monitor his weights and behaviors, and see him routinely and as needed with further orders forthcoming. VIRGINIA BANDA, APRN - CNP      *Note was creating using voice recognition software.   The document was reviewed however grammatical errors may exist.

## 2021-06-21 ENCOUNTER — OUTSIDE SERVICES (OUTPATIENT)
Dept: FAMILY MEDICINE CLINIC | Age: 86
End: 2021-06-21

## 2021-06-21 DIAGNOSIS — E03.9 HYPOTHYROIDISM, UNSPECIFIED TYPE: ICD-10-CM

## 2021-06-21 DIAGNOSIS — F03.90 DEMENTIA WITHOUT BEHAVIORAL DISTURBANCE, UNSPECIFIED DEMENTIA TYPE: ICD-10-CM

## 2021-06-21 DIAGNOSIS — R35.0 URINARY FREQUENCY: Primary | ICD-10-CM

## 2021-06-21 DIAGNOSIS — K59.00 CONSTIPATION, UNSPECIFIED CONSTIPATION TYPE: ICD-10-CM

## 2021-06-21 DIAGNOSIS — I10 HYPERTENSION, UNSPECIFIED TYPE: ICD-10-CM

## 2021-06-21 NOTE — PROGRESS NOTES
6/21/2021    Marylene Beery  1934    This resident is being seen today for a follow-up evaluation visit. He is a resident who has long-term medical conditions including hypertension, hypothyroidism, dementia with some degree of cognitive decline, frequent falls vitamin D deficiency, gout, respiratory failure with hypoxia in times past, rib fractures to the right side, anxiety disorder, major depressive disorder, moderate protein calorie malnutrition, diverticulitis, low back pain,. He is a 80 y.o. male resident who is being seen today for a follow-up evaluation. He is alert and oriented, but somewhat vague. Jose Alberto Hopper He is bed to chair confined with recommendations to transfer by way of assist x1. Staff indicates that he has had no changes in behavior in terms of aggressive, combative, or disruptive behaviors. Resident furthermore denies any pain, headaches or dizziness, sore throat, chest pain, coughing or shortness of breath, nausea or vomiting, constipation or diarrhea, dysuria or frequency, fever or chills, falls or syncopal events. Medications:  Allopurinol 100 mg daily  bimatoprost solution 0.01% 1 drop both eyes at bedtime  Cymbalta 60 mg daily  dorzolamide HCL solution 2% 1 drop both eyes daily  colchicine 0.6 mg every 8 hours as needed  Biofreeze gel 4% topically twice daily acetaminophen 1000 mg daily levothyroxine 125 mcg daily  Remeron 7.5 mg at bedtime  memantine HCL ER 24-hour 28 mg at bedtime    Objective     Vital Signs: /66 pulse 77 respirations 18 temperature 97.8 O2 98% weight 137 pounds      Physical examination:Skin is essentially warm and dry. There is a notable skin tag to the right collarbone area, chronic in nature. HEENT unremarkable. Neck is supple. Heart regular rate and rhythm. No rubs, gallops or murmurs noted. Lungs are clear to auscultation. No evidence of rhonchi, rales, or wheezing. Abdomen is soft and supple and non-tender. Bowel sounds are noted x4 quadrants. No rigidity, guarding or rebound tenderness. Negative Nelson's, negative McBurney's, negative Rajendra's. Extremities; no true pitting edema. Pulses are adequate. No clubbing  or no cyanosis noted. He does have some modest motion restriction with respect to the shoulders with abduction and extension. There is some degree of venous insufficiency to the lower extremities, without appreciable change. Neurologically he  is alert and oriented x3. No evidence of paralysis or paresthesias noted. Diagnoses and all orders for this visit:    Urinary frequency  Comments:  Recent upward titration of Flomax with further recommendations to obtain PSA    Constipation, unspecified constipation type  Comments:  Stable    Hypertension, unspecified type  Comments:  Controlled    Hypothyroidism, unspecified type  Comments:  Recent elevated TSH of 16.024 with upper titration of levothyroxine with repeat TSH recommended    Dementia without behavioral disturbance, unspecified dementia type ([facility] [address]  Comments:  Maintain memantine         Plan: Plan of care was discussed with the healthcare team with meds and labs reviewed. TSH 16.024 with recent changes with Synthroid with repeat TSH recommended. He did have recent upward titration of his Flomax and indicates that he has no new symptomatology, outside of his urinary frequency. New recommendations at this time will include a CBC, CMP, vitamin D and a PSA. I will furthermore continue with his current medical regimen as clinically indicated and maintain the benefit of his regular diet with frozen nutritional supplement and house supplement. I will track his intakes, monitor his weights and behaviors, and see him routinely and as needed with further orders forthcoming. VIRGINIA BANDA, APRN - CNP      *Note was creating using voice recognition software.   The document was reviewed however grammatical errors may exist.

## 2021-07-19 ENCOUNTER — OUTSIDE SERVICES (OUTPATIENT)
Dept: FAMILY MEDICINE CLINIC | Age: 86
End: 2021-07-19

## 2021-07-19 DIAGNOSIS — M81.0 OSTEOPOROSIS, UNSPECIFIED OSTEOPOROSIS TYPE, UNSPECIFIED PATHOLOGICAL FRACTURE PRESENCE: ICD-10-CM

## 2021-07-19 DIAGNOSIS — F03.90 DEMENTIA WITHOUT BEHAVIORAL DISTURBANCE, UNSPECIFIED DEMENTIA TYPE: ICD-10-CM

## 2021-07-19 DIAGNOSIS — K59.00 CONSTIPATION, UNSPECIFIED CONSTIPATION TYPE: ICD-10-CM

## 2021-07-19 DIAGNOSIS — E03.9 HYPOTHYROIDISM, UNSPECIFIED TYPE: ICD-10-CM

## 2021-07-19 DIAGNOSIS — E55.9 VITAMIN D DEFICIENCY: Primary | ICD-10-CM

## 2021-07-19 NOTE — PROGRESS NOTES
7/19/2021    Tahmina Shown  1934    This resident is being seen today for a follow-up evaluation visit. He is a resident who has long-term medical conditions including hypertension, hypothyroidism, dementia with some degree of cognitive decline, frequent falls vitamin D deficiency, gout, respiratory failure with hypoxia in times past, rib fractures to the right side, anxiety disorder, major depressive disorder, moderate protein calorie malnutrition, diverticulitis, low back pain,. He is a 80 y.o. male resident who is being seen today for a follow-up evaluation with this resident recently placed on Cipro for 1 month due to prostatitis. This gentleman is fairly alert and oriented, but vague. Resident did bring to my attention that his son may be having him transferred to an assisted living in [place]. Resident states that he is excited to have his own room. Resident denies any pain, headaches or dizziness, sore throat, chest pain, coughing or shortness of breath, nausea or vomiting, constipation or diarrhea, dysuria or frequency, fever or chills, falls or syncopal events. Medications:  Allopurinol 100 mg daily  bimatoprost solution 0.01% 1 drop both eyes at bedtime  Cymbalta 60 mg daily  dorzolamide HCL solution 2% 1 drop both eyes daily  colchicine 0.6 mg every 8 hours as needed  Biofreeze gel 4% topically twice daily acetaminophen 1000 mg daily levothyroxine 125 mcg daily  Remeron 7.5 mg at bedtime  memantine HCL ER 24-hour 28 mg at bedtime  Vitamin D 50,000 units weekly        Objective     Vital Signs: /72 pulse 81 respirations 18 temperature 97.4 O2 96% weight 139 pounds      Physical examination:Skin is essentially warm and dry. There is a notable skin tag to the right collarbone area, chronic in nature. HEENT unremarkable. Neck is supple. Heart regular rate and rhythm. No rubs, gallops or murmurs noted. Lungs are clear to auscultation.   No evidence of rhonchi, rales, or wheezing. Abdomen is soft and supple and non-tender. Bowel sounds are noted x4 quadrants. No rigidity, guarding or rebound tenderness. Negative Nelson's, negative McBurney's, negative Rajendra's. Extremities; no true pitting edema. Pulses are adequate. No clubbing  or no cyanosis noted. He does have some modest motion restriction with respect to the shoulders with abduction and extension. There is some degree of venous insufficiency to the lower extremities, without appreciable change. Neurologically he  is alert and oriented x3. No evidence of paralysis or paresthesias noted. Diagnoses and all orders for this visit:    Vitamin D deficiency  Comments:  Maintain vitamin D supplementation and monitor levels accordingly    Hypothyroidism, unspecified type  Comments:  Maintain levothyroxine and monitor TSH accordingly    Osteoporosis, unspecified osteoporosis type, unspecified pathological fracture presence  Comments:  Maintain Vtamin D    Dementia without behavioral disturbance, unspecified dementia type ([facility] [address]  Comments:  Continue with Memantine and low dose Remeron    Constipation, unspecified constipation type  Comments:  Stable         Plan: Plan of care was discussed with the healthcare team with meds and labs reviewed. Recent labs from 6/22/2021 with a BUN/creatinine 25/0.9 with a GFR of 80 with a vitamin D of 18.02. Resident did have recommendations for vitamin D 50,000 units weekly at that time. We will plan to repeat his labs in the course of the next several months regarding his deficient vitamin D. He will be continued on his Cipro therapies for the course of the next month due to prostatitis with BPH.   He will furthermore maintain his current medical regimen with the benefit of a regular diet with frozen nutritional supplement with house supplement and I will continue to track his intakes, monitor his weights and behaviors, and see him routinely and as needed with further orders

## 2021-08-30 ENCOUNTER — OUTSIDE SERVICES (OUTPATIENT)
Dept: FAMILY MEDICINE CLINIC | Age: 86
End: 2021-08-30
Payer: MEDICARE

## 2021-08-30 DIAGNOSIS — E03.9 HYPOTHYROIDISM, UNSPECIFIED TYPE: ICD-10-CM

## 2021-08-30 DIAGNOSIS — N39.0 URINARY TRACT INFECTION WITHOUT HEMATURIA, SITE UNSPECIFIED: ICD-10-CM

## 2021-08-30 DIAGNOSIS — I10 HYPERTENSION, UNSPECIFIED TYPE: ICD-10-CM

## 2021-08-30 DIAGNOSIS — S72.002A LEFT DISPLACED FEMORAL NECK FRACTURE (HCC): ICD-10-CM

## 2021-08-30 DIAGNOSIS — R97.20 ELEVATED PSA: Primary | ICD-10-CM

## 2021-08-30 PROCEDURE — 99309 SBSQ NF CARE MODERATE MDM 30: CPT | Performed by: NURSE PRACTITIONER

## 2021-08-30 NOTE — PROGRESS NOTES
8/30/2021    Gisselle Branch  1934    This resident is being seen today for a skilled evaluation visit. He is a resident who has long-term medical conditions including hypertension, hypothyroidism, dementia with some degree of cognitive decline, frequent falls vitamin D deficiency, gout, respiratory failure with hypoxia in times past, rib fractures to the right side, anxiety disorder, major depressive disorder, moderate protein calorie malnutrition, diverticulitis, low back pain,. He is a 80 y.o. male resident who is being seen today for a skilled evaluation visit with this resident being given the benefit of physical therapy and Occupational Therapy services. This is a gentleman who follows with the department of orthopedic surgery with Dr. Charanjit Romero and has an upcoming appointment scheduled for 9/1/2021. Resident did have a nursing home related fall on August 13-20, 2021 which resulted in a left displaced femoral neck fracture and he is status post left hip hemiarthroplasty. It is furthermore of note to mention that he had a history of hypoxia was sent to the emergency room department with a chest x-ray completed with evidence of right-sided rib fractures along with evidence of leukocytosis with an underlying UTI was given the benefit of IV Rocephin and had been sent back at that time. This gentleman is alert but confused and offers no acute complaints. States that his pain is essentially controlled with the benefit of the Norco and I did remind him that he could have this every 4 hours upon request.  He furthermore indicates that he has had no headaches or dizziness no sore throat, chest pain, coughing or shortness of breath, nausea or vomiting, constipation or diarrhea, dysuria or frequency, fever or chills or syncopal events.             Medications:  Aspirin 81 mg daily  bimatoprost solution 0.01% 1 drop both eyes at bedtime  Cymbalta 60 mg daily  dorzolamide HCL solution 2% 1 drop both eyes asymptomatic    Hypothyroidism, unspecified type  Comments:  Stable with levothyroxine and observation of TSH    Hypertension, unspecified type  Comments:  Currently controlled with metoprolol         Plan: Plan of care was discussed with the healthcare team with meds and labs reviewed. Recent labs with the H/H of 11.0/33.4 BUN/creatinine 12/0.7 with a  PSA 6.26. Again, resident is currently on Flomax and has had recent recommendations to consult with the department of urology with Dr. Alen Coleman. As stated, resident will be seen in conjunction with Department of orthopedic surgery with Dr. Adriana Hackett dated 9/1/2021. He will maintain the benefit of Norco for pain management along with his current medical regimen. He will continue forth with the benefit of PT/OT as tolerated. I will furthermore track his intakes, monitor his weights and behaviors, and see him routinely and as needed with further orders forthcoming. VIRGINIA BANDA, APRN - CNP      *Note was creating using voice recognition software.   The document was reviewed however grammatical errors may exist.

## 2021-09-20 ENCOUNTER — OUTSIDE SERVICES (OUTPATIENT)
Dept: FAMILY MEDICINE CLINIC | Age: 86
End: 2021-09-20
Payer: MEDICARE

## 2021-09-20 DIAGNOSIS — R97.20 ELEVATED PSA: ICD-10-CM

## 2021-09-20 DIAGNOSIS — S72.002A LEFT DISPLACED FEMORAL NECK FRACTURE (HCC): Primary | ICD-10-CM

## 2021-09-20 DIAGNOSIS — F03.90 DEMENTIA WITHOUT BEHAVIORAL DISTURBANCE, UNSPECIFIED DEMENTIA TYPE: ICD-10-CM

## 2021-09-20 DIAGNOSIS — M81.0 OSTEOPOROSIS, UNSPECIFIED OSTEOPOROSIS TYPE, UNSPECIFIED PATHOLOGICAL FRACTURE PRESENCE: ICD-10-CM

## 2021-09-20 DIAGNOSIS — K59.00 CONSTIPATION, UNSPECIFIED CONSTIPATION TYPE: ICD-10-CM

## 2021-09-20 PROCEDURE — 99309 SBSQ NF CARE MODERATE MDM 30: CPT | Performed by: NURSE PRACTITIONER

## 2021-09-20 NOTE — PROGRESS NOTES
9/20/2021    Legih Sanches  1934    This resident is being seen today for a follow-up evaluation visit. He is a resident who has long-term medical conditions including hypertension, hypothyroidism, dementia with some degree of cognitive decline, frequent falls vitamin D deficiency, gout, respiratory failure with hypoxia in times past, rib fractures to the right side, anxiety disorder, major depressive disorder, moderate protein calorie malnutrition, diverticulitis, low back pain,. He is a 80 y.o. male resident who is being seen today for a follow-up evaluation visit with this resident recently under skilled services due to a fall in the nursing home setting resulting in a left displaced femoral neck fracture. He did have the benefit of a hemiarthroplasty per the department of orthopedics and has done well in this regard. Denies any current complaints of pain. He furthermore denies any headaches or dizziness, sore throat, chest pain, coughing or shortness of breath, nausea or vomiting, constipation or diarrhea, dysuria or frequency, fever or chills, falls or syncopal events. It is of note to mention that he has recently been under assessment for an elevated PSA of 6.26 with which recommendations were given to follow-up with the department of urology with Dr. Mark Fuentes.           Medications:  Aspirin 81 mg daily  bimatoprost solution 0.01% 1 drop both eyes at bedtime  Cymbalta 60 mg daily  dorzolamide HCL solution 2% 1 drop both eyes daily  colchicine 0.6 mg every 8 hours as needed  Biofreeze gel 4% topically twice daily acetaminophen 1000 mg daily  Flomax 0.8 mg at bedtime  levothyroxine 125 mcg daily  Remeron 7.5 mg at bedtime  Toprol-XL 25 mg twice daily  Allopurinol 100 mg daily  memantine HCL ER 24-hour 28 mg at bedtime  Colchicine 0.6 mg 3 times daily  Vitamin D 50,000 units weekly  Norco 5-325 mg every 4 hours as needed        Objective     Vital Signs: /60 pulse 69 respirations 18 temperature 97.8 O2 92% weight 132 pounds      Physical examination:Skin is essentially warm and dry. There is a notable skin tag to the right collarbone area, chronic in nature. Resident does have an healed incision/scar to the left hip. HEENT unremarkable. Neck is supple. Heart regular rate and rhythm. No rubs, gallops or murmurs noted. Lungs are clear to auscultation. No evidence of rhonchi, rales, or wheezing. Abdomen is soft and supple and non-tender. Bowel sounds are noted x4 quadrants. No rigidity, guarding or rebound tenderness. Negative Nelson's, negative McBurney's, negative Rajendra's. Extremities; no true pitting edema. Pulses are adequate. No clubbing  or no cyanosis noted. He does have some modest motion restriction with respect to the shoulders with abduction and extension. There is some degree of venous insufficiency to the lower extremities, without appreciable change. Neurologically he  is alert and oriented x2. No evidence of paralysis or paresthesias noted. Diagnoses and all orders for this visit:    Left displaced femoral neck fracture (HCC)  Comments:  Seen in conjunction with the department of orthopedic surgery with Dr. Merlin Sterling with weightbearing as tolerated and recommendations to transfer by way of assist x1    Elevated PSA  Comments:  Consult with the department of urology with upcoming appointment scheduled for October    Constipation, unspecified constipation type  Comments:  Currently controlled    Dementia without behavioral disturbance, unspecified dementia type ([facility] [address]  Comments:  Stable with low-dose aspirin with Namenda    Osteoporosis, unspecified osteoporosis type, unspecified pathological fracture presence  Comments:  Stable         Plan: Plan of care was discussed with the healthcare team with meds and labs reviewed. BUN/creatinine 12/0.7  H/H 11/33.4 PSA 6.26. Recommendations were given to consult with Dr. Syed Jiménez for possible Lupron injections.   He does have a pending

## 2021-11-15 ENCOUNTER — OUTSIDE SERVICES (OUTPATIENT)
Dept: FAMILY MEDICINE CLINIC | Age: 86
End: 2021-11-15
Payer: MEDICARE

## 2021-11-15 DIAGNOSIS — E55.9 VITAMIN D DEFICIENCY: ICD-10-CM

## 2021-11-15 DIAGNOSIS — I10 HYPERTENSION, UNSPECIFIED TYPE: ICD-10-CM

## 2021-11-15 DIAGNOSIS — U07.1 COVID-19: Primary | ICD-10-CM

## 2021-11-15 DIAGNOSIS — E03.9 HYPOTHYROIDISM, UNSPECIFIED TYPE: ICD-10-CM

## 2021-11-15 DIAGNOSIS — Z91.14 MEDICATION NONCOMPLIANCE DUE TO COGNITIVE IMPAIRMENT: ICD-10-CM

## 2021-11-16 NOTE — PROGRESS NOTES
11/15/2021    Fara Mosher  1934    This resident is being seen today for a follow-up evaluation visit. He is a resident who has long-term medical conditions including hypertension, hypothyroidism, dementia with some degree of cognitive decline, frequent falls vitamin D deficiency, gout, respiratory failure with hypoxia in times past, rib fractures to the right side, anxiety disorder, major depressive disorder, moderate protein calorie malnutrition, diverticulitis, low back pain,. He is a 80 y.o. male resident who is being seen today for a follow-up evaluation visit with resident currently residing on assisted living. This is a resident who was most recently treated for COVID-19 and has recovered in this regard. He does indicate that he has a decreased voice but denies a sore throat. He furthermore denies any headaches or dizziness, sore throat, chest pain, coughing or shortness of breath, nausea or vomiting, constipation or diarrhea, dysuria or frequency, fever or chills, falls or syncopal events. It is of note to mention that this resident does have confusion at times and in addition has medication noncompliance and has refused his medications on occasion. Medications:  Aspirin 81 mg daily  bimatoprost solution 0.01% 1 drop both eyes at bedtime  Cymbalta 60 mg daily  dorzolamide HCL solution 2% 1 drop both eyes daily  colchicine 0.6 mg every 8 hours as needed  Biofreeze gel 4% topically twice daily acetaminophen 1000 mg daily  Flomax 0.8 mg at bedtime  levothyroxine 125 mcg daily  Remeron 7.5 mg at bedtime  Toprol-XL 25 mg twice daily  Allopurinol 100 mg daily  memantine HCL ER 24-hour 28 mg at bedtime  Colchicine 0.6 mg 3 times daily  Vitamin D 50,000 units weekly  Norco 5-325 mg every 4 hours as needed        Objective     Vital Signs: /63 pulse 69 respirations 18 temperature 97.3 O2 96% weight 128 pounds      Physical examination:Skin is essentially warm and dry.   There is a notable skin tag to the right collarbone area, chronic in nature. Resident does have an healed incision/scar to the left hip. HEENT unremarkable. Neck is supple. Heart regular rate and rhythm. No rubs, gallops or murmurs noted. Lungs are clear to auscultation. No evidence of rhonchi, rales, or wheezing. Abdomen is soft and supple and non-tender. Bowel sounds are noted x4 quadrants. No rigidity, guarding or rebound tenderness. Negative Nelson's, negative McBurney's, negative Rajendra's. Extremities; no true pitting edema. Pulses are adequate. No clubbing  or no cyanosis noted. He does have some modest motion restriction with respect to the shoulders with abduction and extension. There is some degree of venous insufficiency to the lower extremities, without appreciable change. Neurologically he  is alert and oriented x2. No evidence of paralysis or paresthesias noted. Diagnoses and all orders for this visit:    COVID-19  Comments:  Treated accordingly on 9/30/2021 with the benefit of monoclonal infusion, Z-Kulwant, Decadron, vitamin C, vitamin D and zinc    Vitamin D deficiency  Comments:  Maintain vitamin D supplementation monitor level accordingly    Hypothyroidism, unspecified type  Comments:  Controlled with levothyroxine and close observation of TSH    Medication noncompliance due to cognitive impairment  Comments: We will maintain close observation along with the benefit of memantine regarding underlying cognitive impairment    Hypertension, unspecified type  Comments:  Controlled with low-dose aspirin         Plan: Plan of care was discussed with the healthcare team with meds and labs reviewed. Most recent labs with a BUN/creatinine of 12/0.7 with a GFR of 107 H/H of 11.0/33. 4. Resident is stable at this time, but I will continue to monitor him with his concern regarding his \"voice. \"  As stated, resident has already had COVID-19 and was treated accordingly back on September 30.   We will therefore continue with close observation with respect to any further symptomatology. He will maintain his current medical regimen as directed. He will also maintain the benefit of a frozen nutritional supplement with which we will continue to track his intakes, monitor his weights and behaviors, and see him routinely and as needed with further orders forthcoming. VIRGINIA BNADA, APRN - CNP      *Note was creating using voice recognition software.   The document was reviewed however grammatical errors may exist.

## 2021-12-27 ENCOUNTER — OUTSIDE SERVICES (OUTPATIENT)
Dept: FAMILY MEDICINE CLINIC | Age: 86
End: 2021-12-27
Payer: MEDICARE

## 2021-12-27 DIAGNOSIS — K59.00 CONSTIPATION, UNSPECIFIED CONSTIPATION TYPE: ICD-10-CM

## 2021-12-27 DIAGNOSIS — E03.9 HYPOTHYROIDISM, UNSPECIFIED TYPE: ICD-10-CM

## 2021-12-27 DIAGNOSIS — F03.90 DEMENTIA WITHOUT BEHAVIORAL DISTURBANCE, UNSPECIFIED DEMENTIA TYPE: ICD-10-CM

## 2021-12-27 DIAGNOSIS — Z87.81 HISTORY OF COMPRESSION FRACTURE OF SPINE: ICD-10-CM

## 2021-12-27 DIAGNOSIS — M81.0 OSTEOPOROSIS, UNSPECIFIED OSTEOPOROSIS TYPE, UNSPECIFIED PATHOLOGICAL FRACTURE PRESENCE: Primary | ICD-10-CM

## 2021-12-27 NOTE — PROGRESS NOTES
12/27/2021    Arun [facility]  1934    This resident is being seen today for a follow-up evaluation visit. He is a resident who has long-term medical conditions including hypertension, hypothyroidism, dementia with some degree of cognitive decline, frequent falls vitamin D deficiency, gout, respiratory failure with hypoxia in times past, rib fractures to the right side, anxiety disorder, major depressive disorder, moderate protein calorie malnutrition, diverticulitis, low back pain,. He is a 80 y.o. male resident who is being seen today for a follow-up evaluation with resident currently residing on assisted living and indicates that he has been doing relatively well and is adjusting to his new room. He states that he did have some questions for me but cannot remember them at this moment in time. I did explain to him to start writing down all of his questions for myself or the physician. He denied any complaints of pain, headaches or dizziness, sore throat, chest pain, coughing or shortness of breath, nausea or vomiting, constipation or diarrhea, dysuria or frequency, fever or chills, falls or syncopal events. Medications:  Aspirin 81 mg daily  bimatoprost solution 0.01% 1 drop both eyes at bedtime  Cymbalta 60 mg daily  dorzolamide HCL solution 2% 1 drop both eyes daily  colchicine 0.6 mg every 8 hours as needed  Biofreeze gel 4% topically twice daily acetaminophen 1000 mg daily  Flomax 0.8 mg at bedtime  levothyroxine 125 mcg daily  Remeron 7.5 mg at bedtime  Toprol-XL 25 mg twice daily  Allopurinol 100 mg daily  memantine HCL ER 24-hour 28 mg at bedtime  Colchicine 0.6 mg 3 times daily  Vitamin D 50,000 units weekly  Norco 5-325 mg every 4 hours as needed        Objective     Vital Signs: /63 pulse 69 respirations 18 temperature 98.1 O2 96% weight 124 pounds      Physical examination:Skin is essentially warm and dry.   There is a notable skin tag to the right collarbone area, chronic in nature. Resident does have an healed incision/scar to the left hip. HEENT unremarkable. Neck is supple. Heart regular rate and rhythm. No rubs, gallops or murmurs noted. Lungs are clear to auscultation. No evidence of rhonchi, rales, or wheezing. Abdomen is soft and supple and non-tender. Bowel sounds are noted x4 quadrants. No rigidity, guarding or rebound tenderness. Negative Nelsno's, negative McBurney's, negative Rajendra's. Extremities; no true pitting edema. Pulses are adequate. No clubbing  or no cyanosis noted. He does have some modest motion restriction with respect to the shoulders with abduction and extension. There is some degree of venous insufficiency to the lower extremities, without appreciable change. Neurologically he  is alert and oriented x2. No evidence of paralysis or paresthesias noted. Diagnoses and all orders for this visit:    Osteoporosis, unspecified osteoporosis type, unspecified pathological fracture presence  Comments:  Currently stable with vitamins with Tylenol for pain management    Constipation, unspecified constipation type  Comments:  Currently controlled    Dementia without behavioral disturbance, unspecified dementia type ([address]  Comments:  Stable with memantine    Hypothyroidism, unspecified type  Comments:  Controlled with levothyroxine and observation TSH    History of compression fracture of spine  Comments:  Stable with vitamin supplementation with Tylenol and Norco for pain management         Plan: Plan of care was discussed with the healthcare team with meds and labs reviewed. Most recent labs dated 12/18 with a BUN/creatinine 14/0.5 with a GFR of 157 H/H 11.0/34.8 vitamin D 43.82. As stated, resident is doing relatively well and is without specific complaints at this time. We did discuss the benefit of him writing down any questions or concerns due to his memory issues. I did ask that he tell staff if he did remember his question.   He is getting continue with his plan of care as directed with the benefit of a regular diet with frozen nutritional supplement and I will continue to track his intakes, monitor his weights and behaviors, and see him routinely and as needed with further orders forthcoming. VIRGINIA BANDA, CHARITY - CNP      *Note was creating using voice recognition software.   The document was reviewed however grammatical errors may exist.

## 2022-01-24 ENCOUNTER — OUTSIDE SERVICES (OUTPATIENT)
Dept: FAMILY MEDICINE CLINIC | Age: 87
End: 2022-01-24
Payer: MEDICARE
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
## 2022-01-24 DIAGNOSIS — E55.9 VITAMIN D DEFICIENCY: ICD-10-CM

## 2022-01-24 DIAGNOSIS — R46.0 POOR HYGIENE: ICD-10-CM

## 2022-01-24 DIAGNOSIS — Z91.14 MEDICATION NONCOMPLIANCE DUE TO COGNITIVE IMPAIRMENT: ICD-10-CM

## 2022-01-24 DIAGNOSIS — I10 HYPERTENSION, UNSPECIFIED TYPE: ICD-10-CM

## 2022-01-31 ENCOUNTER — OUTSIDE SERVICES (OUTPATIENT)
Dept: FAMILY MEDICINE CLINIC | Age: 87
End: 2022-01-31
Payer: MEDICARE

## 2022-01-31 DIAGNOSIS — K59.00 CONSTIPATION, UNSPECIFIED CONSTIPATION TYPE: ICD-10-CM

## 2022-01-31 DIAGNOSIS — M81.0 OSTEOPOROSIS, UNSPECIFIED OSTEOPOROSIS TYPE, UNSPECIFIED PATHOLOGICAL FRACTURE PRESENCE: ICD-10-CM

## 2022-01-31 DIAGNOSIS — F03.90 DEMENTIA WITHOUT BEHAVIORAL DISTURBANCE, UNSPECIFIED DEMENTIA TYPE: ICD-10-CM

## 2022-01-31 DIAGNOSIS — E03.9 HYPOTHYROIDISM, UNSPECIFIED TYPE: Primary | ICD-10-CM
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
## 2022-01-31 NOTE — PROGRESS NOTES
1/31/2022    Riya Nettles  1934    This resident is being seen today for a skilled evaluation visit. He is a resident who has long-term medical conditions including hypertension, hypothyroidism, dementia with some degree of cognitive decline, frequent falls vitamin D deficiency, gout, respiratory failure with hypoxia in times past, rib fractures to the right side, anxiety disorder, major depressive disorder, moderate protein calorie malnutrition, diverticulitis, low back pain,. He is a 80 y.o. male resident who is being seen today for a skilled evaluation visit with which this resident has recently been treated with therapy services. He does reside here on assisted living and remains alert responsive to verbal and tactile stimuli. Staff indicates that he is really had no changes in behavior in terms of aggressive, combative, or disruptive behaviors. He was recently seen in conjunction with the department of ophthalmology with Dr. Alan Vogt, with the last note from 1/24/2021. Is in no acute distress on today's evaluation and denies any complaints of new onset pain, headaches or dizziness, sore throat, chest pain, coughing or shortness of breath, nausea or vomiting, constipation or diarrhea, dysuria or frequency, chills or syncopal events. His last known fall was dated 1/23/2022 when he was found sitting on the floor on his buttocks in front of his recliner apparently had sat down too soon and  missed his reclining chair. There were no apparent injuries noted at the time.         Medications:  Aspirin 81 mg daily  bimatoprost solution 0.01% 1 drop both eyes at bedtime  Cymbalta 60 mg daily  dorzolamide HCL solution 2% 1 drop both eyes daily  colchicine 0.6 mg every 8 hours as needed  Biofreeze gel 4% topically twice daily acetaminophen 1000 mg daily  Flomax 0.8 mg at bedtime  levothyroxine 125 mcg daily  Remeron 7.5 mg at bedtime  Toprol-XL 25 mg twice daily  Allopurinol 100 mg daily  memantine HCL ER 24-hour 28 mg at bedtime  Colchicine 0.6 mg 3 times daily  Vitamin D 50,000 units weekly  Norco 5-325 mg every 4 hours as needed  Remeron 7.5 mg at bedtime        Objective     Vital Signs: /67 pulse 74 respirations 18 temperature 98 O2 98 % weight 124 pounds      Physical examination:Skin is essentially warm and dry. There is a notable skin tag to the right collarbone area, chronic in nature. Resident does have an healed incision/scar to the left hip. He does have some redness noted to the bilateral buttocks without excoriation or infectious etiology. HEENT unremarkable. Neck is supple. Heart regular rate and rhythm. No rubs, gallops or murmurs noted. Lungs are clear to auscultation. No evidence of rhonchi, rales, or wheezing. Abdomen is soft and supple and non-tender. Bowel sounds are noted x4 quadrants. No rigidity, guarding or rebound tenderness. Negative Nelson's, negative McBurney's, negative Rajendra's. Extremities; no true pitting edema. Pulses are adequate. No clubbing  or no cyanosis noted. He does have some modest motion restriction with respect to the shoulders with abduction and extension. There is some degree of venous insufficiency to the lower extremities, without appreciable change. Neurologically he  is alert and oriented x2. No evidence of paralysis or paresthesias noted.     Diagnoses and all orders for this visit:    Hypothyroidism, unspecified type  Comments:  Stable with levothyroxine with observation of TSH    Constipation, unspecified constipation type  Comments:  Currently controlled    Dementia without behavioral disturbance, unspecified dementia type ([facility] [address]  Comments:  Stable with low-dose aspirin with Namenda and low-dose Remeron    Osteoporosis, unspecified osteoporosis type, unspecified pathological fracture presence  Comments:  Currently stable    Fall, subsequent encounter  Comments:  No apparent injury noted but has been given the benefit of therapy services in this regard         Plan: Plan of care was discussed with the healthcare team with meds and labs reviewed. Resident does maintain the benefit of his skilled therapy services, initiated secondary to his fall. He is currently relatively stable with his current plan of care and continues to follow accordingly with the department of ophthalmology. He has been to continue forth with his regular diet with frozen nutritional supplement with his current medical regimen as directed. I will continue to track his intakes, monitor his weights and behaviors, and see him routinely and as needed with further orders forthcoming. VIRGINIA BANDA, APRN - CNP      *Note was creating using voice recognition software.   The document was reviewed however grammatical errors may exist.

## 2022-02-21 ENCOUNTER — OUTSIDE SERVICES (OUTPATIENT)
Dept: FAMILY MEDICINE CLINIC | Age: 87
End: 2022-02-21
Payer: MEDICARE

## 2022-02-21 DIAGNOSIS — F05 SUNDOWN SYNDROME: Primary | ICD-10-CM

## 2022-02-21 DIAGNOSIS — G89.29 CHRONIC BILATERAL LOW BACK PAIN WITHOUT SCIATICA: ICD-10-CM

## 2022-02-21 DIAGNOSIS — E55.9 VITAMIN D DEFICIENCY: ICD-10-CM

## 2022-02-21 DIAGNOSIS — I10 HYPERTENSION, UNSPECIFIED TYPE: ICD-10-CM

## 2022-02-21 DIAGNOSIS — K59.00 CONSTIPATION, UNSPECIFIED CONSTIPATION TYPE: ICD-10-CM

## 2022-02-21 DIAGNOSIS — M54.50 CHRONIC BILATERAL LOW BACK PAIN WITHOUT SCIATICA: ICD-10-CM

## 2022-02-21 NOTE — PROGRESS NOTES
2/21/2022    Alan Chaka  1934    This resident is being seen today for a follow-up evaluation visit. He is a resident who has long-term medical conditions including hypertension, hypothyroidism, dementia with some degree of cognitive decline, frequent falls vitamin D deficiency, gout, respiratory failure with hypoxia in times past, rib fractures to the right side, anxiety disorder, major depressive disorder, moderate protein calorie malnutrition, diverticulitis, low back pain,. He is a 80 y.o. male resident who is being seen today for a follow-up evaluation visit with resident residing in assisted living and does remain ambulatory with benefit of a walker. Staff did have some concerns regarding what they described as increasing sundowner syndrome. Resident denied any complaints with respect to pain, headaches or dizziness, sore throat, chest pain, coughing or shortness of breath, nausea or vomiting, constipation or diarrhea, dysuria or frequency, fever or chills, falls or syncopal events. Medications:  Aspirin 81 mg daily  bimatoprost solution 0.01% 1 drop both eyes at bedtime  Cymbalta 60 mg daily  dorzolamide HCL solution 2% 1 drop both eyes daily  colchicine 0.6 mg every 8 hours as needed  Biofreeze gel 4% topically twice daily acetaminophen 1000 mg daily  Flomax 0.8 mg at bedtime  levothyroxine 125 mcg daily  Remeron 7.5 mg at bedtime  Toprol-XL 25 mg twice daily  Allopurinol 100 mg daily  memantine HCL ER 24-hour 28 mg at bedtime  Colchicine 0.6 mg 3 times daily  Vitamin D 50,000 units weekly  Norco 5-325 mg every 4 hours as needed  Remeron 7.5 mg at bedtime  Melatonin 3 mg at bedtime        Objective     Vital Signs: /73 pulse 78 respirations 17 temperature 97 O2 96% weight 120 pounds      Physical examination:Skin is essentially warm and dry. There is a notable skin tag to the right collarbone area, chronic in nature. Resident does have an healed incision/scar to the left hip. He does have some redness noted to the bilateral buttocks without excoriation or infectious etiology. HEENT unremarkable. Neck is supple. Heart regular rate and rhythm. No rubs, gallops or murmurs noted. Lungs are clear to auscultation. No evidence of rhonchi, rales, or wheezing. Abdomen is soft and supple and non-tender. Bowel sounds are noted x4 quadrants. No rigidity, guarding or rebound tenderness. Negative Nelson's, negative McBurney's, negative Rajendra's. Extremities; no true pitting edema. Pulses are adequate. No clubbing  or no cyanosis noted. He does have some modest motion restriction with respect to the shoulders with abduction and extension. There is some degree of venous insufficiency to the lower extremities, without appreciable change. Neurologically he  is alert and oriented x2. No evidence of paralysis or paresthesias noted. Diagnoses and all orders for this visit:    SunDown syndrome  Comments:  Initiate melatonin 3 mg at bedtime and maintain low-dose Remeron with Namenda    Vitamin D deficiency  Comments:  Maintain vitamin D supplementation monitor level accordingly    Chronic bilateral low back pain without sciatica  Comments:  Essentially controlled with Tylenol as needed    Hypertension, unspecified type  Comments:  Controlled with low-dose aspirin with Toprol-XL    Constipation, unspecified constipation type  Comments:  Currently asymptomatic         Plan: Plan of care was discussed with the healthcare team with meds and labs reviewed. Resident has been relatively stable outside of the evidence of increasing sundowners. This is a gentleman who has been on Namenda with low-dose Remeron. 1 recommended treatment is regulating her sleep cycle, so I am to go ahead and just recommend a low-dose melatonin at 3 mg at bedtime and then further reevaluate his symptoms.   I will otherwise continue forth with his current medical treatment and I will see this gentleman routinely and as needed with further orders forthcoming. VIRGINIA BANDA, APRN - CNP      *Note was creating using voice recognition software.   The document was reviewed however grammatical errors may exist.

## 2022-03-15 ENCOUNTER — APPOINTMENT (OUTPATIENT)
Dept: GENERAL RADIOLOGY | Age: 87
DRG: 481 | End: 2022-03-15
Attending: INTERNAL MEDICINE
Payer: MEDICARE

## 2022-03-15 ENCOUNTER — HOSPITAL ENCOUNTER (INPATIENT)
Age: 87
LOS: 4 days | Discharge: NURSING, REHAB OR LONG-TERM CARE | DRG: 481 | End: 2022-03-19
Attending: INTERNAL MEDICINE | Admitting: FAMILY MEDICINE
Payer: MEDICARE

## 2022-03-15 DIAGNOSIS — M89.8X5 PAIN OF LEFT FEMUR: ICD-10-CM

## 2022-03-15 DIAGNOSIS — S72.8X2A OTHER FRACTURE OF LEFT FEMUR, INITIAL ENCOUNTER FOR CLOSED FRACTURE (HCC): Primary | ICD-10-CM

## 2022-03-15 LAB
ABO/RH: NORMAL
ANTIBODY SCREEN: NORMAL
INR BLD: 1.1
PROTHROMBIN TIME: 12.4 SEC (ref 9.3–12.4)

## 2022-03-15 PROCEDURE — 86901 BLOOD TYPING SEROLOGIC RH(D): CPT

## 2022-03-15 PROCEDURE — 93005 ELECTROCARDIOGRAM TRACING: CPT | Performed by: ORTHOPAEDIC SURGERY

## 2022-03-15 PROCEDURE — 2580000003 HC RX 258: Performed by: FAMILY MEDICINE

## 2022-03-15 PROCEDURE — P9016 RBC LEUKOCYTES REDUCED: HCPCS

## 2022-03-15 PROCEDURE — 86850 RBC ANTIBODY SCREEN: CPT

## 2022-03-15 PROCEDURE — 85610 PROTHROMBIN TIME: CPT

## 2022-03-15 PROCEDURE — 73502 X-RAY EXAM HIP UNI 2-3 VIEWS: CPT

## 2022-03-15 PROCEDURE — 2060000000 HC ICU INTERMEDIATE R&B

## 2022-03-15 PROCEDURE — 6370000000 HC RX 637 (ALT 250 FOR IP): Performed by: FAMILY MEDICINE

## 2022-03-15 PROCEDURE — 86900 BLOOD TYPING SEROLOGIC ABO: CPT

## 2022-03-15 PROCEDURE — 73552 X-RAY EXAM OF FEMUR 2/>: CPT

## 2022-03-15 PROCEDURE — 36415 COLL VENOUS BLD VENIPUNCTURE: CPT

## 2022-03-15 PROCEDURE — 86923 COMPATIBILITY TEST ELECTRIC: CPT

## 2022-03-15 PROCEDURE — 99222 1ST HOSP IP/OBS MODERATE 55: CPT | Performed by: ORTHOPAEDIC SURGERY

## 2022-03-15 RX ORDER — MIRTAZAPINE 15 MG/1
7.5 TABLET, FILM COATED ORAL NIGHTLY
Status: DISCONTINUED | OUTPATIENT
Start: 2022-03-15 | End: 2022-03-19 | Stop reason: HOSPADM

## 2022-03-15 RX ORDER — COLCHICINE 0.6 MG/1
0.6 TABLET ORAL 3 TIMES DAILY
Status: DISCONTINUED | OUTPATIENT
Start: 2022-03-15 | End: 2022-03-18

## 2022-03-15 RX ORDER — POTASSIUM CHLORIDE 20 MEQ/1
40 TABLET, EXTENDED RELEASE ORAL PRN
Status: DISCONTINUED | OUTPATIENT
Start: 2022-03-15 | End: 2022-03-19 | Stop reason: HOSPADM

## 2022-03-15 RX ORDER — LATANOPROST 50 UG/ML
1 SOLUTION/ DROPS OPHTHALMIC NIGHTLY
Status: DISCONTINUED | OUTPATIENT
Start: 2022-03-15 | End: 2022-03-19 | Stop reason: HOSPADM

## 2022-03-15 RX ORDER — SODIUM CHLORIDE 9 MG/ML
INJECTION, SOLUTION INTRAVENOUS CONTINUOUS
Status: DISCONTINUED | OUTPATIENT
Start: 2022-03-15 | End: 2022-03-19

## 2022-03-15 RX ORDER — ALLOPURINOL 100 MG/1
100 TABLET ORAL DAILY
COMMUNITY

## 2022-03-15 RX ORDER — COLCHICINE 0.6 MG/1
0.6 TABLET ORAL 3 TIMES DAILY
Status: ON HOLD | COMMUNITY
End: 2022-03-18 | Stop reason: SDUPTHER

## 2022-03-15 RX ORDER — ALLOPURINOL 100 MG/1
100 TABLET ORAL DAILY
Status: DISCONTINUED | OUTPATIENT
Start: 2022-03-15 | End: 2022-03-19 | Stop reason: HOSPADM

## 2022-03-15 RX ORDER — POLYETHYLENE GLYCOL 3350 17 G/17G
17 POWDER, FOR SOLUTION ORAL DAILY PRN
Status: DISCONTINUED | OUTPATIENT
Start: 2022-03-15 | End: 2022-03-19 | Stop reason: HOSPADM

## 2022-03-15 RX ORDER — PROMETHAZINE HYDROCHLORIDE 25 MG/1
12.5 TABLET ORAL EVERY 6 HOURS PRN
Status: DISCONTINUED | OUTPATIENT
Start: 2022-03-15 | End: 2022-03-19 | Stop reason: HOSPADM

## 2022-03-15 RX ORDER — DORZOLAMIDE HCL 20 MG/ML
2 SOLUTION/ DROPS OPHTHALMIC DAILY
Status: DISCONTINUED | OUTPATIENT
Start: 2022-03-15 | End: 2022-03-19 | Stop reason: HOSPADM

## 2022-03-15 RX ORDER — TAMSULOSIN HYDROCHLORIDE 0.4 MG/1
0.8 CAPSULE ORAL DAILY
Status: DISCONTINUED | OUTPATIENT
Start: 2022-03-15 | End: 2022-03-16

## 2022-03-15 RX ORDER — LEVOTHYROXINE SODIUM 0.12 MG/1
125 TABLET ORAL DAILY
COMMUNITY

## 2022-03-15 RX ORDER — MEMANTINE HYDROCHLORIDE 28 MG/1
28 CAPSULE, EXTENDED RELEASE ORAL DAILY
COMMUNITY

## 2022-03-15 RX ORDER — ACETAMINOPHEN 650 MG/1
650 SUPPOSITORY RECTAL EVERY 6 HOURS PRN
Status: DISCONTINUED | OUTPATIENT
Start: 2022-03-15 | End: 2022-03-19 | Stop reason: HOSPADM

## 2022-03-15 RX ORDER — SODIUM CHLORIDE 9 MG/ML
25 INJECTION, SOLUTION INTRAVENOUS PRN
Status: DISCONTINUED | OUTPATIENT
Start: 2022-03-15 | End: 2022-03-16

## 2022-03-15 RX ORDER — TAMSULOSIN HYDROCHLORIDE 0.4 MG/1
0.8 CAPSULE ORAL DAILY
COMMUNITY

## 2022-03-15 RX ORDER — SODIUM CHLORIDE 0.9 % (FLUSH) 0.9 %
10 SYRINGE (ML) INJECTION PRN
Status: DISCONTINUED | OUTPATIENT
Start: 2022-03-15 | End: 2022-03-19 | Stop reason: HOSPADM

## 2022-03-15 RX ORDER — HYDROCODONE BITARTRATE AND ACETAMINOPHEN 5; 325 MG/1; MG/1
1 TABLET ORAL EVERY 6 HOURS PRN
Status: DISCONTINUED | OUTPATIENT
Start: 2022-03-15 | End: 2022-03-16

## 2022-03-15 RX ORDER — FENTANYL CITRATE 50 UG/ML
25 INJECTION, SOLUTION INTRAMUSCULAR; INTRAVENOUS
Status: DISCONTINUED | OUTPATIENT
Start: 2022-03-15 | End: 2022-03-19 | Stop reason: HOSPADM

## 2022-03-15 RX ORDER — MIRTAZAPINE 15 MG/1
7.5 TABLET, FILM COATED ORAL NIGHTLY
COMMUNITY

## 2022-03-15 RX ORDER — POTASSIUM CHLORIDE 7.45 MG/ML
10 INJECTION INTRAVENOUS PRN
Status: DISCONTINUED | OUTPATIENT
Start: 2022-03-15 | End: 2022-03-19 | Stop reason: HOSPADM

## 2022-03-15 RX ORDER — ONDANSETRON 2 MG/ML
4 INJECTION INTRAMUSCULAR; INTRAVENOUS EVERY 6 HOURS PRN
Status: DISCONTINUED | OUTPATIENT
Start: 2022-03-15 | End: 2022-03-19 | Stop reason: HOSPADM

## 2022-03-15 RX ORDER — DORZOLAMIDE HCL 20 MG/ML
2 SOLUTION/ DROPS OPHTHALMIC DAILY
COMMUNITY

## 2022-03-15 RX ORDER — SODIUM CHLORIDE 0.9 % (FLUSH) 0.9 %
10 SYRINGE (ML) INJECTION EVERY 12 HOURS SCHEDULED
Status: DISCONTINUED | OUTPATIENT
Start: 2022-03-15 | End: 2022-03-19 | Stop reason: HOSPADM

## 2022-03-15 RX ORDER — ACETAMINOPHEN 325 MG/1
650 TABLET ORAL EVERY 6 HOURS PRN
Status: DISCONTINUED | OUTPATIENT
Start: 2022-03-15 | End: 2022-03-19 | Stop reason: HOSPADM

## 2022-03-15 RX ORDER — MAGNESIUM SULFATE IN WATER 40 MG/ML
2000 INJECTION, SOLUTION INTRAVENOUS PRN
Status: DISCONTINUED | OUTPATIENT
Start: 2022-03-15 | End: 2022-03-19 | Stop reason: HOSPADM

## 2022-03-15 RX ORDER — ERGOCALCIFEROL 1.25 MG/1
50000 CAPSULE ORAL WEEKLY
Status: DISCONTINUED | OUTPATIENT
Start: 2022-03-21 | End: 2022-03-19 | Stop reason: HOSPADM

## 2022-03-15 RX ORDER — MEMANTINE HYDROCHLORIDE 10 MG/1
10 TABLET ORAL 2 TIMES DAILY
Status: DISCONTINUED | OUTPATIENT
Start: 2022-03-15 | End: 2022-03-19 | Stop reason: HOSPADM

## 2022-03-15 RX ADMIN — MEMANTINE HYDROCHLORIDE 10 MG: 10 TABLET, FILM COATED ORAL at 21:41

## 2022-03-15 RX ADMIN — SODIUM CHLORIDE, PRESERVATIVE FREE 10 ML: 5 INJECTION INTRAVENOUS at 21:40

## 2022-03-15 RX ADMIN — COLCHICINE 0.6 MG: 0.6 TABLET ORAL at 21:40

## 2022-03-15 RX ADMIN — LATANOPROST 1 DROP: 50 SOLUTION OPHTHALMIC at 21:41

## 2022-03-15 RX ADMIN — TAMSULOSIN HYDROCHLORIDE 0.8 MG: 0.4 CAPSULE ORAL at 21:41

## 2022-03-15 RX ADMIN — SODIUM CHLORIDE: 9 INJECTION, SOLUTION INTRAVENOUS at 21:41

## 2022-03-15 RX ADMIN — HYDROCODONE BITARTRATE AND ACETAMINOPHEN 1 TABLET: 5; 325 TABLET ORAL at 22:27

## 2022-03-15 RX ADMIN — DORZOLAMIDE HYDROCHLORIDE 2 DROP: 20 SOLUTION/ DROPS OPHTHALMIC at 21:41

## 2022-03-15 RX ADMIN — MIRTAZAPINE 7.5 MG: 15 TABLET, FILM COATED ORAL at 21:41

## 2022-03-15 ASSESSMENT — PAIN SCALES - GENERAL
PAINLEVEL_OUTOF10: 2
PAINLEVEL_OUTOF10: 6

## 2022-03-15 ASSESSMENT — PAIN DESCRIPTION - LOCATION: LOCATION: HIP

## 2022-03-15 ASSESSMENT — PAIN DESCRIPTION - ORIENTATION: ORIENTATION: LEFT

## 2022-03-15 ASSESSMENT — PAIN DESCRIPTION - DESCRIPTORS: DESCRIPTORS: ACHING;CONSTANT;DISCOMFORT

## 2022-03-15 ASSESSMENT — PAIN DESCRIPTION - FREQUENCY: FREQUENCY: CONTINUOUS

## 2022-03-15 ASSESSMENT — PAIN DESCRIPTION - ONSET: ONSET: GRADUAL

## 2022-03-15 ASSESSMENT — PAIN DESCRIPTION - PAIN TYPE: TYPE: ACUTE PAIN

## 2022-03-15 NOTE — LETTER
[place] Department Medicaid  CERTIFICATION OF NECESSITY  FOR NON-EMERGENCY TRANSPORTATION   BY GROUND AMBULANCE      Individual Information   1. Name: Cedric Ge 2. [place] Medicaid Billing Number:    3. Address: [address] [place] [address]      Transportation Provider Information   4. Provider Name:    5. [place] Medicaid Provider Number:  National Provider Identifier (NPI):      Certification  7. Criteria:  During transport, this individual requires:  [] Medical treatment or continuous     supervision by an EMT. [] The administration or regulation of oxygen by another person. [] Supervised protective restraint. 8. Period Beginning Date: 03/17/22   9. Length  [] Not more than 10 day(s)  [] One Year     Additional Information Relevant to Certification   10. Comments or Explanations, If Necessary or Appropriate     Oriented x 1, dementia, femur fx, NWB LLE with hinged knee brace     Certifying Practitioner Information   11. Name of Practitioner: Howie Srivastava MD   12. [place] Medicaid Provider Number, If Applicable:  [address] Provider Identifier (NPI):      Signature Information   14. Date of Signature: 03/17/22 15. Name of Person Signing: [facility]   35. Signature and Professional Designation: Electronically signed by [facility] G[facility] on 3/17/2022 at 11:33 AM  Discharge planner     [facility] [id number]  Rev. 7/2015    [address] [place] [address] Encounter Date/Time: 3/15/2022 [address] [place] [address] Account: [de-identified]    MRN: [id number]    Patient: Cedric Ge    Contact Serial #: [id number]      ENCOUNTER          Patient Class: I Private Enc? No Unit  BD: [facility] 4S PICU 4502/4502-A   Hospital Service:  INM   Encounter DX: Other fracture of left f*   ADM Provider: Louis Villanueva DO   Procedure:     ATT Provider: Amberly Hitchcock MD   REF Provider: Kacy Kapoor      Admission DX: Other fracture of left femur, initial encounter for closed fracture [facility]) and DX codes: W68.0I8K    PATIENT                 Name: Lidia Costello : 1934 (80 yrs)   Address: [address] [place] [address] Sex: Male   City: [place] [address]         Marital Status:    Employer: RETIRED         Muslim: Restorationism   Primary Care Provider: Pema Radford DO         Primary Phone: [phone]   EMERGENCY CONTACT   Contact Name Legal Guardian? Relationship to Patient Home Phone Work Phone   1. Moses Cantor  2. Rogers Crockett No  No Child  Child [phone]            GUARANTOR            Guarantor: Lidia Costello     : 1934   Address: [address] [place] [address] Sex: Male     [place] [address]     Relation to Patient: Self       Home Phone: [phone]   Guarantor ID: [id number]       Work Phone: [phone]   Guarantor Employer: RETIRED         Status: RETIRED      COVERAGE        PRIMARY INSURANCE   Payor: [facility] MEDICARE Plan: [facility] MEDICARE COMPLETE   Payor Address: ,          Group Number: [facility] Insurance Type: INDEMNITY   Subscriber Name: Yamilka Castano : 1934   Subscriber ID: [id number] Pat. Rel. to Sub: Self   SECONDARY INSURANCE   Payor:   Plan:     Payor Address:  ,           Group Number:   Insurance Type:     Subscriber Name:   Subscriber :     Subscriber ID:   Pat.  Rel. to Sub:             CSN: [id number]

## 2022-03-16 ENCOUNTER — APPOINTMENT (OUTPATIENT)
Dept: GENERAL RADIOLOGY | Age: 87
DRG: 481 | End: 2022-03-16
Attending: INTERNAL MEDICINE
Payer: MEDICARE

## 2022-03-16 ENCOUNTER — ANESTHESIA EVENT (OUTPATIENT)
Dept: OPERATING ROOM | Age: 87
DRG: 481 | End: 2022-03-16
Payer: MEDICARE

## 2022-03-16 ENCOUNTER — ANESTHESIA (OUTPATIENT)
Dept: OPERATING ROOM | Age: 87
DRG: 481 | End: 2022-03-16
Payer: MEDICARE

## 2022-03-16 VITALS — SYSTOLIC BLOOD PRESSURE: 88 MMHG | OXYGEN SATURATION: 95 % | DIASTOLIC BLOOD PRESSURE: 71 MMHG | TEMPERATURE: 95.7 F

## 2022-03-16 PROBLEM — I95.9 HYPOTENSION: Status: ACTIVE | Noted: 2020-08-12

## 2022-03-16 LAB
ANION GAP SERPL CALCULATED.3IONS-SCNC: 9 MMOL/L (ref 7–16)
BASOPHILS ABSOLUTE: 0.03 E9/L (ref 0–0.2)
BASOPHILS RELATIVE PERCENT: 0.4 % (ref 0–2)
BUN BLDV-MCNC: 23 MG/DL (ref 6–23)
CALCIUM SERPL-MCNC: 8 MG/DL (ref 8.6–10.2)
CHLORIDE BLD-SCNC: 110 MMOL/L (ref 98–107)
CO2: 26 MMOL/L (ref 22–29)
CREAT SERPL-MCNC: 0.7 MG/DL (ref 0.7–1.2)
EKG ATRIAL RATE: 70 BPM
EKG P AXIS: 21 DEGREES
EKG P-R INTERVAL: 144 MS
EKG Q-T INTERVAL: 442 MS
EKG QRS DURATION: 136 MS
EKG QTC CALCULATION (BAZETT): 477 MS
EKG R AXIS: -52 DEGREES
EKG T AXIS: 3 DEGREES
EKG VENTRICULAR RATE: 70 BPM
EOSINOPHILS ABSOLUTE: 0.39 E9/L (ref 0.05–0.5)
EOSINOPHILS RELATIVE PERCENT: 5 % (ref 0–6)
FERRITIN: 678 NG/ML
GFR AFRICAN AMERICAN: >60
GFR NON-AFRICAN AMERICAN: >60 ML/MIN/1.73
GLUCOSE BLD-MCNC: 58 MG/DL (ref 74–99)
HCT VFR BLD CALC: 24.8 % (ref 37–54)
HCT VFR BLD CALC: 25.3 % (ref 37–54)
HEMOGLOBIN: 7.6 G/DL (ref 12.5–16.5)
HEMOGLOBIN: 8.1 G/DL (ref 12.5–16.5)
IMMATURE GRANULOCYTES #: 0.06 E9/L
IMMATURE GRANULOCYTES %: 0.8 % (ref 0–5)
IRON SATURATION: 27 % (ref 20–55)
IRON: 40 MCG/DL (ref 59–158)
LYMPHOCYTES ABSOLUTE: 2.42 E9/L (ref 1.5–4)
LYMPHOCYTES RELATIVE PERCENT: 31 % (ref 20–42)
MAGNESIUM: 2.2 MG/DL (ref 1.6–2.6)
MCH RBC QN AUTO: 30.4 PG (ref 26–35)
MCHC RBC AUTO-ENTMCNC: 30.6 % (ref 32–34.5)
MCV RBC AUTO: 99.2 FL (ref 80–99.9)
METER GLUCOSE: 111 MG/DL (ref 74–99)
METER GLUCOSE: 132 MG/DL (ref 74–99)
METER GLUCOSE: 92 MG/DL (ref 74–99)
METER GLUCOSE: 99 MG/DL (ref 74–99)
METER GLUCOSE: <40 MG/DL (ref 74–99)
MONOCYTES ABSOLUTE: 0.87 E9/L (ref 0.1–0.95)
MONOCYTES RELATIVE PERCENT: 11.1 % (ref 2–12)
NEUTROPHILS ABSOLUTE: 4.04 E9/L (ref 1.8–7.3)
NEUTROPHILS RELATIVE PERCENT: 51.7 % (ref 43–80)
PDW BLD-RTO: 15.9 FL (ref 11.5–15)
PLATELET # BLD: 261 E9/L (ref 130–450)
PMV BLD AUTO: 11.4 FL (ref 7–12)
POTASSIUM REFLEX MAGNESIUM: 3.3 MMOL/L (ref 3.5–5)
RBC # BLD: 2.5 E12/L (ref 3.8–5.8)
SODIUM BLD-SCNC: 145 MMOL/L (ref 132–146)
TOTAL IRON BINDING CAPACITY: 149 MCG/DL (ref 250–450)
WBC # BLD: 7.8 E9/L (ref 4.5–11.5)

## 2022-03-16 PROCEDURE — 3700000000 HC ANESTHESIA ATTENDED CARE: Performed by: ORTHOPAEDIC SURGERY

## 2022-03-16 PROCEDURE — 3209999900 FLUORO FOR SURGICAL PROCEDURES

## 2022-03-16 PROCEDURE — 2500000003 HC RX 250 WO HCPCS

## 2022-03-16 PROCEDURE — 71045 X-RAY EXAM CHEST 1 VIEW: CPT

## 2022-03-16 PROCEDURE — 7100000001 HC PACU RECOVERY - ADDTL 15 MIN: Performed by: ORTHOPAEDIC SURGERY

## 2022-03-16 PROCEDURE — 83550 IRON BINDING TEST: CPT

## 2022-03-16 PROCEDURE — 2709999900 HC NON-CHARGEABLE SUPPLY: Performed by: ORTHOPAEDIC SURGERY

## 2022-03-16 PROCEDURE — 6370000000 HC RX 637 (ALT 250 FOR IP): Performed by: FAMILY MEDICINE

## 2022-03-16 PROCEDURE — 85014 HEMATOCRIT: CPT

## 2022-03-16 PROCEDURE — P9041 ALBUMIN (HUMAN),5%, 50ML: HCPCS

## 2022-03-16 PROCEDURE — 80048 BASIC METABOLIC PNL TOTAL CA: CPT

## 2022-03-16 PROCEDURE — 7100000000 HC PACU RECOVERY - FIRST 15 MIN: Performed by: ORTHOPAEDIC SURGERY

## 2022-03-16 PROCEDURE — 83735 ASSAY OF MAGNESIUM: CPT

## 2022-03-16 PROCEDURE — 2580000003 HC RX 258

## 2022-03-16 PROCEDURE — 3700000001 HC ADD 15 MINUTES (ANESTHESIA): Performed by: ORTHOPAEDIC SURGERY

## 2022-03-16 PROCEDURE — 85018 HEMOGLOBIN: CPT

## 2022-03-16 PROCEDURE — 3600000015 HC SURGERY LEVEL 5 ADDTL 15MIN: Performed by: ORTHOPAEDIC SURGERY

## 2022-03-16 PROCEDURE — 6360000002 HC RX W HCPCS: Performed by: STUDENT IN AN ORGANIZED HEALTH CARE EDUCATION/TRAINING PROGRAM

## 2022-03-16 PROCEDURE — C1769 GUIDE WIRE: HCPCS | Performed by: ORTHOPAEDIC SURGERY

## 2022-03-16 PROCEDURE — 27507 TREATMENT OF THIGH FRACTURE: CPT | Performed by: ORTHOPAEDIC SURGERY

## 2022-03-16 PROCEDURE — C1776 JOINT DEVICE (IMPLANTABLE): HCPCS | Performed by: ORTHOPAEDIC SURGERY

## 2022-03-16 PROCEDURE — 6360000002 HC RX W HCPCS: Performed by: ORTHOPAEDIC SURGERY

## 2022-03-16 PROCEDURE — 6360000002 HC RX W HCPCS: Performed by: ANESTHESIOLOGY

## 2022-03-16 PROCEDURE — P9016 RBC LEUKOCYTES REDUCED: HCPCS

## 2022-03-16 PROCEDURE — 83540 ASSAY OF IRON: CPT

## 2022-03-16 PROCEDURE — 82728 ASSAY OF FERRITIN: CPT

## 2022-03-16 PROCEDURE — 64447 NJX AA&/STRD FEMORAL NRV IMG: CPT | Performed by: ANESTHESIOLOGY

## 2022-03-16 PROCEDURE — 2060000000 HC ICU INTERMEDIATE R&B

## 2022-03-16 PROCEDURE — 6360000002 HC RX W HCPCS

## 2022-03-16 PROCEDURE — C1713 ANCHOR/SCREW BN/BN,TIS/BN: HCPCS | Performed by: ORTHOPAEDIC SURGERY

## 2022-03-16 PROCEDURE — 6370000000 HC RX 637 (ALT 250 FOR IP): Performed by: INTERNAL MEDICINE

## 2022-03-16 PROCEDURE — 85025 COMPLETE CBC W/AUTO DIFF WBC: CPT

## 2022-03-16 PROCEDURE — L3650 SO 8 ABD RESTRAINT PRE OTS: HCPCS | Performed by: ORTHOPAEDIC SURGERY

## 2022-03-16 PROCEDURE — 2720000010 HC SURG SUPPLY STERILE: Performed by: ORTHOPAEDIC SURGERY

## 2022-03-16 PROCEDURE — 93010 ELECTROCARDIOGRAM REPORT: CPT | Performed by: INTERNAL MEDICINE

## 2022-03-16 PROCEDURE — 2580000003 HC RX 258: Performed by: FAMILY MEDICINE

## 2022-03-16 PROCEDURE — 82962 GLUCOSE BLOOD TEST: CPT

## 2022-03-16 PROCEDURE — 6370000000 HC RX 637 (ALT 250 FOR IP): Performed by: STUDENT IN AN ORGANIZED HEALTH CARE EDUCATION/TRAINING PROGRAM

## 2022-03-16 PROCEDURE — 3600000005 HC SURGERY LEVEL 5 BASE: Performed by: ORTHOPAEDIC SURGERY

## 2022-03-16 PROCEDURE — 0QSC04Z REPOSITION LEFT LOWER FEMUR WITH INTERNAL FIXATION DEVICE, OPEN APPROACH: ICD-10-PCS | Performed by: ORTHOPAEDIC SURGERY

## 2022-03-16 PROCEDURE — 73552 X-RAY EXAM OF FEMUR 2/>: CPT

## 2022-03-16 PROCEDURE — 2580000003 HC RX 258: Performed by: STUDENT IN AN ORGANIZED HEALTH CARE EDUCATION/TRAINING PROGRAM

## 2022-03-16 PROCEDURE — 36415 COLL VENOUS BLD VENIPUNCTURE: CPT

## 2022-03-16 DEVICE — SCREW BNE L40MM DIA4.5MM PROX CORT TIB S STL ST LOK FULL: Type: IMPLANTABLE DEVICE | Site: FEMUR | Status: FUNCTIONAL

## 2022-03-16 DEVICE — SCREW BNE L42MM DIA4.5MM PROX CORT TIB S STL ST LOK FULL: Type: IMPLANTABLE DEVICE | Site: FEMUR | Status: FUNCTIONAL

## 2022-03-16 DEVICE — SCREW BNE L75MM DIA5MM CNDYL S STL ST VAR ANG LOK T25: Type: IMPLANTABLE DEVICE | Site: FEMUR | Status: FUNCTIONAL

## 2022-03-16 DEVICE — SCREW BNE L65MM DIA5MM CNDYL S STL ST VAR ANG LOK T25: Type: IMPLANTABLE DEVICE | Site: FEMUR | Status: FUNCTIONAL

## 2022-03-16 DEVICE — SCREW BNE L34MM DIA4.5MM PROX CORT TIB S STL ST LOK FULL: Type: IMPLANTABLE DEVICE | Site: FEMUR | Status: FUNCTIONAL

## 2022-03-16 DEVICE — SCREW BNE L30MM DIA5MM CNDYL S STL ST VAR ANG LOK FULL THRD: Type: IMPLANTABLE DEVICE | Site: FEMUR | Status: FUNCTIONAL

## 2022-03-16 DEVICE — IMPLANTABLE DEVICE: Type: IMPLANTABLE DEVICE | Site: FEMUR | Status: FUNCTIONAL

## 2022-03-16 DEVICE — SCREW BNE L38MM DIA4.5MM PROX CORT TIB S STL ST LOK FULL: Type: IMPLANTABLE DEVICE | Site: FEMUR | Status: FUNCTIONAL

## 2022-03-16 DEVICE — 1.7MM CABLE WITH CRIMP 750MM-STERILE: Type: IMPLANTABLE DEVICE | Site: FEMUR | Status: FUNCTIONAL

## 2022-03-16 DEVICE — SCREW BNE L50MM DIA5MM CNDYL S STL ST VAR ANG LOK FULL THRD: Type: IMPLANTABLE DEVICE | Site: FEMUR | Status: FUNCTIONAL

## 2022-03-16 DEVICE — SCREW BNE L64MM DIA4.5MM PROX CORT TIB S STL ST LOK FULL: Type: IMPLANTABLE DEVICE | Site: FEMUR | Status: FUNCTIONAL

## 2022-03-16 RX ORDER — PHENYLEPHRINE HCL IN 0.9% NACL 1 MG/10 ML
SYRINGE (ML) INTRAVENOUS PRN
Status: DISCONTINUED | OUTPATIENT
Start: 2022-03-16 | End: 2022-03-16 | Stop reason: SDUPTHER

## 2022-03-16 RX ORDER — MIDAZOLAM HYDROCHLORIDE 2 MG/2ML
2 INJECTION, SOLUTION INTRAMUSCULAR; INTRAVENOUS ONCE
Status: DISCONTINUED | OUTPATIENT
Start: 2022-03-16 | End: 2022-03-17

## 2022-03-16 RX ORDER — LIDOCAINE HYDROCHLORIDE 20 MG/ML
INJECTION, SOLUTION INTRAVENOUS PRN
Status: DISCONTINUED | OUTPATIENT
Start: 2022-03-16 | End: 2022-03-16 | Stop reason: SDUPTHER

## 2022-03-16 RX ORDER — GLYCOPYRROLATE 1 MG/5 ML
SYRINGE (ML) INTRAVENOUS PRN
Status: DISCONTINUED | OUTPATIENT
Start: 2022-03-16 | End: 2022-03-16 | Stop reason: SDUPTHER

## 2022-03-16 RX ORDER — DEXAMETHASONE SODIUM PHOSPHATE 10 MG/ML
INJECTION INTRAMUSCULAR; INTRAVENOUS PRN
Status: DISCONTINUED | OUTPATIENT
Start: 2022-03-16 | End: 2022-03-16 | Stop reason: SDUPTHER

## 2022-03-16 RX ORDER — ROCURONIUM BROMIDE 10 MG/ML
INJECTION, SOLUTION INTRAVENOUS PRN
Status: DISCONTINUED | OUTPATIENT
Start: 2022-03-16 | End: 2022-03-16 | Stop reason: SDUPTHER

## 2022-03-16 RX ORDER — VANCOMYCIN HYDROCHLORIDE 1 G/20ML
INJECTION, POWDER, LYOPHILIZED, FOR SOLUTION INTRAVENOUS PRN
Status: DISCONTINUED | OUTPATIENT
Start: 2022-03-16 | End: 2022-03-16 | Stop reason: ALTCHOICE

## 2022-03-16 RX ORDER — SODIUM CHLORIDE 0.9 % (FLUSH) 0.9 %
5-40 SYRINGE (ML) INJECTION EVERY 12 HOURS SCHEDULED
Status: DISCONTINUED | OUTPATIENT
Start: 2022-03-16 | End: 2022-03-16 | Stop reason: HOSPADM

## 2022-03-16 RX ORDER — SODIUM CHLORIDE 9 MG/ML
INJECTION, SOLUTION INTRAVENOUS PRN
Status: DISCONTINUED | OUTPATIENT
Start: 2022-03-16 | End: 2022-03-17

## 2022-03-16 RX ORDER — NEOSTIGMINE METHYLSULFATE 1 MG/ML
INJECTION, SOLUTION INTRAVENOUS PRN
Status: DISCONTINUED | OUTPATIENT
Start: 2022-03-16 | End: 2022-03-16 | Stop reason: SDUPTHER

## 2022-03-16 RX ORDER — ASPIRIN 325 MG
325 TABLET, DELAYED RELEASE (ENTERIC COATED) ORAL 2 TIMES DAILY
Qty: 56 TABLET | Refills: 0 | Status: SHIPPED | OUTPATIENT
Start: 2022-03-16 | End: 2022-06-16

## 2022-03-16 RX ORDER — SODIUM CHLORIDE 9 MG/ML
25 INJECTION, SOLUTION INTRAVENOUS PRN
Status: DISCONTINUED | OUTPATIENT
Start: 2022-03-16 | End: 2022-03-16 | Stop reason: HOSPADM

## 2022-03-16 RX ORDER — ROPIVACAINE HYDROCHLORIDE 5 MG/ML
25 INJECTION, SOLUTION EPIDURAL; INFILTRATION; PERINEURAL ONCE
Status: COMPLETED | OUTPATIENT
Start: 2022-03-16 | End: 2022-03-16

## 2022-03-16 RX ORDER — ROPIVACAINE HYDROCHLORIDE 5 MG/ML
INJECTION, SOLUTION EPIDURAL; INFILTRATION; PERINEURAL
Status: COMPLETED | OUTPATIENT
Start: 2022-03-16 | End: 2022-03-16

## 2022-03-16 RX ORDER — TAMSULOSIN HYDROCHLORIDE 0.4 MG/1
0.4 CAPSULE ORAL DAILY
Status: DISCONTINUED | OUTPATIENT
Start: 2022-03-17 | End: 2022-03-19 | Stop reason: HOSPADM

## 2022-03-16 RX ORDER — OXYCODONE HYDROCHLORIDE AND ACETAMINOPHEN 5; 325 MG/1; MG/1
1 TABLET ORAL EVERY 6 HOURS PRN
Qty: 28 TABLET | Refills: 0 | Status: SHIPPED | OUTPATIENT
Start: 2022-03-16 | End: 2022-03-23

## 2022-03-16 RX ORDER — ONDANSETRON 2 MG/ML
INJECTION INTRAMUSCULAR; INTRAVENOUS PRN
Status: DISCONTINUED | OUTPATIENT
Start: 2022-03-16 | End: 2022-03-16 | Stop reason: SDUPTHER

## 2022-03-16 RX ORDER — FENTANYL CITRATE 50 UG/ML
INJECTION, SOLUTION INTRAMUSCULAR; INTRAVENOUS PRN
Status: DISCONTINUED | OUTPATIENT
Start: 2022-03-16 | End: 2022-03-16 | Stop reason: SDUPTHER

## 2022-03-16 RX ORDER — DEXTROSE MONOHYDRATE 25 G/50ML
INJECTION, SOLUTION INTRAVENOUS PRN
Status: DISCONTINUED | OUTPATIENT
Start: 2022-03-16 | End: 2022-03-16 | Stop reason: SDUPTHER

## 2022-03-16 RX ORDER — SODIUM CHLORIDE 0.9 % (FLUSH) 0.9 %
5-40 SYRINGE (ML) INJECTION PRN
Status: DISCONTINUED | OUTPATIENT
Start: 2022-03-16 | End: 2022-03-16 | Stop reason: HOSPADM

## 2022-03-16 RX ORDER — MIDODRINE HYDROCHLORIDE 2.5 MG/1
5 TABLET ORAL
Status: DISCONTINUED | OUTPATIENT
Start: 2022-03-16 | End: 2022-03-17

## 2022-03-16 RX ORDER — ONDANSETRON 2 MG/ML
4 INJECTION INTRAMUSCULAR; INTRAVENOUS
Status: DISCONTINUED | OUTPATIENT
Start: 2022-03-16 | End: 2022-03-16 | Stop reason: HOSPADM

## 2022-03-16 RX ORDER — SODIUM CHLORIDE 9 MG/ML
INJECTION, SOLUTION INTRAVENOUS CONTINUOUS PRN
Status: DISCONTINUED | OUTPATIENT
Start: 2022-03-16 | End: 2022-03-16 | Stop reason: SDUPTHER

## 2022-03-16 RX ORDER — PROPOFOL 10 MG/ML
INJECTION, EMULSION INTRAVENOUS PRN
Status: DISCONTINUED | OUTPATIENT
Start: 2022-03-16 | End: 2022-03-16 | Stop reason: SDUPTHER

## 2022-03-16 RX ORDER — ALBUMIN, HUMAN INJ 5% 5 %
SOLUTION INTRAVENOUS PRN
Status: DISCONTINUED | OUTPATIENT
Start: 2022-03-16 | End: 2022-03-16 | Stop reason: SDUPTHER

## 2022-03-16 RX ADMIN — ROCURONIUM BROMIDE 30 MG: 10 SOLUTION INTRAVENOUS at 11:48

## 2022-03-16 RX ADMIN — TAMSULOSIN HYDROCHLORIDE 0.8 MG: 0.4 CAPSULE ORAL at 09:26

## 2022-03-16 RX ADMIN — Medication 100 MCG: at 12:29

## 2022-03-16 RX ADMIN — COLCHICINE 0.6 MG: 0.6 TABLET ORAL at 16:03

## 2022-03-16 RX ADMIN — MEMANTINE HYDROCHLORIDE 10 MG: 10 TABLET, FILM COATED ORAL at 09:26

## 2022-03-16 RX ADMIN — Medication 2000 MG: at 20:49

## 2022-03-16 RX ADMIN — DEXTROSE MONOHYDRATE 12.5 G: 25 INJECTION, SOLUTION INTRAVENOUS at 10:58

## 2022-03-16 RX ADMIN — Medication 2000 MG: at 12:00

## 2022-03-16 RX ADMIN — FENTANYL CITRATE 25 MCG: 50 INJECTION, SOLUTION INTRAMUSCULAR; INTRAVENOUS at 11:03

## 2022-03-16 RX ADMIN — MIDODRINE HYDROCHLORIDE 5 MG: 2.5 TABLET ORAL at 17:50

## 2022-03-16 RX ADMIN — Medication 100 MCG: at 11:48

## 2022-03-16 RX ADMIN — PROPOFOL 40 MG: 10 INJECTION, EMULSION INTRAVENOUS at 11:48

## 2022-03-16 RX ADMIN — ONDANSETRON 4 MG: 2 INJECTION INTRAMUSCULAR; INTRAVENOUS at 13:14

## 2022-03-16 RX ADMIN — ROPIVACAINE HYDROCHLORIDE 25 ML: 5 INJECTION, SOLUTION EPIDURAL; INFILTRATION; PERINEURAL at 11:08

## 2022-03-16 RX ADMIN — MIRTAZAPINE 7.5 MG: 15 TABLET, FILM COATED ORAL at 20:50

## 2022-03-16 RX ADMIN — LIDOCAINE HYDROCHLORIDE 100 MG: 20 INJECTION, SOLUTION INTRAVENOUS at 11:48

## 2022-03-16 RX ADMIN — DEXTROSE MONOHYDRATE 6.25 G: 25 INJECTION, SOLUTION INTRAVENOUS at 13:21

## 2022-03-16 RX ADMIN — HYDROCODONE BITARTRATE AND ACETAMINOPHEN 1 TABLET: 5; 325 TABLET ORAL at 05:59

## 2022-03-16 RX ADMIN — COLCHICINE 0.6 MG: 0.6 TABLET ORAL at 09:25

## 2022-03-16 RX ADMIN — ALBUMIN (HUMAN) 25 G: 12.5 INJECTION, SOLUTION INTRAVENOUS at 11:50

## 2022-03-16 RX ADMIN — Medication 3 MG: at 13:52

## 2022-03-16 RX ADMIN — ALLOPURINOL 100 MG: 100 TABLET ORAL at 09:26

## 2022-03-16 RX ADMIN — FENTANYL CITRATE 25 MCG: 50 INJECTION, SOLUTION INTRAMUSCULAR; INTRAVENOUS at 11:48

## 2022-03-16 RX ADMIN — ROPIVACAINE HYDROCHLORIDE 25 ML: 5 INJECTION EPIDURAL; INFILTRATION; PERINEURAL at 12:09

## 2022-03-16 RX ADMIN — ACETAMINOPHEN 650 MG: 325 TABLET ORAL at 09:25

## 2022-03-16 RX ADMIN — COLCHICINE 0.6 MG: 0.6 TABLET ORAL at 20:49

## 2022-03-16 RX ADMIN — SODIUM CHLORIDE, PRESERVATIVE FREE 10 ML: 5 INJECTION INTRAVENOUS at 20:50

## 2022-03-16 RX ADMIN — DEXAMETHASONE SODIUM PHOSPHATE 10 MG: 10 INJECTION INTRAMUSCULAR; INTRAVENOUS at 12:15

## 2022-03-16 RX ADMIN — SODIUM CHLORIDE 75 ML/HR: 9 INJECTION, SOLUTION INTRAVENOUS at 15:28

## 2022-03-16 RX ADMIN — SODIUM CHLORIDE: 9 INJECTION, SOLUTION INTRAVENOUS at 11:12

## 2022-03-16 RX ADMIN — Medication 100 MCG: at 12:24

## 2022-03-16 RX ADMIN — LATANOPROST 1 DROP: 50 SOLUTION OPHTHALMIC at 20:50

## 2022-03-16 RX ADMIN — SODIUM CHLORIDE, PRESERVATIVE FREE 10 ML: 5 INJECTION INTRAVENOUS at 09:25

## 2022-03-16 RX ADMIN — MEMANTINE HYDROCHLORIDE 10 MG: 10 TABLET, FILM COATED ORAL at 20:50

## 2022-03-16 RX ADMIN — Medication 100 MCG: at 11:54

## 2022-03-16 RX ADMIN — FENTANYL CITRATE 25 MCG: 50 INJECTION, SOLUTION INTRAMUSCULAR; INTRAVENOUS at 13:36

## 2022-03-16 RX ADMIN — Medication 100 MCG: at 12:12

## 2022-03-16 RX ADMIN — ROCURONIUM BROMIDE 10 MG: 10 SOLUTION INTRAVENOUS at 12:26

## 2022-03-16 RX ADMIN — Medication 100 MCG: at 13:21

## 2022-03-16 RX ADMIN — LEVOTHYROXINE SODIUM 125 MCG: 0.07 TABLET ORAL at 05:45

## 2022-03-16 RX ADMIN — Medication 0.6 MG: at 13:52

## 2022-03-16 ASSESSMENT — PULMONARY FUNCTION TESTS
PIF_VALUE: 2
PIF_VALUE: 14
PIF_VALUE: 14
PIF_VALUE: 15
PIF_VALUE: 10
PIF_VALUE: 16
PIF_VALUE: 16
PIF_VALUE: 14
PIF_VALUE: 10
PIF_VALUE: 3
PIF_VALUE: 16
PIF_VALUE: 1
PIF_VALUE: 14
PIF_VALUE: 15
PIF_VALUE: 2
PIF_VALUE: 10
PIF_VALUE: 10
PIF_VALUE: 2
PIF_VALUE: 13
PIF_VALUE: 16
PIF_VALUE: 10
PIF_VALUE: 13
PIF_VALUE: 14
PIF_VALUE: 16
PIF_VALUE: 14
PIF_VALUE: 13
PIF_VALUE: 15
PIF_VALUE: 15
PIF_VALUE: 13
PIF_VALUE: 15
PIF_VALUE: 15
PIF_VALUE: 12
PIF_VALUE: 15
PIF_VALUE: 10
PIF_VALUE: 15
PIF_VALUE: 14
PIF_VALUE: 10
PIF_VALUE: 14
PIF_VALUE: 13
PIF_VALUE: 14
PIF_VALUE: 15
PIF_VALUE: 10
PIF_VALUE: 13
PIF_VALUE: 14
PIF_VALUE: 2
PIF_VALUE: 13
PIF_VALUE: 14
PIF_VALUE: 16
PIF_VALUE: 14
PIF_VALUE: 13
PIF_VALUE: 14
PIF_VALUE: 14
PIF_VALUE: 1
PIF_VALUE: 13
PIF_VALUE: 13
PIF_VALUE: 2
PIF_VALUE: 14
PIF_VALUE: 15
PIF_VALUE: 16
PIF_VALUE: 10
PIF_VALUE: 14
PIF_VALUE: 14
PIF_VALUE: 17
PIF_VALUE: 2
PIF_VALUE: 12
PIF_VALUE: 0
PIF_VALUE: 13
PIF_VALUE: 14
PIF_VALUE: 12
PIF_VALUE: 1
PIF_VALUE: 14
PIF_VALUE: 2
PIF_VALUE: 2
PIF_VALUE: 16
PIF_VALUE: 15
PIF_VALUE: 2
PIF_VALUE: 0
PIF_VALUE: 14
PIF_VALUE: 2
PIF_VALUE: 15
PIF_VALUE: 15
PIF_VALUE: 2
PIF_VALUE: 1
PIF_VALUE: 1
PIF_VALUE: 14
PIF_VALUE: 10
PIF_VALUE: 15
PIF_VALUE: 14
PIF_VALUE: 3
PIF_VALUE: 2
PIF_VALUE: 2
PIF_VALUE: 13
PIF_VALUE: 13
PIF_VALUE: 14
PIF_VALUE: 2
PIF_VALUE: 15
PIF_VALUE: 14
PIF_VALUE: 2
PIF_VALUE: 14
PIF_VALUE: 2
PIF_VALUE: 1
PIF_VALUE: 3
PIF_VALUE: 1
PIF_VALUE: 2
PIF_VALUE: 15
PIF_VALUE: 14
PIF_VALUE: 11
PIF_VALUE: 2
PIF_VALUE: 15
PIF_VALUE: 2
PIF_VALUE: 1
PIF_VALUE: 16
PIF_VALUE: 1
PIF_VALUE: 10
PIF_VALUE: 15
PIF_VALUE: 14
PIF_VALUE: 14
PIF_VALUE: 22
PIF_VALUE: 10
PIF_VALUE: 23
PIF_VALUE: 13
PIF_VALUE: 14
PIF_VALUE: 15
PIF_VALUE: 12
PIF_VALUE: 2
PIF_VALUE: 2
PIF_VALUE: 15
PIF_VALUE: 10
PIF_VALUE: 12

## 2022-03-16 ASSESSMENT — PAIN DESCRIPTION - DESCRIPTORS: DESCRIPTORS: ACHING;DISCOMFORT;SHARP

## 2022-03-16 ASSESSMENT — PAIN SCALES - GENERAL
PAINLEVEL_OUTOF10: 0
PAINLEVEL_OUTOF10: 8
PAINLEVEL_OUTOF10: 0
PAINLEVEL_OUTOF10: 0
PAINLEVEL_OUTOF10: 2
PAINLEVEL_OUTOF10: 0
PAINLEVEL_OUTOF10: 2
PAINLEVEL_OUTOF10: 0

## 2022-03-16 ASSESSMENT — PAIN DESCRIPTION - PAIN TYPE: TYPE: ACUTE PAIN

## 2022-03-16 ASSESSMENT — LIFESTYLE VARIABLES: SMOKING_STATUS: 0

## 2022-03-16 ASSESSMENT — PAIN DESCRIPTION - LOCATION
LOCATION: HIP;LEG
LOCATION: HIP;LEG

## 2022-03-16 ASSESSMENT — PAIN DESCRIPTION - ORIENTATION
ORIENTATION: LEFT
ORIENTATION: LEFT

## 2022-03-16 ASSESSMENT — PAIN DESCRIPTION - FREQUENCY: FREQUENCY: INTERMITTENT

## 2022-03-16 NOTE — ANESTHESIA PRE PROCEDURE
Department of Anesthesiology  Preprocedure Note       Name:  Lidia Costello   Age:  80 y.o.  :  1934                                          MRN:  [id number]         Date:  3/16/2022      Surgeon: Fina Slater):  Mirela Kim DO    Procedure: Procedure(s): FEMUR OPEN REDUCTION INTERNAL FIXATION SYNTHES DISTAL FEMORAL PLATE    Medications prior to admission:   Prior to Admission medications    Medication Sig Start Date End Date Taking? Authorizing Provider   allopurinol (ZYLOPRIM) 100 MG tablet Take 100 mg by mouth daily   Yes Historical Provider, MD   bimatoprost (LUMIGAN) 0.01 % SOLN ophthalmic drops Place 1 drop into both eyes nightly   Yes Historical Provider, MD   Cholecalciferol (VITAMIN D) 10 MCG (400 UNIT) CAPS Capsule Take 50,000 Units by mouth daily   Yes Historical Provider, MD   colchicine (COLCRYS) 0.6 MG tablet Take 0.6 mg by mouth 3 times daily   Yes Historical Provider, MD   dorzolamide (TRUSOPT) 2 % ophthalmic solution Place 2 drops into both eyes daily   Yes Historical Provider, MD   levothyroxine (SYNTHROID) 125 MCG tablet Take 125 mcg by mouth Daily   Yes Historical Provider, MD   memantine ER (NAMENDA XR) 28 MG CP24 extended release capsule Take 28 mg by mouth daily   Yes Historical Provider, MD   tamsulosin (FLOMAX) 0.4 MG capsule Take 0.8 mg by mouth daily   Yes Historical Provider, MD   mirtazapine (REMERON) 15 MG tablet Take 7.5 mg by mouth nightly   Yes Historical Provider, MD   Menthol, Topical Analgesic, (BIOFREEZE) 4 % GEL Apply topically twice daily as needed for pain.  10/1/20   CHARITY Anderson - CNP   acetaminophen (TYLENOL) 650 MG extended release tablet Take 2 tablets by mouth daily Every morning 20  Reginaldo Yin DO       Current medications:    Current Facility-Administered Medications   Medication Dose Route Frequency Provider Last Rate Last Admin    allopurinol (ZYLOPRIM) tablet 100 mg  100 mg Oral Daily Amanda Haskins DO   100 mg at 22 0008    latanoprost (XALATAN) 0.005 % ophthalmic solution 1 drop  1 drop Both Eyes Nightly Zannie Paguate, DO   1 drop at 03/15/22 2141    colchicine (COLCRYS) tablet 0.6 mg  0.6 mg Oral TID Zannie Alto, DO   0.6 mg at 03/16/22 0925    dorzolamide (TRUSOPT) 2 % ophthalmic solution 2 drop  2 drop Both Eyes Daily Zannie Alto, DO   2 drop at 03/15/22 2141    levothyroxine (SYNTHROID) tablet 125 mcg  125 mcg Oral Daily Zannie Alto, DO   125 mcg at 03/16/22 0545    memantine (NAMENDA) tablet 10 mg  10 mg Oral BID Zannie Alto, DO   10 mg at 03/16/22 8255    mirtazapine (REMERON) tablet 7.5 mg  7.5 mg Oral Nightly Zannie Alto, DO   7.5 mg at 03/15/22 2141    tamsulosin (FLOMAX) capsule 0.8 mg  0.8 mg Oral Daily Zannie Alto, DO   0.8 mg at 03/16/22 4089    sodium chloride flush 0.9 % injection 10 mL  10 mL IntraVENous 2 times per day Zannie Alto, DO   10 mL at 03/15/22 2140    sodium chloride flush 0.9 % injection 10 mL  10 mL IntraVENous PRN Zannie Alto, DO        0.9 % sodium chloride infusion  25 mL IntraVENous PRN Zannie Alto, DO        promethazine (PHENERGAN) tablet 12.5 mg  12.5 mg Oral Q6H PRN Zannie Alto, DO        Or    ondansetron [facility]) injection 4 mg  4 mg IntraVENous Q6H PRN Zannie Alto, DO        polyethylene glycol (GLYCOLAX) packet 17 g  17 g Oral Daily PRN Zannie Alto, DO        acetaminophen (TYLENOL) tablet 650 mg  650 mg Oral Q6H PRN Zannie Alto, DO   650 mg at 03/16/22 2958    Or    acetaminophen (TYLENOL) suppository 650 mg  650 mg Rectal Q6H PRN Zannie Alto, DO        0.9 % sodium chloride infusion   IntraVENous Continuous Zannie Alto, DO 75 mL/hr at 03/15/22 2141 New Bag at 03/15/22 2141    potassium chloride (KLOR-CON M) extended release tablet 40 mEq  40 mEq Oral PRN Zannie Alto, DO        Or    potassium bicarb-citric acid (EFFER-K) effervescent tablet 40 mEq  40 mEq Oral PRN Zannie Alto, DO        Or    potassium chloride 10 mEq/100 mL IVPB (Peripheral Line)  10 mEq IntraVENous PRN Ce Fines, DO        magnesium sulfate 2000 mg in 50 mL IVPB premix  2,000 mg IntraVENous PRN Ce Fines, DO        HYDROcodone-acetaminophen [facility]) 5-325 MG per tablet 1 tablet  1 tablet Oral Q6H PRN Ce Fines, DO   1 tablet at 03/16/22 0559    fentaNYL (SUBLIMAZE) injection 25 mcg  25 mcg IntraVENous Q1H PRN Ce Fines, DO        [START ON 3/21/2022] vitamin D (ERGOCALCIFEROL) capsule 50,000 Units  50,000 Units Oral Weekly Ce Fines, DO        ceFAZolin (ANCEF) 2000 mg in sterile water 20 mL IV syringe  2,000 mg IntraVENous On Call to [address], DO           Allergies:  No Known Allergies    Problem List:    Patient Active Problem List   Diagnosis Code    Continuous severe abdominal pain R10.9    Severe lumbar pain M54.50    Constipation K59.00    History of small bowel obstruction Z87.19    History of compression fracture of spine Z87.81    Dementia without behavioral disturbance ([facility] [address] F03.90    Hypothyroidism E03.9    Diverticulitis K57.92    Hypertension I10    Osteoporosis M81.0    Other fracture of left femur, initial encounter for closed fracture ([facility] [address] E28.4G8R       Past Medical History:  History reviewed. No pertinent past medical history. Past Surgical History:  History reviewed. No pertinent surgical history.     Social History:    Social History     Tobacco Use    Smoking status: Not on file    Smokeless tobacco: Not on file   Substance Use Topics    Alcohol use: Not on file                                Counseling given: Not Answered      Vital Signs (Current):   Vitals:    03/15/22 1930 03/16/22 0346 03/16/22 0915   BP: 93/64 (!) 92/57 96/67   Pulse: 83 70 72   Resp: 26 20 16   Temp: 36.6 °C (97.8 °F) 36.7 °C (98 °F) 36.7 °C (98 °F)   TempSrc: Axillary Temporal Oral   SpO2: 95% 94%    Weight: 140 lb (63.5 kg)     Height: 5' 8\" (1.727 m)                                                BP Readings from Last 3 linear atelectasis.  Please note there   are no prior studies available for review. 2. There are no findings of pneumonia. XR left femur 3/15/22  1.  Mid to distal left femur periprosthetic fracture. Yuri Spatz is overlap of   the fracture fragments best seen on cross-table lateral view.       2.  Status post left hip and left knee arthroplasties.       3.  Decreased osseous mineralization.  Atherosclerotic disease. Anesthesia Evaluation  Patient summary reviewed and Nursing notes reviewed no history of anesthetic complications:   Airway: Mallampati: III  TM distance: >3 FB   Neck ROM: full  Mouth opening: < 3 FB Dental:          Pulmonary:Negative Pulmonary ROS breath sounds clear to auscultation      (-) not a current smoker          Patient did not smoke on day of surgery. Cardiovascular:  Exercise tolerance: poor (<4 METS),   (+) hypertension:,       ECG reviewed  Rhythm: regular  Rate: normal           Beta Blocker:  Not on Beta Blocker         Neuro/Psych:   (+) psychiatric history:             ROS comment: Severe lumbar pain  Dementia without behavioral disturbance GI/Hepatic/Renal:            ROS comment: History of small bowel obstruction  Continuous severe abdominal pain. Endo/Other:    (+) hypothyroidism, blood dyscrasia (H&H 7.6/24.8): anemia:., electrolyte abnormalities ( K 3.3), . ROS comment: fracture of left femur  Osteoporosis Abdominal:             Vascular: negative vascular ROS. Other Findings:           Anesthesia Plan      general     ASA 3     (20g LFA)  Induction: intravenous. Anesthetic plan and risks discussed with child/children. Use of blood products discussed with child/children whom consented to blood products. Plan discussed with CRNA and attending.                 Chet Fallon RN   3/16/2022  Chart reviewed Pt id agree with plan CHARITY Woo - CRNA

## 2022-03-16 NOTE — H&P
Hospitalist History & Physical      PCP: Masha Canseco DO    Date of Service: Pt seen/examined on 3/15/2022     Chief Complaint:  had no chief complaint listed for this encounter. History Of Present Illness:    Mr. Demetrius Siddiqui, a 80y.o. year old male  who  has no past medical history on file. Patient is a transfer from [place] emergency department. Imaging revealed a left femur fracture. Patient transferred to [facility] for orthopedics consultation. Patient with a history of dementia is alert and oriented x2. Vital signs within normal with and stable. Laboratory studies demonstrate potassium 3.5, BUN 33, glucose 114, WBC 11.5, hemoglobin 8.5. X-rays reveal an obliquely oriented fracture of the middle and distal third of the left femur extending to just above the knee prosthesis location. 1 shaft with overriding and 4 cm of retraction. History reviewed. No pertinent past medical history. History reviewed. No pertinent surgical history. Prior to Admission medications    Medication Sig Start Date End Date Taking?  Authorizing Provider   allopurinol (ZYLOPRIM) 100 MG tablet Take 100 mg by mouth daily   Yes Historical Provider, MD   bimatoprost (LUMIGAN) 0.01 % SOLN ophthalmic drops Place 1 drop into both eyes nightly   Yes Historical Provider, MD   Cholecalciferol (VITAMIN D) 10 MCG (400 UNIT) CAPS Capsule Take 50,000 Units by mouth daily   Yes Historical Provider, MD   colchicine (COLCRYS) 0.6 MG tablet Take 0.6 mg by mouth 3 times daily   Yes Historical Provider, MD   dorzolamide (TRUSOPT) 2 % ophthalmic solution Place 2 drops into both eyes daily   Yes Historical Provider, MD   levothyroxine (SYNTHROID) 125 MCG tablet Take 125 mcg by mouth Daily   Yes Historical Provider, MD   memantine ER (NAMENDA XR) 28 MG CP24 extended release capsule Take 28 mg by mouth daily   Yes Historical Provider, MD   tamsulosin (FLOMAX) 0.4 MG capsule Take 0.8 mg by mouth daily   Yes Historical Provider, MD   mirtazapine (REMERON) 15 MG tablet Take 7.5 mg by mouth nightly   Yes Historical Provider, MD   Menthol, Topical Analgesic, (BIOFREEZE) 4 % GEL Apply topically twice daily as needed for pain. 10/1/20   Yomi RolandoCHARITY - CNP   acetaminophen (TYLENOL) 650 MG extended release tablet Take 2 tablets by mouth daily Every morning 8/25/20 9/25/20  Nicole Eastlake Weir, DO         Allergies:  Patient has no known allergies. Social History:    TOBACCO:   has no history on file for tobacco use. ETOH:   has no history on file for alcohol use. Family History:    Reviewed in detail and negative for DM, CAD, Cancer, CVA. Positive as follows\"  History reviewed. No pertinent family history. REVIEW OF SYSTEMS:   Pertinent positives as noted in the HPI. All other systems reviewed and negative. PHYSICAL EXAM:  BP 93/64   Pulse 83   Temp 97.8 °F (36.6 °C) (Axillary)   Resp 26   Ht 5' 8\" (1.727 m)   Wt 140 lb (63.5 kg)   SpO2 95%   BMI 21.29 kg/m²   General appearance: No apparent distress  HEENT: Normal cephalic, atraumatic without obvious deformity  Neck: Supple, with full range of motion. No jugular venous distention. Trachea midline. Respiratory: CTA   Cardiovascular: RRR S/NT/ND  Abdomen:    Musculoskeletal: RLE splint  Skin: Normal skin color. No rashes or lesions. Neurologic:  Neurovascularly intact without any focal sensory/motor deficits. Cranial nerves: II-XII intact, grossly non-focal.      CBC:   Recent Labs     03/15/22  0308   WBC 11.5*   RBC 2.72*   HGB 8.5*   HCT 25.4*   MCV 93.5   RDW 16.4*        BMP:   Recent Labs     03/15/22  0308      K 3.5*      CO2 25   BUN 33*   CREATININE 0.8     LFT:  No results for input(s): PROT, ALB, ALKPHOS, ALT, AST, BILITOT, AMYLASE, LIPASE in the last 72 hours. CE:  No results for input(s): Laz Sleeper in the last 72 hours. PT/INR: No results for input(s): INR, APTT in the last 72 hours.   BNP: No results for input(s): BNP in the last 72 hours. ESR: No results found for: SEDRATE  CRP:   Lab Results   Component Value Date    CRP 4.61 (H) 2020     D Dimer: No results found for: DDIMER   Folate and B12:   Lab Results   Component Value Date    CKDSBSIV23 745 10/12/2020   , No results found for: FOLATE  Lactic Acid:   Lab Results   Component Value Date    LACTA 1.8 10/11/2020     Thyroid Studies:   Lab Results   Component Value Date    TSH 0.147 (L) 07/15/2020       Oupatient labs:  Lab Results   Component Value Date    CHOL 156 07/15/2020    TRIG 77 07/15/2020    HDL 40 07/15/2020    TSH 0.147 (L) 07/15/2020    INR 1.1 (L) 10/11/2020       Urinalysis:    Lab Results   Component Value Date    NITRU Negative 10/13/2020    WBCUA 0-2 10/13/2020    BACTERIA TRACE 10/13/2020    RBCUA 0-2 10/11/2020    SPECGRAV 1.025 10/13/2020       Imaging:  XR HIP 2-3 VW W PELVIS LEFT    Result Date: 3/15/2022                                      [address]                                              [facility], [address] [place] [address] Pkw[address]                                              [phone]                                                XRay Report                                                  Signed    Patient: Gildardo Shipley                                                                MR#: [id number]            : 1934                                                                [de-identified]            Age/Sex: 80 / Charolett Deem Date: 03/15/22            Loc: ER                                                                                     Attending Dr:     Gomez Cleveland Physician: Yamilet Villa. DO   Date of Service: 03/15/22  Procedure(s): XR hip LT w pelvis 2 or 3V  Accession Number(s): [id number]    cc: LeJeune,Marty R. DO              HISTORY:  Injury.        TECHNIQUE:  AP and lateral views.       FINDINGS:  There is a obliquely oriented fracture of the middle and distal third of the left femur   extending to just above the knee prosthesis location. One shaft width overriding and 4 cm of   retraction. 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 Non-cemented left knee prosthesis in expected position       Old fracture deformity of the bilateral superior inferior pubic rami. Mild joint space narrowing of the right hip joint. IMPRESSION:   Left hip and femur:   1. Obliquely oriented fracture of the middle and distal third of the left femur extending to just   above the knee prosthesis location. One shaft width overriding and 4 cm of retraction. 2. 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 3. Left knee prosthesis in expected position. Dictated ByGera Rae MD   DD/DT: 03/15/22 0716               Signed By: Yesenia Bernal MD 03/15/22 0829            :      ASSESSMENT:  -Left femur fracture  -Hypokalemia  -Acute on chronic anemia  -Dementia      PLAN:  -Admit to medicine  -Consult orthopedic surgery  -Check Hemoccult  -Monitor serum electrolytes replete as needed  -Pain management  -N.p.o.  -Normal saline 75 mils per hour  -Continue home medications        Diet: No diet orders on file  Code Status: No Order  Surrogate decision maker confirmed with patient:   Extended Emergency Contact Information  Primary Emergency Contact: Moses Cantor  Address: [address] [place] [address] Phone: [phone]  Mobile Phone: [phone]  Relation: Child  Preferred language: English   needed? No  Secondary Emergency Contact: Geena Wheeler, [address] Phone: [phone]  Mobile Phone: [phone]  Relation: Child  Preferred language: English   needed?  No    DVT Prophylaxis: []Lovenox []Heparin []PCD [] [address] []Encouraged ambulation  Disposition: []Med/Surg [] Intermediate [] ICU/CCU  Admit status: [] Observation [] Inpatient     +++++++++++++++++++++++++++++++++++++++++++++++++  Hector Mask, DO  +++++++++++++++++++++++++++++++++++++++++++++++++  NOTE: This report was transcribed using voice recognition software. Every effort was made to ensure accuracy; however, inadvertent computerized transcription errors may be present.

## 2022-03-16 NOTE — OP NOTE
Operative Note      Patient: Kareem Kat  YOB: 1934  MRN: [id number]    Date of Procedure: 3/16/2022    Pre-Op Diagnosis: LEFT FEMUR PERIPROSTHETIC FX    Post-Op Diagnosis: Same       Procedure(s):  LEFT FEMUR PERIPROSTHETIC FRACTURE OPEN REDUCTION INTERNAL FIXATION    Surgeon(s):  Gianluca Mason DO    Assistant:   Resident: Tammi Patel DO; Adán Joshi DO    Anesthesia: General    Estimated Blood Loss (mL): 095     Complications: None    Specimens:   * No specimens in log *    Implants:  Implant Name Type Inv. Item Serial No.  Lot No. LRB No. Used Action   CABLE SURG DIA1. 7MM S STL HA CERCLAGE W/ CRMP [[id number]] [DEPUY SYNTHES [facility]] - [id number]  CABLE SURG DIA1. 7MM S STL HA CERCLAGE W/ CRMP [[id number]] [DEPUY [employer] [facility]]  DEPUY [employer] [place] [id number] Left 1 Implanted   SYNTHES 4.5 THREADED CERCLAGE POSITIONING PIN #[id number]     [id number] Left 1 Implanted   PLATE BNE [id number] 16 H ST L CNDYL S STL CRV MONY COMPR JODY - [id number]  PLATE BNE [id number] 16 H ST L CNDYL S STL CRV MONY COMPR JODY  DEPUY [employer]TH[employer] [place]  Left 1 Implanted   SCREW BNE L38MM DIA4. 5MM PROX TIKA TIB S STL ST MONY FULL - [id number]  SCREW BNE L38MM DIA4. 5MM PROX TIKA TIB S STL ST MONY FULL  DEPUY [employer]THES [place]  Left 3 Implanted   SCREW BNE L40MM DIA4. 5MM PROX TIKA TIB S STL ST MONY FULL - [id number]  SCREW BNE L40MM DIA4. 5MM PROX TIKA TIB S STL ST MONY FULL  DEPUY [employer]  Left 1 Implanted   SCREW BNE L42MM DIA4. 5MM PROX TIKA TIB S STL ST OMNY FULL - [id number]  SCREW BNE L42MM DIA4. 5MM PROX TIKA TIB S STL ST MONY FULL  DEPUY [employer]  Left 1 Implanted   SCREW BNE L64MM DIA4. 5MM PROX TIKA TIB S STL ST MONY FULL - [id number]  SCREW BNE L64MM DIA4. 5MM PROX TIKA TIB S STL ST MONY FULL  DEPUY [employer]  Left 1 Implanted   SCREW BNE L105MM DIA7MM THRD L16MM VICENTE M/DN - [id number]  SCREW BNE L105MM DIA7MM THRD L16MM VICENTE M/DN  [employer]WD  Left 1 Implanted   SCREW BNE L30MM DIA5MM CNDYL S STL ST JODY ANG MONY FULL THRD - [id number]  SCREW BNE L30MM DIA5MM CNDYL S STL ST JODY ANG MONY FULL THRD  DEPUY [employer] [place]  Left 1 Implanted   SCREW BNE L50MM DIA5MM CNDYL S STL ST JODY ANG MONY FULL THRD - [id number]  SCREW BNE L50MM DIA5MM CNDYL S STL ST JODY ANG MONY FULL THRD  DEPUY [employer] [place]  Left 1 Implanted   SCREW BNE L65MM DIA5MM CNDYL S STL ST JODY ANG MONY T25 - [id number]  SCREW BNE L65MM DIA5MM CNDYL S STL ST JODY ANG MONY T25  DEPUY [employer]  Left 1 Implanted   SCREW BNE L75MM DIA5MM CNDYL S STL ST JODY ANG MONY T25 - [id number]  SCREW BNE L75MM DIA5MM CNDYL S STL ST JODY ANG MONY T25  DEPUY [employer]  Left 2 Implanted   SCREW BNE L34MM DIA4. 5MM PROX TIKA TIB S STL ST MONY FULL - [id number]  SCREW BNE L34MM DIA4. 5MM PROX TIKA TIB S STL ST MONY FULL  DEPUY [employer]TH[employer] [place]  Left 2 Implanted         Drains:   External Urinary Catheter (Active)   Urine Color Jeni 03/15/22 2200   Urine Appearance Clear 03/15/22 2200   Output (mL) 200 mL 03/15/22 2200       Detailed Description of Procedure:   Patient brought to the operating suite where he was placed on operative table supine position. He received a general anesthetic by the department esthesia as well as 2 g of Ancef intravenously. All bony prominences were carefully padded. Left lower extremity was sterilely prepped and draped out in standard sterile fashion. Surgical timeout was performed per protocol by members of surgical team.  Leg was placed over radiolucent triangle. Longitudinal incision was made over the distal lateral femur. Skin was incised with the scalpel electrocautery was taken to the subtends tissues. Iliotibial band was divided longitudinally with its fibers. Patient did have significant amount of scar tissue adhesions had multiple surgeries previously about the knee. The vastus lateralis was identified this was carefully reflected and mobilized anteriorly.   The previous TKA component was now visible on the distal segment, this was well fixated with cement fixation. There is a long spiral fracture some interposed quadriceps muscle which was removed from the main fracture line. Hematoma was suction irrigated. The leg was now manipulated with traction and large reduction clamp utilized to probe to reduce the fracture clinically and confirmed fluoroscopically. Proximally there was still some valgus angulation therefore separate lateral incision was made proximally through the skin subtenons tissues and fascia muscle belly was bluntly elevated and is secondary clamp was utilized to reduce this. Next the plate of appropriate length was utilized to bypass both previous arthroplasty components. This was slid submuscular of the lateral distal femur appropriate positioning the plate was confirmed distally and the plate was pinned into position. Proximal clamp was adjusted further compressing the plate to the bone proximally. With the proper reduction and appropriate plate positioning we now fixated the plate proximally using the guide trocar system through small stab incision bicortical screw was placed just about the previous hip arthroplasty stem to the cement mantle. Distally compression screws placed in the distal articular block. Reduction was again confirmed fluoroscopically in multiple views. Additional screws were now placed more proximal with the guide trocar system about the stem and into the intact proximal segment. 2 leg screws then placed through the plate across the long oblique fracture line central shaft region. Additional bicortical screw was placed distally followed by multiple locking screws. A cable was also placed centrally about the fracture through the top portion of the distal incision using a cable passer and carefully passing's about the femoral cortex is also passing outlet on the plate.   This point time he felt appropriate duction stabilization final images were taken saved to Jewel Toned wounds were thoroughly irrigated normal same solution. Standard layered closure was utilized. Wounds are dressed antibiotic ointment Xeroform 4 x 4 fluffs web roll and Ace bandage patient was placed into a knee immobilizer. He is now awoken uneventfully from anesthetic transferred onto the [facility] [place] to the postanesthesia care in stable condition. Postoperative plans:  Patient will be admitted back to medical surgical [facility]. He will receive 24 hours of postop antibiotics. He will be started on chemical DVT prophylaxis postoperative day 1. He will receive physical therapy evaluation and be nonweightbearing affected extremity. Once discharged from the hospital he will follow-up in orthopedic office in 2 weeks for repeat evaluation. Electronically signed by Migule Lockwood DO on 3/16/2022     NOTE: This report was transcribed using voice recognition software.  Every effort was made to ensure accuracy; however, inadvertent computerized transcription errors may be present

## 2022-03-16 NOTE — CONSULTS
Department of Orthopedic Surgery  Resident Consult Note          Reason for Consult:  Left leg pain    HISTORY OF PRESENT ILLNESS:       Patient is a 80 y.o. male who presents with the chief complaint of left leg pain. Patient is a transfer from [facility]. He is a resident of [facility] as [place]. Yesterday he was reported noticed to have swelling of his left leg. He was taken for x-ray and found to have a left femur fracture. There was no fall that was witnessed and they believe the patient got himself back into his chair. Patient is unsure of what happened but states he thinks he fell. He does ambulate short distances to transfer with a cane. He is unsure if he has had a loss in consciousness. 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 He states he has had several falls. Denies numbness/tingling/paresthesias. Denies any other orthopedic complaints at this time. Past Medical History:    History reviewed. No pertinent past medical history. Past Surgical History:    History reviewed. No pertinent surgical history.   Current Medications:   Current Facility-Administered Medications: allopurinol (ZYLOPRIM) tablet 100 mg, 100 mg, Oral, Daily  latanoprost (XALATAN) 0.005 % ophthalmic solution 1 drop, 1 drop, Both Eyes, Nightly  colchicine (COLCRYS) tablet 0.6 mg, 0.6 mg, Oral, TID  dorzolamide (TRUSOPT) 2 % ophthalmic solution 2 drop, 2 drop, Both Eyes, Daily  [START ON 3/16/2022] levothyroxine (SYNTHROID) tablet 125 mcg, 125 mcg, Oral, Daily  memantine (NAMENDA) tablet 10 mg, 10 mg, Oral, BID  mirtazapine (REMERON) tablet 7.5 mg, 7.5 mg, Oral, Nightly  tamsulosin (FLOMAX) capsule 0.8 mg, 0.8 mg, Oral, Daily  sodium chloride flush 0.9 % injection 10 mL, 10 mL, IntraVENous, 2 times per day  sodium chloride flush 0.9 % injection 10 mL, 10 mL, IntraVENous, PRN  0.9 % sodium chloride infusion, 25 mL, IntraVENous, PRN  promethazine (PHENERGAN) tablet 12.5 mg, 12.5 mg, Oral, Q6H PRN **OR** ondansetron (ZOFRAN) injection 4 mg, 4 mg, IntraVENous, Q6H PRN  polyethylene glycol (GLYCOLAX) packet 17 g, 17 g, Oral, Daily PRN  acetaminophen (TYLENOL) tablet 650 mg, 650 mg, Oral, Q6H PRN **OR** acetaminophen (TYLENOL) suppository 650 mg, 650 mg, Rectal, Q6H PRN  0.9 % sodium chloride infusion, , IntraVENous, Continuous  potassium chloride (KLOR-CON M) extended release tablet 40 mEq, 40 mEq, Oral, PRN **OR** potassium bicarb-citric acid (EFFER-K) effervescent tablet 40 mEq, 40 mEq, Oral, PRN **OR** potassium chloride 10 mEq/100 mL IVPB (Peripheral Line), 10 mEq, IntraVENous, PRN  magnesium sulfate 2000 mg in 50 mL IVPB premix, 2,000 mg, IntraVENous, PRN  HYDROcodone-acetaminophen (NORCO) 5-325 MG per tablet 1 tablet, 1 tablet, Oral, Q6H PRN  fentaNYL (SUBLIMAZE) injection 25 mcg, 25 mcg, IntraVENous, Q1H PRN  [START ON 3/21/2022] vitamin D (ERGOCALCIFEROL) capsule 50,000 Units, 50,000 Units, Oral, Weekly  Allergies:  Patient has no known allergies. Social History:   TOBACCO:   has no history on file for tobacco use. ETOH:   has no history on file for alcohol use. DRUGS:   has no history on file for drug use. ACTIVITIES OF DAILY LIVING:    OCCUPATION:    Family History:   History reviewed. No pertinent family history.     REVIEW OF SYSTEMS:  CONSTITUTIONAL:  negative for  fevers, chills  EYES:  negative for blurred vision, visual disturbance  HEENT:  negative for  hearing loss, voice change  RESPIRATORY:  negative for  dyspnea, wheezing  CARDIOVASCULAR:  negative for  chest pain, palpitations  GASTROINTESTINAL:  negative for nausea, vomiting  GENITOURINARY:  negative for frequency, urinary incontinence  HEMATOLOGIC/LYMPHATIC:  negative for bleeding and petechiae  MUSCULOSKELETAL:  positive for left leg pain  NEUROLOGICAL:  negative for headaches, dizziness  BEHAVIOR/PSYCH:  negative for increased agitation and anxiety    PHYSICAL EXAM:    VITALS:  BP 93/64   Pulse 83   Temp 97.8 °F (36.6 °C) (Axillary)   Resp 26   Ht 5' 8\" (1.727 m)   Wt 140 lb (63.5 kg)   SpO2 95%   BMI 21.29 kg/m²   CONSTITUTIONAL: awake, alert, and appears stated age  EYES: Lids and lashes normal, pupils equal, round and reactive to light, extra ocular muscles intact, sclera clear, conjunctiva normal  ENT: Normocephalic, without obvious abnormality, atraumatic, external ears without lesions, oral pharynx with moist mucus membranes  NECK: Trachea midline, skin normal  LUNGS: No increased work of breathing, good air exchange  CARDIOVASCULAR: 2+ pulses throughout and capillary Refill less than 2 seconds  ABDOMEN: soft, non-distended, non-tender and no rebound tenderness or guarding  NEUROLOGIC: Awake, alert  MUSCULOSKELETAL:  Left lower Extremity:   Shortened externally rotated. Skin intact circumferentially. Tenderness to palpation over the mid thigh. Scars consistent with total knee arthroplasty and hemihip arthroplasty well-healed. Nontender palpation about the knee leg foot or ankle   Compartments soft and compressible, calf non-tender   +DP & PT pulses, Brisk Cap refill, Toes warm and perfused   Sensation grossly intact superficial/deep peroneal,saphenous,sural,tibial n. distributions  · +GS/TA/EHL. Secondary Exam:   bilateralUE: No obvious signs of trauma. -TTP to fingers, hand, wrist, forearm, elbow, humerus, shoulder or clavicle. rightLE: No obvious signs of trauma.    -TTP to foot, ankle, leg, knee, thigh, hip    · Pelvis: -TTP, -Log roll, -Heel strike     DATA:    CBC:   Lab Results   Component Value Date    WBC 11.5 03/15/2022    RBC 2.72 03/15/2022    HGB 8.5 03/15/2022    HCT 25.4 03/15/2022    MCV 93.5 03/15/2022    MCH 31.1 03/15/2022    MCHC 33.3 03/15/2022    RDW 16.4 03/15/2022     03/15/2022    MPV 10.2 03/15/2022     PT/INR:    Lab Results   Component Value Date    PROTIME 12.4 03/15/2022    INR 1.1 03/15/2022     CRP/ESR:   Lab Results   Component Value Date    CRP 4.61 08/12/2020     Lactic Acid :   Lab Results   Component Value Date    LACTA 1.8 10/11/2020       Radiology Review:  03/15/22 - XR of the pelvis left hip and femur reviewed demonstrates a long oblique fracture through the midshaft of the left femur. He does have a previous left hemihip arthroplasty that is cemented. He also has a total knee arthroplasty in appropriate position with no signs of loosening or failure. On his pelvic x-rays no fractures of the right femoral neck or proximal femur. IMPRESSION:   · Left periprosthetic femoral shaft fracture    PLAN:   NWB - LLE   After informed consent was verbally obtained, patient was placed into a well-padded posterior long-leg splint.  Patient will need medical assessment for surgical intervention. Appreciate medical care and optimization.  Pain Control PO and IV   Continue ice and elevation to decrease swelling  · Preop labs and imaging  · N.p.o. at midnight  · Treatment consent  · We will plan for surgical intervention on 3/16/2022. · Discuss with Dr. Devi Presume Attending    I have seen and evaluated the patient and agree with the above assessment. I have performed the key components of the history and physical examination and concur completely with the findings as documented. CC: Left thigh pain    HPI: This is a 80 y.o. male who presents with the chief complaint of left leg pain. Patient was a transfer from [place]. He is a resident of [facility] [place]. Yesterday he was reported noticed to have swelling of his left leg. He was taken for x-ray and found to have a left femur fracture. There was no fall that was witnessed and they believe the patient got himself back into his chair. Patient is unsure of what happened but states he thinks he fell. He does ambulate short distances to transfer with a cane. He is unsure if he has had a loss in consciousness.   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 He states he has had several falls. Denies numbness/tingling/paresthesias. Denies any other orthopedic complaints at this time. ROS, medications, allergies, past medical/surgical/social/family histories reviewed and as above    PE:  BP (!) 94/53   Pulse 69   Temp 98 °F (36.7 °C) (Oral)   Resp 14   Ht 5' 8\" (1.727 m)   Wt 140 lb (63.5 kg)   SpO2 99%   BMI 21.29 kg/m²   As above    Radiographic Review:  Left femur periprosthetic fracture, long spiral fractures starting at tip of previous cemented hip arthroplasty extending down to the supracondylar region, fracture displaced, daughter line proximally, osteopenic bone quality. ASSESSMENT:  Left femoral shaft periprosthetic fracture    PLAN:  We had lengthy discussion with patient and family regarding their diagnosis, typical prognosis, and expected outcomes. We reviewed the possible complications from the injury itself despite treatment chosen. We also discussed treatment options including nonoperative managements versus surgical management, along with risks and benefits of each. Patient and family have elected for surgical management despite associated risks. Fracture immobilized with long-leg splint, nonweightbearing, bedrest.  Medical admit with surgical optimization. Plan for OR 3/16/2022 for left femur periprosthetic fracture ORIF    We have explained the risks and complications of the recommended surgery with the patient and family at length, as well as discussed potential treatment alternatives including nonoperative management.  These risks include but are not limited to death or complication from anesthesia, continued pain, nerve tendon or vascular injury, infection, nonunion or malunion, symptomatic hardware or hardware failure, deep vein thrombosis or pulmonary embolism, unforeseen complications, and need for further surgery, etc.  Patient and family understood this, asked appropriate questions, which were all answered, and they have elected to proceed with the procedure. Electronically signed by   Reza Jones DO  3/16/2022    NOTE: This report was transcribed using voice recognition software.  Every effort was made to ensure accuracy; however, inadvertent computerized transcription errors may be present

## 2022-03-16 NOTE — PROGRESS NOTES
Spoke to son, Sirisha Laguna [phone]. Treatment consent and blood product transfusion consent obtained by myself and EDIN RN    Addendum to surgical consent with DNR order and consent for administration of anesthesia obtained by anesthesia.     Adrian Branch asks if unable to reach him on number above he asks to call his office phone [phone]

## 2022-03-16 NOTE — ANESTHESIA PROCEDURE NOTES
Peripheral Block    Patient location during procedure: procedure area  Staffing  Performed: anesthesiologist   Preanesthetic Checklist  Completed: patient identified, IV checked, site marked, risks and benefits discussed, surgical consent, monitors and equipment checked, pre-op evaluation, timeout performed, anesthesia consent given, oxygen available and patient being monitored  Peripheral Block  Patient position: supine  Prep: alcohol swabs  Patient monitoring: cardiac monitor, continuous pulse ox and frequent blood pressure checks  Block type: Femoral  Laterality: left  Injection technique: single-shot  Guidance: ultrasound guided  Infiltration strength: 0 %  Dose: 0 mL  Femoral crease  Provider prep: mask and sterile gloves  Needle  Needle type: short-bevel   Needle gauge: 22 G  Needle length: 8 cm  Needle localization: ultrasound guidance  Assessment  Injection assessment: negative aspiration for heme, no paresthesia on injection and local visualized surrounding nerve on ultrasound  Paresthesia pain: none  Slow fractionated injection: yes  Hemodynamics: stable  Additional Notes  Mixture of 0.5% Ropivacaine 25 mL injected in incremental 5 mL after negative blood aspiration.   Medications Administered  Ropivacaine (NAROPIN) injection 0.5%, 25 mL  Reason for block: post-op pain management and at surgeon's request

## 2022-03-16 NOTE — PROGRESS NOTES
Hospitalist Progress Note      Synopsis: Patient admitted on 3/15/2022 80 y.o. male who presents with the chief complaint of left leg pain. Patient is a transfer from [facility]. He is a resident of [facility] as [place]. Yesterday he was reported noticed to have swelling of his left leg. He was taken for x-ray and found to have a left femur fracture. There was no fall that was witnessed and they believe the patient got himself back into his chair. Patient is unsure of what happened but states he thinks he fell. He does ambulate short distances to transfer with a cane. He is unsure if he has had a loss in consciousness. 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 He states he has had several falls. Denies numbness/tingling/paresthesias. Denies any other orthopedic complaints at this time. Subjective    Clinically improving. Feeling better. Notes some lightheadedness. Denies any black or bloody stools. Stable overnight. No other overnight issues reported. No CP, SOB, palpitations, blurred vision, HA, lightheadedness, LOC or focal neurological deficits    Exam:  BP 96/67   Pulse 72   Temp 98 °F (36.7 °C) (Oral)   Resp 16   Ht 5' 8\" (1.727 m)   Wt 140 lb (63.5 kg)   SpO2 94%   BMI 21.29 kg/m²   General appearance: No apparent distress, appears stated age and cooperative. HEENT: Pupils equal, round, and reactive to light. Conjunctivae/corneas clear. Neck: Supple. No jugular venous distention. Trachea midline. Respiratory:  Normal respiratory effort. Clear to auscultation, bilaterally without Rales/Wheezes/Rhonchi. Cardiovascular: Regular rate and rhythm with normal S1/S2 without murmurs, rubs or gallops. Abdomen: Soft, non-tender, non-distended with normal bowel sounds. Musculoskeletal: No clubbing, cyanosis or edema bilaterally. Brisk capillary refill. 2+ lower extremity pulses (dorsalis pedis).    Skin:  No rashes incision CDI, dressed  Neurologic: awake, alert and following commands     Medications:  Reviewed    Infusion Medications    sodium chloride      sodium chloride 75 mL/hr at 03/15/22 2141     Scheduled Medications    allopurinol  100 mg Oral Daily    latanoprost  1 drop Both Eyes Nightly    colchicine  0.6 mg Oral TID    dorzolamide  2 drop Both Eyes Daily    levothyroxine  125 mcg Oral Daily    memantine  10 mg Oral BID    mirtazapine  7.5 mg Oral Nightly    tamsulosin  0.8 mg Oral Daily    sodium chloride flush  10 mL IntraVENous 2 times per day    [START ON 3/21/2022] vitamin D  50,000 Units Oral Weekly    ceFAZolin  2,000 mg IntraVENous On Call to OR     PRN Meds: sodium chloride flush, sodium chloride, promethazine **OR** ondansetron, polyethylene glycol, acetaminophen **OR** acetaminophen, potassium chloride **OR** potassium alternative oral replacement **OR** potassium chloride, magnesium sulfate, HYDROcodone-acetaminophen, fentanNYL    I/O    Intake/Output Summary (Last 24 hours) at 3/16/2022 1037  Last data filed at 3/16/2022 0557  Gross per 24 hour   Intake 751 ml   Output 200 ml   Net 551 ml       Labs:   Recent Labs     03/15/22  0308 03/16/22  0502   WBC 11.5* 7.8   HGB 8.5* 7.6*   HCT 25.4* 24.8*    261       Recent Labs     03/15/22  0308 03/16/22  0502    145   K 3.5* 3.3*    110*   CO2 25 26   BUN 33* 23   CREATININE 0.8 0.7   CALCIUM 8.2* 8.0*       No results for input(s): PROT, ALB, ALKPHOS, ALT, AST, BILITOT, AMYLASE, LIPASE in the last 72 hours. Recent Labs     03/15/22  2112   INR 1.1       No results for input(s): Suejy Shrestha in the last 72 hours. Chronic labs:  Lab Results   Component Value Date    CHOL 156 07/15/2020    TRIG 77 07/15/2020    HDL 40 07/15/2020    TSH 0.147 (L) 07/15/2020    INR 1.1 03/15/2022       Radiology:  Imaging studies reviewed today.     ASSESSMENT:    Principal Problem:    Other fracture of left femur, initial encounter for closed fracture [facility])  Active Problems:    Dementia without behavioral disturbance (HCC)    Hypothyroidism    Hypotension  Resolved Problems:    * No resolved hospital problems. *       PLAN:    Admit for Left periprosthetic femoral shaft fracture  Pain control  Orthopedic consult appreciated  Patient may proceed to surgery without further risk stratification  -Anticipate need for blood transfusion  -Monitor hemoglobin transfuse for less than 7  Check iron studies, B12, folate  Replace potassium  Continue memantine  Start midodrine, continue IVF  Medications for other co morbidities cont as appropriate w dosage adjustments as necessary  PT/OT  DVT PPx  DC planning        Discussed with sameer Lopes-discussed diagnosis prognosis and plan of care including perioperative risks acutely and subacutely. All questions answered. Diet: No diet orders on file  Code Status: DNR-CCA  PT/OT Eval Status:   Ordered  DVT Prophylaxis:   Per orthopedics  Recommended disposition at discharge:   TBD    +++++++++++++++++++++++++++++++++++++++++++++++++  David Aceves MD   [facility].  +++++++++++++++++++++++++++++++++++++++++++++++++  NOTE: This report was transcribed using voice recognition software. Every effort was made to ensure accuracy; however, inadvertent computerized transcription errors may be present.

## 2022-03-17 LAB
ANION GAP SERPL CALCULATED.3IONS-SCNC: 9 MMOL/L (ref 7–16)
ANISOCYTOSIS: ABNORMAL
BASOPHILS ABSOLUTE: 0 E9/L (ref 0–0.2)
BASOPHILS RELATIVE PERCENT: 0.1 % (ref 0–2)
BUN BLDV-MCNC: 22 MG/DL (ref 6–23)
CALCIUM SERPL-MCNC: 7.6 MG/DL (ref 8.6–10.2)
CHLORIDE BLD-SCNC: 111 MMOL/L (ref 98–107)
CO2: 22 MMOL/L (ref 22–29)
CORTISOL TOTAL: 3.34 MCG/DL (ref 2.68–18.4)
CREAT SERPL-MCNC: 0.7 MG/DL (ref 0.7–1.2)
EOSINOPHILS ABSOLUTE: 0 E9/L (ref 0.05–0.5)
EOSINOPHILS RELATIVE PERCENT: 0 % (ref 0–6)
FOLATE: 7.3 NG/ML (ref 4.8–24.2)
GFR AFRICAN AMERICAN: >60
GFR NON-AFRICAN AMERICAN: >60 ML/MIN/1.73
GLUCOSE BLD-MCNC: 138 MG/DL (ref 74–99)
HCT VFR BLD CALC: 24.2 % (ref 37–54)
HEMOGLOBIN: 7.8 G/DL (ref 12.5–16.5)
HYPOCHROMIA: ABNORMAL
LYMPHOCYTES ABSOLUTE: 1.07 E9/L (ref 1.5–4)
LYMPHOCYTES RELATIVE PERCENT: 11.3 % (ref 20–42)
MCH RBC QN AUTO: 31 PG (ref 26–35)
MCHC RBC AUTO-ENTMCNC: 32.2 % (ref 32–34.5)
MCV RBC AUTO: 96 FL (ref 80–99.9)
METAMYELOCYTES RELATIVE PERCENT: 0.9 % (ref 0–1)
MONOCYTES ABSOLUTE: 1.16 E9/L (ref 0.1–0.95)
MONOCYTES RELATIVE PERCENT: 12.2 % (ref 2–12)
MYELOCYTE PERCENT: 0.8 % (ref 0–0)
NEUTROPHILS ABSOLUTE: 7.47 E9/L (ref 1.8–7.3)
NEUTROPHILS RELATIVE PERCENT: 74.8 % (ref 43–80)
NUCLEATED RED BLOOD CELLS: 0.9 /100 WBC
OVALOCYTES: ABNORMAL
PDW BLD-RTO: 16 FL (ref 11.5–15)
PLATELET # BLD: 261 E9/L (ref 130–450)
PMV BLD AUTO: 11.2 FL (ref 7–12)
POIKILOCYTES: ABNORMAL
POLYCHROMASIA: ABNORMAL
POTASSIUM SERPL-SCNC: 3.8 MMOL/L (ref 3.5–5)
RBC # BLD: 2.52 E12/L (ref 3.8–5.8)
SODIUM BLD-SCNC: 142 MMOL/L (ref 132–146)
TSH SERPL DL<=0.05 MIU/L-ACNC: 13.46 UIU/ML (ref 0.27–4.2)
VITAMIN B-12: 653 PG/ML (ref 211–946)
WBC # BLD: 9.7 E9/L (ref 4.5–11.5)

## 2022-03-17 PROCEDURE — 36415 COLL VENOUS BLD VENIPUNCTURE: CPT

## 2022-03-17 PROCEDURE — 2060000000 HC ICU INTERMEDIATE R&B

## 2022-03-17 PROCEDURE — 84443 ASSAY THYROID STIM HORMONE: CPT

## 2022-03-17 PROCEDURE — 82607 VITAMIN B-12: CPT

## 2022-03-17 PROCEDURE — 2580000003 HC RX 258: Performed by: INTERNAL MEDICINE

## 2022-03-17 PROCEDURE — 6370000000 HC RX 637 (ALT 250 FOR IP): Performed by: STUDENT IN AN ORGANIZED HEALTH CARE EDUCATION/TRAINING PROGRAM

## 2022-03-17 PROCEDURE — 97530 THERAPEUTIC ACTIVITIES: CPT

## 2022-03-17 PROCEDURE — 82533 TOTAL CORTISOL: CPT

## 2022-03-17 PROCEDURE — 97535 SELF CARE MNGMENT TRAINING: CPT

## 2022-03-17 PROCEDURE — 85025 COMPLETE CBC W/AUTO DIFF WBC: CPT

## 2022-03-17 PROCEDURE — 97162 PT EVAL MOD COMPLEX 30 MIN: CPT

## 2022-03-17 PROCEDURE — 80048 BASIC METABOLIC PNL TOTAL CA: CPT

## 2022-03-17 PROCEDURE — 2580000003 HC RX 258: Performed by: STUDENT IN AN ORGANIZED HEALTH CARE EDUCATION/TRAINING PROGRAM

## 2022-03-17 PROCEDURE — 6360000002 HC RX W HCPCS: Performed by: STUDENT IN AN ORGANIZED HEALTH CARE EDUCATION/TRAINING PROGRAM

## 2022-03-17 PROCEDURE — 6370000000 HC RX 637 (ALT 250 FOR IP): Performed by: INTERNAL MEDICINE

## 2022-03-17 PROCEDURE — 97165 OT EVAL LOW COMPLEX 30 MIN: CPT

## 2022-03-17 PROCEDURE — 82746 ASSAY OF FOLIC ACID SERUM: CPT

## 2022-03-17 RX ORDER — MIDODRINE HYDROCHLORIDE 10 MG/1
10 TABLET ORAL
Status: DISCONTINUED | OUTPATIENT
Start: 2022-03-17 | End: 2022-03-19 | Stop reason: HOSPADM

## 2022-03-17 RX ORDER — 0.9 % SODIUM CHLORIDE 0.9 %
250 INTRAVENOUS SOLUTION INTRAVENOUS ONCE
Status: COMPLETED | OUTPATIENT
Start: 2022-03-17 | End: 2022-03-17

## 2022-03-17 RX ADMIN — COLCHICINE 0.6 MG: 0.6 TABLET ORAL at 13:52

## 2022-03-17 RX ADMIN — MIDODRINE HYDROCHLORIDE 5 MG: 2.5 TABLET ORAL at 11:08

## 2022-03-17 RX ADMIN — SODIUM CHLORIDE 250 ML: 9 INJECTION, SOLUTION INTRAVENOUS at 17:07

## 2022-03-17 RX ADMIN — FENTANYL CITRATE 25 MCG: 50 INJECTION INTRAMUSCULAR; INTRAVENOUS at 13:45

## 2022-03-17 RX ADMIN — TAMSULOSIN HYDROCHLORIDE 0.4 MG: 0.4 CAPSULE ORAL at 09:24

## 2022-03-17 RX ADMIN — Medication 2000 MG: at 09:23

## 2022-03-17 RX ADMIN — MIDODRINE HYDROCHLORIDE 10 MG: 10 TABLET ORAL at 17:37

## 2022-03-17 RX ADMIN — DORZOLAMIDE HYDROCHLORIDE 2 DROP: 20 SOLUTION/ DROPS OPHTHALMIC at 09:24

## 2022-03-17 RX ADMIN — ENOXAPARIN SODIUM 40 MG: 100 INJECTION SUBCUTANEOUS at 11:08

## 2022-03-17 RX ADMIN — SODIUM CHLORIDE: 9 INJECTION, SOLUTION INTRAVENOUS at 13:47

## 2022-03-17 RX ADMIN — LATANOPROST 1 DROP: 50 SOLUTION OPHTHALMIC at 20:16

## 2022-03-17 RX ADMIN — SODIUM CHLORIDE, PRESERVATIVE FREE 10 ML: 5 INJECTION INTRAVENOUS at 20:17

## 2022-03-17 RX ADMIN — ALLOPURINOL 100 MG: 100 TABLET ORAL at 09:24

## 2022-03-17 RX ADMIN — COLCHICINE 0.6 MG: 0.6 TABLET ORAL at 20:15

## 2022-03-17 RX ADMIN — SODIUM CHLORIDE, PRESERVATIVE FREE 10 ML: 5 INJECTION INTRAVENOUS at 09:23

## 2022-03-17 RX ADMIN — COLCHICINE 0.6 MG: 0.6 TABLET ORAL at 09:24

## 2022-03-17 RX ADMIN — LEVOTHYROXINE SODIUM 125 MCG: 0.07 TABLET ORAL at 09:24

## 2022-03-17 RX ADMIN — SODIUM CHLORIDE: 9 INJECTION, SOLUTION INTRAVENOUS at 20:19

## 2022-03-17 RX ADMIN — MEMANTINE HYDROCHLORIDE 10 MG: 10 TABLET, FILM COATED ORAL at 20:16

## 2022-03-17 RX ADMIN — MIRTAZAPINE 7.5 MG: 15 TABLET, FILM COATED ORAL at 20:16

## 2022-03-17 RX ADMIN — MIDODRINE HYDROCHLORIDE 5 MG: 2.5 TABLET ORAL at 09:24

## 2022-03-17 RX ADMIN — ACETAMINOPHEN 650 MG: 325 TABLET ORAL at 09:23

## 2022-03-17 RX ADMIN — MEMANTINE HYDROCHLORIDE 10 MG: 10 TABLET, FILM COATED ORAL at 09:23

## 2022-03-17 ASSESSMENT — PAIN SCALES - GENERAL
PAINLEVEL_OUTOF10: 0
PAINLEVEL_OUTOF10: 0
PAINLEVEL_OUTOF10: 4
PAINLEVEL_OUTOF10: 8
PAINLEVEL_OUTOF10: 0
PAINLEVEL_OUTOF10: 5

## 2022-03-17 ASSESSMENT — PAIN DESCRIPTION - PAIN TYPE
TYPE: ACUTE PAIN
TYPE: ACUTE PAIN;SURGICAL PAIN

## 2022-03-17 ASSESSMENT — PAIN DESCRIPTION - DESCRIPTORS: DESCRIPTORS: ACHING

## 2022-03-17 ASSESSMENT — PAIN DESCRIPTION - ONSET: ONSET: GRADUAL

## 2022-03-17 ASSESSMENT — PAIN DESCRIPTION - ORIENTATION
ORIENTATION: LEFT
ORIENTATION: LEFT

## 2022-03-17 ASSESSMENT — PAIN DESCRIPTION - PROGRESSION: CLINICAL_PROGRESSION: GRADUALLY WORSENING

## 2022-03-17 ASSESSMENT — PAIN DESCRIPTION - LOCATION
LOCATION: LEG
LOCATION: LEG

## 2022-03-17 ASSESSMENT — PAIN DESCRIPTION - FREQUENCY: FREQUENCY: CONTINUOUS

## 2022-03-17 ASSESSMENT — PAIN - FUNCTIONAL ASSESSMENT: PAIN_FUNCTIONAL_ASSESSMENT: PREVENTS OR INTERFERES SOME ACTIVE ACTIVITIES AND ADLS

## 2022-03-17 NOTE — PROGRESS NOTES
Department of Orthopedic Surgery  Resident Progress Note    Patient seen and examined. He has pain to the operative extremity. No additional complaints, including no shortness of breath, no nausea or vomiting. No fevers or chills. No changes in sensation. VITALS:  BP 94/64   Pulse 78   Temp 96.9 °F (36.1 °C) (Temporal)   Resp 16   Ht 5' 8\" (1.727 m)   Wt 140 lb (63.5 kg)   SpO2 98%   BMI 21.29 kg/m²     General: alert and oriented to person, place and time    MUSCULOSKELETAL:   left lower extremity:  · Dressing C/D/I  · Compartments soft and compressible  · +PF/DF/EHL  · +2/4 DP & PT pulses, Brisk Cap refill, Toes warm and perfused  · Distal sensation grossly intact to Peroneals, Sural, Saphenous, and tibial nrs    CBC:   Lab Results   Component Value Date    WBC 9.7 03/17/2022    HGB 7.8 03/17/2022    HCT 24.2 03/17/2022     03/17/2022     PT/INR:    Lab Results   Component Value Date    PROTIME 12.4 03/15/2022    INR 1.1 03/15/2022           ASSESSMENT  · S/P open reduction internal fixation of left periprosthetic femur fracture on 3/17/2022    PLAN      · Continue physical therapy and protocol: NWB - LLE in knee immobilizer, transition to hinged knee brace 0-30  · 24 hour abx coverage  · Deep venous thrombosis prophylaxis - Lovenox 40 mg in hospital, early mobilization  · PT/OT  · Pain Control: IV and PO  · Monitor H&H, 7.8, currently asymptomatic in bed  · D/C Plan:  PT/OT/SW appreciated    Orthopaedic Attending    I have seen and evaluated the patient with the resident and agree with the above assessments on today's visit. I have performed the key components of the history and physical examination and concur completely with the findings and plans as documented above.     Electronically signed by   Erik Stewart DO  3/17/2022

## 2022-03-17 NOTE — PROGRESS NOTES
Orthotic consult called to [facility].    Face sheet, order, ticket to ride and snap shot faxed to [phone] [employer] RN, BSN

## 2022-03-17 NOTE — PLAN OF CARE
Problem: Pain:  Goal: Pain level will decrease  Description: Pain level will decrease  Outcome: Met This Shift  Goal: Control of acute pain  Description: Control of acute pain  Outcome: Met This Shift  Goal: Control of chronic pain  Description: Control of chronic pain  Outcome: Met This Shift     Problem: Falls - Risk of:  Goal: Will remain free from falls  Description: Will remain free from falls  Outcome: Met This Shift  Goal: Absence of physical injury  Description: Absence of physical injury  Outcome: Met This Shift     Problem: Skin Integrity:  Goal: Will show no infection signs and symptoms  Description: Will show no infection signs and symptoms  Outcome: Met This Shift  Goal: Absence of new skin breakdown  Description: Absence of new skin breakdown  Outcome: Met This Shift     Problem: Musculor/Skeletal Functional Status  Goal: Highest potential functional level  Outcome: Met This Shift  Goal: Absence of falls  Outcome: Met This Shift

## 2022-03-17 NOTE — ANESTHESIA POSTPROCEDURE EVALUATION
Department of Anesthesiology  Postprocedure Note    Patient: Demetrius Siddiqui  MRN: [id number]  [place]: 1934  Date of evaluation: 3/17/2022  Time:  8:31 AM     Procedure Summary     Date: 03/16/22 Room / Location: [facility] 09 / [facility]    Anesthesia Start: 1103 Anesthesia Stop: 0495    Procedure: LEFT FEMUR PERIPROSTHETIC FRACTURE OPEN REDUCTION INTERNAL FIXATION (Left Leg Upper) Diagnosis: (LEFT FEMUR FX)    Surgeons: Arvind Noble DO Responsible Provider: Shaw [place] MD    Anesthesia Type: general ASA Status: 3          Anesthesia Type: general    Deven Phase I: Deven Score: 10    Deven Phase II:      Last vitals: Reviewed and per EMR flowsheets.        Anesthesia Post Evaluation    Patient location during evaluation: PACU  Patient participation: complete - patient participated  Level of consciousness: awake  Pain score: 0  Airway patency: patent  Nausea & Vomiting: no nausea  Complications: no  Cardiovascular status: hemodynamically stable  Respiratory status: acceptable  Hydration status: stable

## 2022-03-17 NOTE — PROGRESS NOTES
[address]      Date:3/17/2022                                                  Patient Name: Alexandru Hernandez  MRN: [id number]  : 1934  Room: [address] [place] [address]    Evaluating OT: ANNA Manrique, OTR/L  # [id number]    Referring Provider:  Margarette Russell MD  Specific Provider Orders:  Charmaine Muro and Treat\"  3-17-22    Diagnosis: Other fracture of left femur, initial encounter for closed fracture ([facility] [address] [S72.8X2A]    Pt was transferred from outside hospital after falling at [facility](?) sustaining L Femur Fracture    Pertinent Medical History:  Pt has no past medical history on file. ,  has a past surgical history that includes Femur fracture surgery (Left, 3/16/2022).     Surgeries this admission: 3-16-22: LEFT FEMUR PERIPROSTHETIC FRACTURE OPEN REDUCTION INTERNAL FIXATION w/ Dr. Nickie Weston       Precautions:  Fall Risk  NWB L LE  Knee Immobilizer -> Hinged LE Brace 0-30*  Hypotension    Assessment of current deficits   [x] Functional mobility   [x]ADLs  [x] Strength               [x]Cognition   [x] Functional transfers   [x] IADLs         [x] Safety Awareness   [x]Endurance   [] Fine Coordination              [x] Balance      [] Vision/perception   []Sensation    []Gross Motor Coordination  [] ROM  [] Delirium                   [] Motor Control       OT PLAN OF CARE   OT POC based on physician orders, patient diagnosis and results of clinical assessment    Frequency/Duration 1-3 days/wk for 2 weeks PRN   Specific OT Treatment to include:   * Instruction/training on adapted ADL techniques and AE recommendations to increase functional independence within precautions       * Training on energy conservation strategies, correct breathing pattern and techniques to improve independence/tolerance for self-care routine  * Functional transfer/mobility training/DME recommendations for increased independence, safety, and fall prevention  * Patient/Family education to increase follow through with safety techniques and functional independence  * Recommendation of environmental modifications for increased safety with functional transfers/mobility and ADLs  * Cognitive retraining/development of therapeutic activities to improve problem solving, judgement, memory, and attention for increased safety/participation in ADL/IADL tasks  * Therapeutic exercise to improve motor endurance, ROM, and functional strength for ADLs/functional transfers  * Therapeutic activities to facilitate/challenge dynamic balance, stand tolerance for increased safety and independence with ADLs  * Therapeutic activities to facilitate gross/fine motor skills for increased independence with ADLs  * Neuro-muscular re-education: facilitation of righting/equilibrium reactions, midline orientation, scapular stability/mobility, normalization of muscle tone, and facilitation of volitional active controled movement  * Positioning to improve skin integrity, interaction with environment and functional independence  * Delirium prevention/treatment  * Manual techniques for edema management  Other:    Recommended Adaptive Equipment: TBD as pt progresses     Pt was a Fair Historian - no previous therapy records available for information:    Home Living:  Per chart, pt was transferred from a SNF - pt reported being there \"only for a couple days\". Unable to report his location just prior to being in SNF   Bathroom setup:  Assume Walk-in-Shower, Standard Commode   Equipment owned:  Pt reported having [facility] for short distances b/t surfaces and W/C - likely belonging to facility for extended distances    Available Family Assist:  Staff assist    Prior Level of Function:  Pt's report was inconsistent and vague - assume he received assist with Transfers and Mobility using SPC vs WC for ambulation.  Pt reported being IND with w/c mobility  Driving:  Not currently  Occupation:   in the automotive industry    Pain Level:  Unable to quantify - reported \"Moderate\" Pain L LE throughout session - mild Relief w/ Rest and Repositioning, Nsg Notified   Additional Complaints:  Mild-Moderate Dizziness w/ ax    Cognition: A & O x 2 - pt was able to state his full name and , able to ID his current location as \"hospital\", however, ID'd current as \"[place]\", Min VCs to ID current Month/Year and general situation    Able to Follow Multi-Step Commands w/ Min VCs   Memory:  fair  - inconsistent - appeared to have Fairly good LTM, Fair(-) STM   Sequencing:  fair    Problem solving:  fair    Judgement/safety:  fair   Additional Comments:  Pt was pleasant and cooperative. Vitals/Lab Values:  BP in supine prior to initiation of ax = 89/57  BP seated EOB = 74/55 w/ c/o mild-moderate dizziness  RN Notified     Functional Assessment:  AM-PAC Daily Activity Raw Score:      Initial Eval Status  Date: 3/17/22   Treatment Status  Date: STGs = LTGs  Time frame: 10-14 days   Feeding IND after set up    In high wade position    NA   Grooming Min A/Set up    For simple tasks seated EOB  Close SUP For safety w/ sitting balance unsupported at EOB    Min A  Standing at the [place] A/set up    Simulated - seated EOB    Set up     LB Dressing Dep    Max A to don socks seated EOB  Max A - simulated donning of brace  Unsafe to stand for clothing adjustment over hips d/t symptomatic hypotension  Pt ed for safe/adaptive techs, use of adaptive equip    Mod A  Seated/standing     Bathing NT    Pt ed re: Benefits of use of Shower chair/Tub Bench, resources for obtaining equip    Mod A   Seated/standing     Toileting Dep    Cook Catheter  Unsafe to stand w/ Max A of 2 for task - Max A of 2 simulated in supine    Mod A  Seated/standing     Bed Mobility  Supine to sit: Mod A   Sit to supine:   Mod A     Mod VCs for safety    Supine to sit: SUP  Sit to supine: SUP Functional Transfers NT    Unsafe to stand d/t symptomatic hypotension  Pt ed re: NWB status    Min A     Functional Mobility NT    Min A     Balance Sitting:     Static:  Close SUP EOB    Dynamic:  Min A w/ functional ax EOB     Standing:     Static:  NT    Dynamic:  NT w/ functional ax/mobility     Sitting:     Static:  IND    Dynamic:  SUP w/ functional ax    Standing:     Static:  Min A w/ AD PRN    Dynamic:  Min A w/ functional ax/mobility w/ AD PRN   Activity Tolerance Fair     Able to tolerate sitting EOB w/ ax > 15 mins  Limited by hypotension    Fair(+)   Visual/  Perceptual    Hearing: WNL   Glasses: Yes    WFL - slightly [place]  Hearing Aids:  No               Hand Dominance: Right   AROM Strength Additional Info:    RUE  WFL 4/5 shoulder flex  4+/5 otherwise Good ;   Good(-) FMC/dexterity noted during ADL tasks     LUE WFL 5-/5 throughout Good ;    Good(-) FMC/dexterity noted during ADL tasks       Sensation:  Denies numbness or tingling Kojo UEs   Tone: WFL Kojo UEs   Edema: None Noted Kojo UEs     Comments: Upon arrival, patient was found in supine. He was agreeable to participate in therapeutic ax. No Family present during session. Received permission from RN prior to engaging pt in OT services. Educated pt on role of OT services. At the end of the session, patient was properly positioned in Semi-Supine w/ L LE elevated for pain mgmt/edema control. Call light and phone within reach, all lines and tubes intact. Oriented pt to call bell. Made all appropriate Environmental Modifications to facilitate pt's level of IND and safety. All needs met. Bed Alarm activated. Overall patient demonstrated decreased independence and safety during completion of ADL/functional transfer/mobility tasks. Pt would benefit from continued skilled OT to increase safety and independence with completion of ADL/IADL tasks for functional independence and quality of life.     Treatment: OT treatment provided this date includes:    Instruction/training on safety and adapted techniques for completion of ADLs, use of DME/AD/Adaptive equip: w/ adherence to NWB status    Instruction/training on safe functional mobility/transfer techniques, use of DME/AD: with adherence to NWB status    Instruction/training on energy conservation techs (EC)/Pursed-Lip Breathing (PLB)/work simplification for completion of ADLs:      Neuromuscular Reeducation to facilitate balance/righting reactions for increased function with ADLs:     Skilled positioning/alignment for Pain Mgmt, Skin Integrity, Edema Control, to maximize Pt's safety and ability to [facility] interact w/ his/her environment, maximize respiratory status   Activity tolerance - Sitting/Standing to improve endurance w/ functional ax    Cognitive retraining -  Oriented pt to current Date, Place and Situation; Cues for safety/safety awareness, sequencing, problem solving     Skilled monitoring of Vitals during session and pt's response to tx ax       Consulted RN, JIMMY/JOSSE - Assessment completed in collaboration w/ PT for pt's safety     Made all appropriate Environmental Modifications to facilitate pt's level of IND and safety.  Recommendations for Continued Participation in OT services during Hospitalization and at D/C - SNF    Pt and/or Family verbalized/demonstrated a Fair understanding of education provided. Will Review PRN. Rehab Potential: Good(-) for established goals     Patient / Family Goal: Not stated at this time      Patient and/or family were instructed on functional diagnosis, prognosis/goals and OT plan of care. Demonstrated Fair understanding.      Eval Complexity: Low    Time In: 5207  Time Out: 1112  Total Treatment Time: 23 minutes    Min Units   OT Eval Low 97165  X  1   OT Eval Medium 43610      OT Eval High 53725      OT Re-Eval [id number]       Therapeutic Ex 42325       Therapeutic Activities 27805       ADL/Self Care 09759  23  2 Orthotic Management [id number]       Manual [id number]     Neuro Re-Ed 44765       Non-Billable Time              Evaluation Time additionally includes thorough review of current medical information, gathering information on past medical history/social history and prior level of function, completion of standardized testing/informal observation of tasks, assessment of data and education on plan of care and goals.             Paul Cast, MOT, OTR/L  # [id number]

## 2022-03-17 NOTE — CARE COORDINATION
Patient confused, from [facility]. Facility will submit for precert once therapy available. Message left with son, Lorelei Caputo to confirm plan is to return. Ambulance on soft chart. Spoke with son, [facility], plan is to return to [facility] when stable. For questions I can be reached at [phone].  Lety Mcconnell, [place]

## 2022-03-17 NOTE — PROGRESS NOTES
Physical Therapy  Physical Therapy Initial Assessment     Name: Janina Monte  : 1934  MRN: [id number]      Date of Service: 3/17/2022    Evaluating PT:  Marcelino Veliz PT, DPT [id number]    Room #:  7810/3573-O  Diagnosis:  Other fracture of left femur, initial encounter for closed fracture ([facility] [address] [S72.8X2A]  PMHx/PSHx:  None documented  Procedure/Surgery:  LEFT FEMUR PERIPROSTHETIC FRACTURE OPEN REDUCTION INTERNAL FIXATION (3/16/2022)  Precautions:  Fall risk, cognition, alarm, NWB LLE, L knee immobilizer  Equipment Needs:  TBD  Equipment Owned: SPC, manual wheelchair    SUBJECTIVE:    Patient is questionable historian. Per chart, patient from nursing facility. Prior to admission, patient ambulated short distances with SPC; used manual wheelchair for mobility. OBJECTIVE:   Initial Evaluation  Date: 3/17/2022 Treatment Short Term/ Long Term   Goals   AM-PAC 6 Clicks      Was pt agreeable to Eval/treatment? Yes     Does pt have pain? Mild LLE     Bed Mobility  Rolling: NT  Supine to sit: ModA  Sit to supine: ModA  Scooting: Pavel (seated)  Rolling: SBA  Supine to sit: SBA  Sit to supine: SBA  Scooting: SBA   Transfers Sit to stand: NT  Stand to sit: NT  Stand pivot: NT  Sit to stand: SBA with AAD  Stand to sit: SBA with AAD  Stand pivot: SBA with AAD   Ambulation    NT  3 feet with AAD SBA   Stair negotiation: ascended and descended  NT  TBD   ROM BUE:  See OT note  BLE:  WFL     Strength BUE:  See OT note  RLE:  Grossly 5/5  LLE: Ankle dorsiflexion 5/5     Balance Sitting EOB:  SBA  Dynamic Standing:  NT  Sitting EOB:  Supervision  Dynamic Standing:  SBA with AAD     Pt is A & O x 3 (self, month, situation)  Sensation:  Pt reports LLE numbness and tingling  Edema:  Unremarkable    Vitals:   HR 67, BP 89/57 at rest.  HR 80, BP 74/55 during activity. Patient education  Pt educated on role of PT, weight bearing status, safety during functional mobility, use of call light for assistance.     Patient diagnosis     Referring provider/PT Order:    03/17/22 0730  PT eval and treat  Start:  03/17/22 0730,   End:  03/17/22 0730,   ONE TIME,   Standing Count:  1 Occurrences,   R         Torin Weinstein MD       Diagnosis:  Other fracture of left femur, initial encounter for closed fracture [facility]) [S72.8X2A]  Specific instructions for next treatment:  Continue to facilitate safe performance of functional mobility; progress as appropriate. Current Treatment Recommendations:     [x] Strengthening to improve independence with functional mobility   [] ROM to improve independence with functional mobility   [x] Balance Training to improve static/dynamic balance and to reduce fall risk  [x] Endurance Training to improve activity tolerance during functional mobility   [x] Transfer Training to improve safety and independence with all functional transfers   [x] Gait Training to improve gait mechanics, endurance and assess need for appropriate assistive device  [] Stair Training in preparation for safe discharge home and/or into the community   [x] Positioning to prevent skin breakdown and contractures  [x] Safety and Education Training   [x] Patient/Caregiver Education   [] HEP  [] Other     PT long term treatment goals are located in above grid    Frequency of treatments: 2-5x/week x 1-2 weeks. Time in  1035  Time out  1110    Total Treatment Time  25 minutes     Evaluation Time includes thorough review of current medical information, gathering information on past medical history/social history and prior level of function, completion of standardized testing/informal observation of tasks, assessment of data and education on plan of care and goals.     CPT codes:  [] Low Complexity PT evaluation 51985  [x] Moderate Complexity PT evaluation 18675  [] High Complexity PT evaluation 29183  [] PT Re-evaluation 52489  [] Gait training 73345 0 minutes  [] Manual therapy 63324 0 minutes  [x] Therapeutic activities 47328 58 minutes  [] Therapeutic exercises 72288 0 minutes  [] Neuromuscular reeducation 42055 0 minutes     Silvestre Couch PT, DPT  [id number]

## 2022-03-17 NOTE — PROGRESS NOTES
Hospitalist Progress Note      Synopsis: Patient admitted on 3/15/2022 80 y.o. male who presents with the chief complaint of left leg pain. Patient is a transfer from [place]. He is a resident of [facility] [place]. Yesterday he was reported noticed to have swelling of his left leg. He was taken for x-ray and found to have a left femur fracture. There was no fall that was witnessed and they believe the patient got himself back into his chair. Patient is unsure of what happened but states he thinks he fell. He does ambulate short distances to transfer with a cane. He is unsure if he has had a loss in consciousness. 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 He states he has had several falls. Denies numbness/tingling/paresthesias. Denies any other orthopedic complaints at this time. Subjective    Clinically improving. Feeling better. Notes some lightheadedness. Denies any black or bloody stools. Stable overnight. No other overnight issues reported. No CP, SOB, palpitations, blurred vision, HA, lightheadedness, LOC or focal neurological deficits    Exam:  BP (!) 97/52   Pulse 64   Temp 97.7 °F (36.5 °C) (Oral)   Resp 18   Ht 5' 8\" (1.727 m)   Wt 140 lb (63.5 kg)   SpO2 95%   BMI 21.29 kg/m²   General appearance: No apparent distress, appears stated age and cooperative. HEENT: Pupils equal, round, and reactive to light. Conjunctivae/corneas clear. Neck: Supple. No jugular venous distention. Trachea midline. Respiratory:  Normal respiratory effort. Clear to auscultation, bilaterally without Rales/Wheezes/Rhonchi. Cardiovascular: Regular rate and rhythm with normal S1/S2 without murmurs, rubs or gallops. Abdomen: Soft, non-tender, non-distended with normal bowel sounds. Musculoskeletal: No clubbing, cyanosis or edema bilaterally. Brisk capillary refill. 2+ lower extremity pulses (dorsalis pedis).    Skin:  No rashes incision CDI, dressed  Neurologic: awake, alert and following commands     Medications:  Reviewed    Infusion Medications    sodium chloride      sodium chloride 75 mL/hr (03/16/22 1528)     Scheduled Medications    midazolam  2 mg IntraVENous Once    enoxaparin  40 mg SubCUTAneous Daily    tamsulosin  0.4 mg Oral Daily    midodrine  5 mg Oral TID WC    allopurinol  100 mg Oral Daily    latanoprost  1 drop Both Eyes Nightly    colchicine  0.6 mg Oral TID    dorzolamide  2 drop Both Eyes Daily    levothyroxine  125 mcg Oral Daily    memantine  10 mg Oral BID    mirtazapine  7.5 mg Oral Nightly    sodium chloride flush  10 mL IntraVENous 2 times per day    [START ON 3/21/2022] vitamin D  50,000 Units Oral Weekly     PRN Meds: sodium chloride, sodium chloride flush, promethazine **OR** ondansetron, polyethylene glycol, acetaminophen **OR** acetaminophen, potassium chloride **OR** potassium alternative oral replacement **OR** potassium chloride, magnesium sulfate, fentanNYL    I/O    Intake/Output Summary (Last 24 hours) at 3/17/2022 1156  Last data filed at 3/17/2022 0905  Gross per 24 hour   Intake 2696 ml   Output 940 ml   Net 1756 ml       Labs:   Recent Labs     03/15/22  0308 03/15/22  0308 03/16/22  0502 03/16/22  1447 03/17/22  0422   WBC 11.5*  --  7.8  --  9.7   HGB 8.5*   < > 7.6* 8.1* 7.8*   HCT 25.4*   < > 24.8* 25.3* 24.2*     --  261  --  261    < > = values in this interval not displayed. Recent Labs     03/15/22  0308 03/16/22  0502 03/17/22  0422    145 142   K 3.5* 3.3* 3.8    110* 111*   CO2 25 26 22   BUN 33* 23 22   CREATININE 0.8 0.7 0.7   CALCIUM 8.2* 8.0* 7.6*       No results for input(s): PROT, ALB, ALKPHOS, ALT, AST, BILITOT, AMYLASE, LIPASE in the last 72 hours. Recent Labs     03/15/22  2112   INR 1.1       No results for input(s): Roberto Tavarez in the last 72 hours.     Chronic labs:  Lab Results   Component Value Date    CHOL 156 07/15/2020    TRIG 77 07/15/2020    HDL 40 07/15/2020    TSH 0.147 (L) 07/15/2020    INR 1.1 03/15/2022       Radiology:  Imaging studies reviewed today. ASSESSMENT:    Principal Problem:    Other fracture of left femur, initial encounter for closed fracture ([address]  Active Problems:    Dementia without behavioral disturbance (HCC)    Hypothyroidism    Hypotension  Resolved Problems:    * No resolved hospital problems. *       PLAN:    Admit for Left periprosthetic femoral shaft fracture  Pain control  Orthopedic consult appreciated  Patient may proceed to surgery without further risk stratification    Anemia- normocytic  Status post 1 unit PRBC intraoperatively  -Monitor hemoglobin transfuse for less than 7  Reviewed iron studies, B12, folate    Hypokalemia  Monitor  Replace potassium as needed    Dementia   At increased risk of delirium   Avoid sedating and anticholinergic medications   Minimize opioid analgesia   continue memantine    Hypotension  Increase midodrine, continue IVF, IVF bolus  Check TSH and cortisol levels  Monitor hemoglobin transfuse as needed      Medications for other co morbidities cont as appropriate w dosage adjustments as necessary  PT/OT  DVT PPx  DC planning        Discussed with sameer Lopes-discussed diagnosis prognosis and plan of care including perioperative risks acutely and subacutely. All questions answered. Diet: ADULT DIET; Regular  Code Status: DNR-CCA  PT/OT Eval Status:   Ordered  DVT Prophylaxis:   Per orthopedics  Recommended disposition at discharge:   SNF    +++++++++++++++++++++++++++++++++++++++++++++++++  Jose A Hansen MD   [facility].  +++++++++++++++++++++++++++++++++++++++++++++++++  NOTE: This report was transcribed using voice recognition software. Every effort was made to ensure accuracy; however, inadvertent computerized transcription errors may be present.

## 2022-03-18 LAB
ANION GAP SERPL CALCULATED.3IONS-SCNC: 8 MMOL/L (ref 7–16)
BUN BLDV-MCNC: 22 MG/DL (ref 6–23)
CALCIUM SERPL-MCNC: 7.7 MG/DL (ref 8.6–10.2)
CHLORIDE BLD-SCNC: 115 MMOL/L (ref 98–107)
CO2: 23 MMOL/L (ref 22–29)
CREAT SERPL-MCNC: 0.7 MG/DL (ref 0.7–1.2)
GFR AFRICAN AMERICAN: >60
GFR NON-AFRICAN AMERICAN: >60 ML/MIN/1.73
GLUCOSE BLD-MCNC: 99 MG/DL (ref 74–99)
HCT VFR BLD CALC: 27 % (ref 37–54)
HEMOGLOBIN: 8.7 G/DL (ref 12.5–16.5)
MCH RBC QN AUTO: 31.4 PG (ref 26–35)
MCHC RBC AUTO-ENTMCNC: 32.2 % (ref 32–34.5)
MCV RBC AUTO: 97.5 FL (ref 80–99.9)
PDW BLD-RTO: 16.7 FL (ref 11.5–15)
PLATELET # BLD: 309 E9/L (ref 130–450)
PMV BLD AUTO: 11.2 FL (ref 7–12)
POTASSIUM SERPL-SCNC: 3.4 MMOL/L (ref 3.5–5)
RBC # BLD: 2.77 E12/L (ref 3.8–5.8)
SODIUM BLD-SCNC: 146 MMOL/L (ref 132–146)
T4 FREE: 0.79 NG/DL (ref 0.93–1.7)
WBC # BLD: 11.3 E9/L (ref 4.5–11.5)

## 2022-03-18 PROCEDURE — 87324 CLOSTRIDIUM AG IA: CPT

## 2022-03-18 PROCEDURE — 84439 ASSAY OF FREE THYROXINE: CPT

## 2022-03-18 PROCEDURE — 2060000000 HC ICU INTERMEDIATE R&B

## 2022-03-18 PROCEDURE — 87635 SARS-COV-2 COVID-19 AMP PRB: CPT

## 2022-03-18 PROCEDURE — 87449 NOS EACH ORGANISM AG IA: CPT

## 2022-03-18 PROCEDURE — 6370000000 HC RX 637 (ALT 250 FOR IP): Performed by: INTERNAL MEDICINE

## 2022-03-18 PROCEDURE — 6370000000 HC RX 637 (ALT 250 FOR IP): Performed by: STUDENT IN AN ORGANIZED HEALTH CARE EDUCATION/TRAINING PROGRAM

## 2022-03-18 PROCEDURE — 2580000003 HC RX 258: Performed by: STUDENT IN AN ORGANIZED HEALTH CARE EDUCATION/TRAINING PROGRAM

## 2022-03-18 PROCEDURE — 6360000002 HC RX W HCPCS: Performed by: STUDENT IN AN ORGANIZED HEALTH CARE EDUCATION/TRAINING PROGRAM

## 2022-03-18 PROCEDURE — 80048 BASIC METABOLIC PNL TOTAL CA: CPT

## 2022-03-18 PROCEDURE — 85027 COMPLETE CBC AUTOMATED: CPT

## 2022-03-18 PROCEDURE — 36415 COLL VENOUS BLD VENIPUNCTURE: CPT

## 2022-03-18 RX ORDER — COLCHICINE 0.6 MG/1
0.6 TABLET ORAL DAILY
Qty: 30 TABLET | Refills: 3 | DISCHARGE
Start: 2022-03-18

## 2022-03-18 RX ADMIN — MIRTAZAPINE 7.5 MG: 15 TABLET, FILM COATED ORAL at 21:14

## 2022-03-18 RX ADMIN — COLCHICINE 0.6 MG: 0.6 TABLET ORAL at 08:24

## 2022-03-18 RX ADMIN — ENOXAPARIN SODIUM 40 MG: 100 INJECTION SUBCUTANEOUS at 11:56

## 2022-03-18 RX ADMIN — LATANOPROST 1 DROP: 50 SOLUTION OPHTHALMIC at 21:14

## 2022-03-18 RX ADMIN — DORZOLAMIDE HYDROCHLORIDE 2 DROP: 20 SOLUTION/ DROPS OPHTHALMIC at 08:28

## 2022-03-18 RX ADMIN — MIDODRINE HYDROCHLORIDE 10 MG: 10 TABLET ORAL at 11:56

## 2022-03-18 RX ADMIN — MEMANTINE HYDROCHLORIDE 10 MG: 10 TABLET, FILM COATED ORAL at 08:23

## 2022-03-18 RX ADMIN — POTASSIUM CHLORIDE 40 MEQ: 20 TABLET, EXTENDED RELEASE ORAL at 12:58

## 2022-03-18 RX ADMIN — SODIUM CHLORIDE: 9 INJECTION, SOLUTION INTRAVENOUS at 21:16

## 2022-03-18 RX ADMIN — MIDODRINE HYDROCHLORIDE 10 MG: 10 TABLET ORAL at 17:28

## 2022-03-18 RX ADMIN — SODIUM CHLORIDE, PRESERVATIVE FREE 10 ML: 5 INJECTION INTRAVENOUS at 21:14

## 2022-03-18 RX ADMIN — MIDODRINE HYDROCHLORIDE 10 MG: 10 TABLET ORAL at 08:24

## 2022-03-18 RX ADMIN — TAMSULOSIN HYDROCHLORIDE 0.4 MG: 0.4 CAPSULE ORAL at 08:23

## 2022-03-18 RX ADMIN — MEMANTINE HYDROCHLORIDE 10 MG: 10 TABLET, FILM COATED ORAL at 21:14

## 2022-03-18 RX ADMIN — LEVOTHYROXINE SODIUM 125 MCG: 0.07 TABLET ORAL at 06:50

## 2022-03-18 RX ADMIN — ALLOPURINOL 100 MG: 100 TABLET ORAL at 08:24

## 2022-03-18 ASSESSMENT — PAIN SCALES - GENERAL
PAINLEVEL_OUTOF10: 1
PAINLEVEL_OUTOF10: 0
PAINLEVEL_OUTOF10: 1
PAINLEVEL_OUTOF10: 0

## 2022-03-18 ASSESSMENT — PAIN DESCRIPTION - ORIENTATION
ORIENTATION: LEFT
ORIENTATION: RIGHT;LEFT

## 2022-03-18 ASSESSMENT — PAIN DESCRIPTION - LOCATION
LOCATION: LEG;HIP
LOCATION: LEG

## 2022-03-18 ASSESSMENT — PAIN DESCRIPTION - PAIN TYPE
TYPE: ACUTE PAIN
TYPE: SURGICAL PAIN

## 2022-03-18 ASSESSMENT — PAIN DESCRIPTION - DESCRIPTORS: DESCRIPTORS: ACHING;DISCOMFORT

## 2022-03-18 NOTE — PLAN OF CARE
Problem: Pain:  Goal: Pain level will decrease  Description: Pain level will decrease  Outcome: Ongoing     Problem: Falls - Risk of:  Goal: Will remain free from falls  Description: Will remain free from falls  Outcome: Ongoing  Goal: Absence of physical injury  Description: Absence of physical injury  Outcome: Ongoing     Problem: Skin Integrity:  Goal: Will show no infection signs and symptoms  Description: Will show no infection signs and symptoms  Outcome: Ongoing  Goal: Absence of new skin breakdown  Description: Absence of new skin breakdown  Outcome: Ongoing

## 2022-03-18 NOTE — CARE COORDINATION
Notified [facility] patient likely stable for discharge if auth obtained. Ambulance on soft chart. Precert remains pending. Called to arrange transport in anticipation of approval later today from insurance. [facility] Shahid unavailable. Called and attempted to schedule physicians for 6pm, they said next available around that time is 8pm. Will call and cancel if auth not obtained. Auth obtained for [facility]. [email or web address] to see if they had transport available which they did not. Transport with physicians kept for 8pm. Son notified of discharge time. Facility notified of discharge time. Will need covid resulted prior to discharge. Discharge held for multiple BM. Message left regard discharge held with son, facility has Joselyn Thompson that is good through the weekend, they are aware of discharge being held. Physicians ambulance cancelled. For questions I can be reached at [phone].  Anastasia Mason, [facility]

## 2022-03-18 NOTE — PROGRESS NOTES
Department of Orthopedic Surgery  Resident Progress Note    Patient seen and examined. He has pain to the operative extremity although it is improved compared to yesterday. No additional complaints, including no shortness of breath, no nausea or vomiting. No fevers or chills. No changes in sensation. VITALS:  BP (!) 94/56   Pulse 70   Temp 98.1 °F (36.7 °C) (Temporal)   Resp 20   Ht 5' 8\" (1.727 m)   Wt 140 lb (63.5 kg)   SpO2 94%   BMI 21.29 kg/m²     General: alert and in no acute distress    MUSCULOSKELETAL:   left lower extremity:  · Dressing C/D/I  · Hinged ROM brace in place  · Compartments soft and compressible  · +PF/DF/EHL  · +2/4 DP & PT pulses, Brisk Cap refill, Toes warm and perfused  · Distal sensation grossly intact to Peroneals, Sural, Saphenous, and tibial nrs    CBC:   Lab Results   Component Value Date    WBC 11.3 03/18/2022    HGB 8.7 03/18/2022    HCT 27.0 03/18/2022     03/18/2022     PT/INR:    Lab Results   Component Value Date    PROTIME 12.4 03/15/2022    INR 1.1 03/15/2022           ASSESSMENT  · S/P open reduction internal fixation of left periprosthetic femur fracture on 3/17/2022    PLAN      · Continue physical therapy and protocol: NWB - LLE maintain hinged knee brace 0-30  · 24 hour abx coverage completed  · Deep venous thrombosis prophylaxis - Lovenox 40 mg in hospital, early mobilization  · PT/OT  · Pain Control: IV and PO  · Monitor H&H, 7.8, currently asymptomatic in bed  · D/C Plan:   Dispo per admitting service. Ok to discharge from orthopedic standpoint once appropriate discharge plan is in place. Orthopedics will follow the patient peripherally for the remainder of their inpatient stay. Please call with questions or concerns. Orthopaedic Attending    I have seen and evaluated the patient with the resident and agree with the above assessments on today's visit.  I have performed the key components of the history and physical examination and concur completely with the findings and plans as documented above.     Electronically signed by   Matilda Kaufman DO  3/18/2022

## 2022-03-18 NOTE — DISCHARGE INSTR - COC
Continuity of Care Form    Patient Name: Janina Monte   :  1934  MRN:  [id number]    [facility] date:  3/15/2022  Discharge date:  3/19/2022    Code Status Order: DNR-CCA   Advance Directives:      Admitting Physician:  Lima Rojas DO  PCP: Benjie Olvera DO    Discharging Nurse: Claudia Mccullough Unit/Room#: 6831/9515-P  Discharging Unit Phone Number: [phone]    Emergency Contact:   Extended Emergency Contact Information  Primary Emergency Contact: Moses Cantor  Address: [address] , [address] [place] [address] Phone: [phone]  Work Phone: [phone]  Mobile Phone: [phone]  Relation: Child  Preferred language: English   needed? No  Secondary Emergency Contact: Madeline River, [address] [facility] Phone: [phone]  Mobile Phone: [phone]  Relation: Child  Preferred language: English   needed? No    Past Surgical History:  Past Surgical History:   Procedure Laterality Date    FEMUR FRACTURE SURGERY Left 3/16/2022    LEFT FEMUR PERIPROSTHETIC FRACTURE OPEN REDUCTION INTERNAL FIXATION performed by Alejandra Acevedo DO at [address] [place] [address]       Immunization History: There is no immunization history on file for this patient.     Active Problems:  Patient Active Problem List   Diagnosis Code    Continuous severe abdominal pain R10.9    Severe lumbar pain M54.50    Constipation K59.00    History of small bowel obstruction Z87.19    History of compression fracture of spine Z87.81    Dementia without behavioral disturbance (HCC) F03.90    Hypothyroidism E03.9    Diverticulitis K57.92    Hypotension I95.9    Osteoporosis M81.0    Other fracture of left femur, initial encounter for closed fracture ([facility] [address] S72.8X2A       Isolation/Infection:   Isolation            No Isolation          Patient Infection Status       None to display            Nurse Assessment:  Last Vital Signs: BP 95/69   Pulse 110   Temp 98.2 °F (36.8 °C) (Oral) Resp 16   Ht 5' 8\" (1.727 m)   Wt 140 lb (63.5 kg)   SpO2 94%   BMI 21.29 kg/m²     Last documented pain score (0-10 scale): Pain Level: 1  Last Weight:   Wt Readings from Last 1 Encounters:   03/15/22 140 lb (63.5 kg)     Mental Status:  alert, with intermittent confusion    IV Access:  - None    Nursing Mobility/ADLs:  Walking   Assisted  Transfer  Assisted  Bathing  Dependent  Dressing  Dependent  Toileting  Assisted  Feeding  Independent after set-up  Med Admin  Assisted  Med Delivery   whole    Wound Care Documentation and Therapy: Leave operative dressing intact for 1 week, then just peel off, & apply dry dressing as needed for drainage        Elimination:  Continence: Bowel: Yes  Bladder: Yes  Urinary Catheter: Removal Date 3/17/2022    Colostomy/Ileostomy/Ileal Conduit: No       Date of Last BM: 3/19/2022    Intake/Output Summary (Last 24 hours) at 3/18/2022 1440  Last data filed at 3/18/2022 1053  Gross per 24 hour   Intake 1748.13 ml   Output 200 ml   Net 1548.13 ml     I/O last 3 completed shifts: In: 3684.1 [P.O.:520;  I.V.:3164.1]  Out: 790 [Urine:790]    Safety Concerns:     History of Falls (last 30 days) and At Risk for Falls    Impairments/Disabilities:      Limited movement left leg , visual    Nutrition Therapy:  Current Nutrition Therapy:   - Oral Diet:  General    Routes of Feeding: Oral  Liquids: No Restrictions  Daily Fluid Restriction: no  Last Modified Barium Swallow with Video (Video Swallowing Test): not done    Treatments at the Time of Hospital Discharge:   Respiratory Treatments: None  Oxygen Therapy:   Room air  Ventilator:    - No ventilator support    Rehab Therapies: Physical Therapy and Occupational Therapy  Weight Bearing Status/Restrictions: Non-weight bearing on left leg  Other Medical Equipment (for information only, NOT a DME order):  walker and hospital bed  Other Treatments: Hinged knee brace set at 0-30    Patient's personal belongings (please select all that are sent with patient):  Glasses, socks, shirt    RN SIGNATURE:  Electronically signed by Marcel Meeks, RN on 3/19/22 at 12:43 PM EDT    CASE MANAGEMENT/SOCIAL WORK SECTION    Inpatient Status Date: ***    Readmission Risk Assessment Score:  Readmission Risk              Risk of Unplanned Readmission:  13           Discharging to Facility/ Agency   Name:   Address:  Phone:  Fax:    Dialysis Facility (if applicable)   Name:  Address:  Dialysis Schedule:  Phone:  Fax:    / signature: {Esignature:[id number]}    PHYSICIAN SECTION    Prognosis: {Prognosis:1240788571}    Condition at Discharge: [address] Patient Condition:397199702}    Rehab Potential (if transferring to Rehab): {Prognosis:3673186202}    Recommended Labs or Other Treatments After Discharge: ***    Physician Certification: I certify the above information and transfer of Rigoberto Gilmore  is necessary for the continuing treatment of the diagnosis listed and that he requires {Admit to Appropriate Level of Care:90900} for {GREATER/LESS:621126802} 30 days.      Update Admission H&P: {CHP DME Changes in DIUXU:364915935}    PHYSICIAN SIGNATURE:  Electronically signed by Kendall Bustillo MD on 3/18/22 at 2:40 PM EDT

## 2022-03-18 NOTE — PROGRESS NOTES
Hospitalist Progress Note      Synopsis: Patient admitted on 3/15/2022 80 y.o. male who presents with the chief complaint of left leg pain. Patient is a transfer from [facility]. He is a resident of [address] [place] [address] as [place]. Yesterday he was reported noticed to have swelling of his left leg. He was taken for x-ray and found to have a left femur fracture. There was no fall that was witnessed and they believe the patient got himself back into his chair. Patient is unsure of what happened but states he thinks he fell. He does ambulate short distances to transfer with a cane. He is unsure if he has had a loss in consciousness. 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 He states he has had several falls. Denies numbness/tingling/paresthesias. Denies any other orthopedic complaints at this time. Subjective    Clinically improving. Feeling better. Notes some lightheadedness. Denies any black or bloody stools. Stable overnight. No other overnight issues reported. No CP, SOB, palpitations, blurred vision, HA, lightheadedness, LOC or focal neurological deficits    Exam:  BP 98/68   Pulse 110   Temp 98.2 °F (36.8 °C) (Oral)   Resp 16   Ht 5' 8\" (1.727 m)   Wt 140 lb (63.5 kg)   SpO2 94%   BMI 21.29 kg/m²   General appearance: No apparent distress, appears stated age and cooperative. HEENT: Pupils equal, round, and reactive to light. Conjunctivae/corneas clear. Neck: Supple. No jugular venous distention. Trachea midline. Respiratory:  Normal respiratory effort. Clear to auscultation, bilaterally without Rales/Wheezes/Rhonchi. Cardiovascular: Regular rate and rhythm with normal S1/S2 without murmurs, rubs or gallops. Abdomen: Soft, non-tender, non-distended with normal bowel sounds. Musculoskeletal: No clubbing, cyanosis or edema bilaterally. Brisk capillary refill. 2+ lower extremity pulses (dorsalis pedis).    Skin:  No rashes incision CDI, dressed  Neurologic: awake, alert and following commands     Medications:  Reviewed    Infusion Medications    sodium chloride 75 mL/hr at 03/17/22 2019     Scheduled Medications    midodrine  10 mg Oral TID WC    enoxaparin  40 mg SubCUTAneous Daily    tamsulosin  0.4 mg Oral Daily    allopurinol  100 mg Oral Daily    latanoprost  1 drop Both Eyes Nightly    colchicine  0.6 mg Oral TID    dorzolamide  2 drop Both Eyes Daily    levothyroxine  125 mcg Oral Daily    memantine  10 mg Oral BID    mirtazapine  7.5 mg Oral Nightly    sodium chloride flush  10 mL IntraVENous 2 times per day    [START ON 3/21/2022] vitamin D  50,000 Units Oral Weekly     PRN Meds: sodium chloride flush, promethazine **OR** ondansetron, polyethylene glycol, acetaminophen **OR** acetaminophen, potassium chloride **OR** potassium alternative oral replacement **OR** potassium chloride, magnesium sulfate, fentanNYL    I/O    Intake/Output Summary (Last 24 hours) at 3/18/2022 1134  Last data filed at 3/18/2022 1053  Gross per 24 hour   Intake 2238.13 ml   Output 450 ml   Net 1788.13 ml       Labs:   Recent Labs     03/16/22  0502 03/16/22  0502 03/16/22  1447 03/17/22  0422 03/18/22 0427   WBC 7.8  --   --  9.7 11.3   HGB 7.6*   < > 8.1* 7.8* 8.7*   HCT 24.8*   < > 25.3* 24.2* 27.0*     --   --  261 309    < > = values in this interval not displayed. Recent Labs     03/16/22  0502 03/17/22  0422 03/18/22 0427    142 146   K 3.3* 3.8 3.4*   * 111* 115*   CO2 26 22 23   BUN 23 22 22   CREATININE 0.7 0.7 0.7   CALCIUM 8.0* 7.6* 7.7*       No results for input(s): PROT, ALB, ALKPHOS, ALT, AST, BILITOT, AMYLASE, LIPASE in the last 72 hours. Recent Labs     03/15/22  2112   INR 1.1       No results for input(s): Donna Mendes in the last 72 hours.     Chronic labs:  Lab Results   Component Value Date    CHOL 156 07/15/2020    TRIG 77 07/15/2020    HDL 40 07/15/2020    TSH 13.460 (H) 03/17/2022    INR 1.1 03/15/2022       Radiology:  Imaging studies reviewed today. ASSESSMENT:    Principal Problem:    Other fracture of left femur, initial encounter for closed fracture ([address]  Active Problems:    Dementia without behavioral disturbance (HCC)    Hypothyroidism    Hypotension  Resolved Problems:    * No resolved hospital problems. *       PLAN:    Admit for Left periprosthetic femoral shaft fracture  Pain control  Orthopedic consult appreciated  Patient may proceed to surgery without further risk stratification    Anemia- normocytic  Status post 1 unit PRBC intraoperatively  -Monitor hemoglobin transfuse for less than 7  Reviewed iron studies, B12, folate    Hypokalemia  Monitor  Replace potassium as needed    Dementia   At increased risk of delirium   Avoid sedating and anticholinergic medications   Minimize opioid analgesia   continue memantine    Hypotension  Increase midodrine, continue IVF, IVF bolus  Check TSH and cortisol levels  Monitor hemoglobin transfuse as needed    Diarrhea  Patient on high-dose colchicine-hold  If continued watery diarrhea check C. Difficile  Monitor volume status      Medications for other co morbidities cont as appropriate w dosage adjustments as necessary  PT/OT  DVT PPx  DC planning       Discussed with sameer Lopes-discussed diagnosis prognosis and plan of care including perioperative risks acutely and subacutely. All questions answered. Diet: ADULT DIET; Regular  Code Status: DNR-CCA  PT/OT Eval Status:   Ordered  DVT Prophylaxis:   Per orthopedics  Recommended disposition at discharge:   SNF pending bed-patient with diarrhea-likely DC tomorrow    +++++++++++++++++++++++++++++++++++++++++++++++++  Howie Srivastava MD   [facility].  +++++++++++++++++++++++++++++++++++++++++++++++++  NOTE: This report was transcribed using voice recognition software. Every effort was made to ensure accuracy; however, inadvertent computerized transcription errors may be present.

## 2022-03-19 VITALS
TEMPERATURE: 97.5 F | SYSTOLIC BLOOD PRESSURE: 98 MMHG | HEART RATE: 80 BPM | RESPIRATION RATE: 18 BRPM | DIASTOLIC BLOOD PRESSURE: 61 MMHG | WEIGHT: 140 LBS | OXYGEN SATURATION: 95 % | BODY MASS INDEX: 21.22 KG/M2 | HEIGHT: 68 IN

## 2022-03-19 LAB
ANION GAP SERPL CALCULATED.3IONS-SCNC: 10 MMOL/L (ref 7–16)
BLOOD BANK DISPENSE STATUS: NORMAL
BLOOD BANK PRODUCT CODE: NORMAL
BPU ID: NORMAL
BUN BLDV-MCNC: 14 MG/DL (ref 6–23)
C DIFF TOXIN/ANTIGEN: NORMAL
CALCIUM SERPL-MCNC: 7.5 MG/DL (ref 8.6–10.2)
CHLORIDE BLD-SCNC: 113 MMOL/L (ref 98–107)
CO2: 20 MMOL/L (ref 22–29)
CREAT SERPL-MCNC: 0.6 MG/DL (ref 0.7–1.2)
DESCRIPTION BLOOD BANK: NORMAL
GFR AFRICAN AMERICAN: >60
GFR NON-AFRICAN AMERICAN: >60 ML/MIN/1.73
GLUCOSE BLD-MCNC: 78 MG/DL (ref 74–99)
HCT VFR BLD CALC: 25.2 % (ref 37–54)
HEMOGLOBIN: 7.9 G/DL (ref 12.5–16.5)
MCH RBC QN AUTO: 31 PG (ref 26–35)
MCHC RBC AUTO-ENTMCNC: 31.3 % (ref 32–34.5)
MCV RBC AUTO: 98.8 FL (ref 80–99.9)
PDW BLD-RTO: 17.2 FL (ref 11.5–15)
PLATELET # BLD: 260 E9/L (ref 130–450)
PMV BLD AUTO: 10.7 FL (ref 7–12)
POTASSIUM SERPL-SCNC: 3.2 MMOL/L (ref 3.5–5)
RBC # BLD: 2.55 E12/L (ref 3.8–5.8)
SARS-COV-2, NAAT: NOT DETECTED
SODIUM BLD-SCNC: 143 MMOL/L (ref 132–146)
WBC # BLD: 9.8 E9/L (ref 4.5–11.5)

## 2022-03-19 PROCEDURE — 6370000000 HC RX 637 (ALT 250 FOR IP): Performed by: STUDENT IN AN ORGANIZED HEALTH CARE EDUCATION/TRAINING PROGRAM

## 2022-03-19 PROCEDURE — 6360000002 HC RX W HCPCS: Performed by: STUDENT IN AN ORGANIZED HEALTH CARE EDUCATION/TRAINING PROGRAM

## 2022-03-19 PROCEDURE — 80048 BASIC METABOLIC PNL TOTAL CA: CPT

## 2022-03-19 PROCEDURE — 36415 COLL VENOUS BLD VENIPUNCTURE: CPT

## 2022-03-19 PROCEDURE — L1832 KO ADJ JNT POS R SUP PRE CST: HCPCS

## 2022-03-19 PROCEDURE — 85027 COMPLETE CBC AUTOMATED: CPT

## 2022-03-19 PROCEDURE — 6370000000 HC RX 637 (ALT 250 FOR IP): Performed by: INTERNAL MEDICINE

## 2022-03-19 RX ORDER — FERROUS SULFATE 325(65) MG
325 TABLET ORAL 2 TIMES DAILY WITH MEALS
Status: DISCONTINUED | OUTPATIENT
Start: 2022-03-19 | End: 2022-03-19 | Stop reason: HOSPADM

## 2022-03-19 RX ORDER — FERROUS SULFATE 325(65) MG
325 TABLET ORAL 2 TIMES DAILY WITH MEALS
Qty: 30 TABLET | Refills: 0 | DISCHARGE
Start: 2022-03-19

## 2022-03-19 RX ORDER — MIDODRINE HYDROCHLORIDE 10 MG/1
10 TABLET ORAL
DISCHARGE
Start: 2022-03-19

## 2022-03-19 RX ADMIN — TAMSULOSIN HYDROCHLORIDE 0.4 MG: 0.4 CAPSULE ORAL at 10:01

## 2022-03-19 RX ADMIN — FERROUS SULFATE TAB 325 MG (65 MG ELEMENTAL FE) 325 MG: 325 (65 FE) TAB at 13:14

## 2022-03-19 RX ADMIN — ENOXAPARIN SODIUM 40 MG: 100 INJECTION SUBCUTANEOUS at 13:14

## 2022-03-19 RX ADMIN — DORZOLAMIDE HYDROCHLORIDE 2 DROP: 20 SOLUTION/ DROPS OPHTHALMIC at 10:01

## 2022-03-19 RX ADMIN — MIDODRINE HYDROCHLORIDE 10 MG: 10 TABLET ORAL at 10:01

## 2022-03-19 RX ADMIN — POTASSIUM CHLORIDE 40 MEQ: 20 TABLET, EXTENDED RELEASE ORAL at 10:01

## 2022-03-19 RX ADMIN — MEMANTINE HYDROCHLORIDE 10 MG: 10 TABLET, FILM COATED ORAL at 10:00

## 2022-03-19 RX ADMIN — LEVOTHYROXINE SODIUM 125 MCG: 0.07 TABLET ORAL at 05:51

## 2022-03-19 RX ADMIN — MIDODRINE HYDROCHLORIDE 10 MG: 10 TABLET ORAL at 13:14

## 2022-03-19 RX ADMIN — ALLOPURINOL 100 MG: 100 TABLET ORAL at 10:01

## 2022-03-19 ASSESSMENT — PAIN SCALES - GENERAL
PAINLEVEL_OUTOF10: 0
PAINLEVEL_OUTOF10: 0

## 2022-03-20 NOTE — PROGRESS NOTES
Hospitalist Progress Note      Synopsis: Patient admitted on 3/15/2022 80 y.o. male who presents with the chief complaint of left leg pain. Patient is a transfer from [place]. He is a resident of [facility] as [place]. Yesterday he was reported noticed to have swelling of his left leg. He was taken for x-ray and found to have a left femur fracture. There was no fall that was witnessed and they believe the patient got himself back into his chair. Patient is unsure of what happened but states he thinks he fell. He does ambulate short distances to transfer with a cane. He is unsure if he has had a loss in consciousness. 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 He states he has had several falls. Denies numbness/tingling/paresthesias. Denies any other orthopedic complaints at this time. Subjective    Clinically improving. Feeling better. Notes some lightheadedness. Denies any black or bloody stools. Stable overnight. No other overnight issues reported. No CP, SOB, palpitations, blurred vision, HA, lightheadedness, LOC or focal neurological deficits    Exam:  BP 98/61   Pulse 80   Temp 97.5 °F (36.4 °C) (Oral)   Resp 18   Ht 5' 8\" (1.727 m)   Wt 140 lb (63.5 kg)   SpO2 95%   BMI 21.29 kg/m²   General appearance: No apparent distress, appears stated age and cooperative. HEENT: Pupils equal, round, and reactive to light. Conjunctivae/corneas clear. Neck: Supple. No jugular venous distention. Trachea midline. Respiratory:  Normal respiratory effort. Clear to auscultation, bilaterally without Rales/Wheezes/Rhonchi. Cardiovascular: Regular rate and rhythm with normal S1/S2 without murmurs, rubs or gallops. Abdomen: Soft, non-tender, non-distended with normal bowel sounds. Musculoskeletal: No clubbing, cyanosis or edema bilaterally. Brisk capillary refill. 2+ lower extremity pulses (dorsalis pedis).    Skin:  No rashes incision CDI, hemoglobin transfuse as needed    Diarrhea  Patient on high-dose colchicine-hold  If continued watery diarrhea check C. Difficile  Monitor volume status      Medications for other co morbidities cont as appropriate w dosage adjustments as necessary  PT/OT  DVT PPx  DC planning       Discussed with sameer Lopes-discussed diagnosis prognosis and plan of care including perioperative risks acutely and subacutely. All questions answered. Diet: No diet orders on file  Code Status: Prior  PT/OT Eval Status:   Ordered  DVT Prophylaxis:   Per orthopedics  Recommended disposition at discharge:   SNF  DC today    +++++++++++++++++++++++++++++++++++++++++++++++++  Zahra Aleman MD   [facility].  +++++++++++++++++++++++++++++++++++++++++++++++++  NOTE: This report was transcribed using voice recognition software. Every effort was made to ensure accuracy; however, inadvertent computerized transcription errors may be present.

## 2022-03-20 NOTE — DISCHARGE SUMMARY
Physician Discharge Summary     Patient ID:  Artur Manrique  [id number]  80 y.o.  1934    Admit date: 3/15/2022    Discharge date and time:  3/20/2022    Discharge Diagnoses: Principal Problem:    Other fracture of left femur, initial encounter for closed fracture [facility])  Active Problems:    Dementia without behavioral disturbance ([address]    Hypothyroidism    Hypotension  Resolved Problems:    * No resolved hospital problems. *      Consults: IP CONSULT TO ORTHOPEDIC SURGERY  INPATIENT CONSULT TO ORTHOTIST/PROSTHETIST    Procedures: See below    Hospital Course: Patient admitted on 3/15/2022 80 y. o. male who presents with the chief complaint of left leg pain.  Patient is a transfer [facility] is a resident of [address] [place] [address] as [address] [place] [address] he was reported noticed to have swelling of his left leg.  He was taken for x-ray and found to have a left femur fracture. [place] was no fall that was witnessed and they believe the patient got himself back into his chair. Sienna Harris is unsure of what happened but states he thinks he fell. Mariusz Bynum does ambulate short distances to transfer with a cane. Mariusz Bynum is unsure if he has had a loss in consciousness. Mariusz Bynum does have a history of left total knee arthroplasty as well as left hemihip arthroplasty. Mariusz Bynum states he has had several falls.  Denies numbness/tingling/paresthesias.  Denies any other orthopedic complaints at this time.     Admit for Left periprosthetic femoral shaft fracture  Pain control  Orthopedic consult appreciated  Patient may proceed to surgery without further risk stratification     Anemia- normocytic  Status post 1 unit PRBC intraoperatively  -Monitor hemoglobin transfuse for less than 7  Reviewed iron studies, B12, folate     Hypokalemia  Monitor  Replace potassium as needed     Dementia   At increased risk of delirium   Avoid sedating and anticholinergic medications   Minimize opioid analgesia   continue memantine     Hypotension  Increase midodrine, continue IVF, IVF bolus  Check TSH and cortisol levels  Monitor hemoglobin transfuse as needed     Diarrhea  Patient on high-dose colchicine-hold  If continued watery diarrhea check C. Difficile  Monitor volume status        Medications for other co morbidities cont as appropriate w dosage adjustments as necessary  PT/OT  DVT PPx  DC planning        Discharge Exam:  See progress note from today    Condition:  Stable    Disposition: [facility]    Patient Instructions:   Discharge Medication List as of 3/19/2022 12:54 PM      START taking these medications    Details   ferrous sulfate (IRON 325) 325 (65 Fe) MG tablet Take 1 tablet by mouth 2 times daily (with meals), Disp-30 tablet, R-0DC to SNF      midodrine (PROAMATINE) 10 MG tablet Take 1 tablet by mouth 3 times daily (with meals) For blood pressure systolic greater than 272JV to SNF      oxyCODONE-acetaminophen (PERCOCET) 5-325 MG per tablet Take 1 tablet by mouth every 6 hours as needed for Pain for up to 7 days. Intended supply: 7 days.  Take lowest dose possible to manage pain, Disp-28 tablet, R-0Print      aspirin 325 MG EC tablet Take 1 tablet by mouth 2 times daily for 28 days, Disp-56 tablet, R-0Print         CONTINUE these medications which have CHANGED    Details   colchicine (COLCRYS) 0.6 MG tablet Take 1 tablet by mouth daily, Disp-30 tablet, R-3DC to [facility]         CONTINUE these medications which have NOT CHANGED    Details   allopurinol (ZYLOPRIM) 100 MG tablet Take 100 mg by mouth dailyHistorical Med      bimatoprost (LUMIGAN) 0.01 % SOLN ophthalmic drops Place 1 drop into both eyes nightlyHistorical Med      Cholecalciferol (VITAMIN D) 10 MCG (400 UNIT) CAPS Capsule Take 50,000 Units by mouth dailyHistorical Med      dorzolamide (TRUSOPT) 2 % ophthalmic solution Place 2 drops into both eyes dailyHistorical Med      levothyroxine (SYNTHROID) 125 MCG tablet Take 125 mcg by mouth DailyHistorical Med      memantine ER (NAMENDA XR) 28 MG CP24 extended release capsule Take 28 mg by mouth dailyHistorical Med      tamsulosin (FLOMAX) 0.4 MG capsule Take 0.8 mg by mouth dailyHistorical Med      mirtazapine (REMERON) 15 MG tablet Take 7.5 mg by mouth nightlyHistorical Med      Menthol, Topical Analgesic, (BIOFREEZE) 4 % GEL Apply topically twice daily as needed for pain., Disp-89 mL,R-5Normal         STOP taking these medications       cyclobenzaprine (FLEXERIL) 10 MG tablet Comments:   Reason for Stopping:         potassium chloride (MICRO-K) 10 MEQ extended release capsule Comments:   Reason for Stopping:         acetaminophen (TYLENOL) 650 MG extended release tablet Comments:   Reason for Stopping:         vitamin D (ERGOCALCIFEROL) 1.25 MG (20433 UT) CAPS capsule Comments:   Reason for Stopping:             Activity: activity as tolerated  Diet: regular diet    Follow-up with 1wk PCP    Note that over 30 minutes was spent in preparing discharge papers, discussing discharge with patient, staff, consultants, medication review, arranging follow up, etc.    Signed:  Kennedy Rabago MD  3/20/2022  7:26 AM

## 2022-03-20 NOTE — PROGRESS NOTES
Electronic SHANNON completed. Nurse to nurse report called to Sky Mensah at [facility].

## 2022-03-21 ENCOUNTER — OUTSIDE SERVICES (OUTPATIENT)
Dept: FAMILY MEDICINE CLINIC | Age: 87
End: 2022-03-21
Payer: MEDICARE

## 2022-03-21 DIAGNOSIS — S72.25XS CLOSED NONDISPLACED SUBTROCHANTERIC FRACTURE OF LEFT FEMUR, SEQUELA: Primary | ICD-10-CM

## 2022-03-21 DIAGNOSIS — F03.90 DEMENTIA WITHOUT BEHAVIORAL DISTURBANCE, UNSPECIFIED DEMENTIA TYPE: ICD-10-CM

## 2022-03-21 DIAGNOSIS — M81.0 OSTEOPOROSIS, UNSPECIFIED OSTEOPOROSIS TYPE, UNSPECIFIED PATHOLOGICAL FRACTURE PRESENCE: ICD-10-CM

## 2022-03-21 DIAGNOSIS — I10 HYPERTENSION, UNSPECIFIED TYPE: ICD-10-CM

## 2022-03-21 DIAGNOSIS — E03.9 HYPOTHYROIDISM, UNSPECIFIED TYPE: ICD-10-CM

## 2022-03-21 PROCEDURE — 99309 SBSQ NF CARE MODERATE MDM 30: CPT | Performed by: NURSE PRACTITIONER

## 2022-03-21 NOTE — CARE COORDINATION
Received call from [address] [place] [address] that [facility] or Exemption was not done for Pt. Informed that Other SW said that Pt was from there. Diana informed that Pt was from the Assisted Living part. Completed Hospital Exemption in [address].    QUINTON Felix.  [phone]

## 2022-03-21 NOTE — PROGRESS NOTES
3/21/2022    Wu Escobar  1934    This resident is being seen today for a follow-up evaluation visit. He is a resident who has long-term medical conditions including hypertension, hypothyroidism, dementia with some degree of cognitive decline, frequent falls vitamin D deficiency, gout, respiratory failure with hypoxia in times past, rib fractures to the right side, anxiety disorder, major depressive disorder, moderate protein calorie malnutrition, diverticulitis, low back pain,. He is a 80 y.o. male resident who is being seen today for a follow-up evaluation visit as this resident was recently readmitted to the facility after being sent to the hospital setting due to a assisted living home related fall. He was admitted with recommendations for skilled therapy services with regards to PT/OT with a recent fracture to the left femur status post ORIF with Dr. Ruben Aly. He does have a hinged knee brace intact. He states that his pain is controlled with respect to the left lower extremity. He denies any headaches or dizziness, sore throat, chest pain, coughing or shortness of breath, nausea or vomiting, constipation or diarrhea, dysuria or frequency, fever or chills, syncopal events or seizure activity. Medications:  Tamsulosin 0.8 mg daily  Vitamin D 50,000 units weekly  Levothyroxine 125 mcg daily  Midodrine 10 mg 3 times daily  Percocet 5-325 mg every 6 hours as needed  Iron 325 mg 2 times daily  Aspirin 325 mg 2 times daily until 4/16/2022  Trusopt solution 2% 2 drops to both eyes daily  Eliquis tablet 0.6 mg daily  Remeron 7.5 mg at bedtime  Namenda 28 mg daily  Zyloprim 100 mg daily  Lumigan 0.01% to both eyes at bedtime            Objective     Vital Signs: BP 91/56 pulse 77 respirations 18 temperature 97.3 O2 95% weight 139 pounds      Physical examination:Skin is essentially warm and dry. HEENT unremarkable. Neck is supple. Heart regular rate and rhythm. No rubs, gallops or murmurs noted. Lungs are clear to auscultation. No evidence of rhonchi, rales, or wheezing. Abdomen is soft and supple and non-tender. Bowel sounds are noted x4 quadrants. No rigidity, guarding or rebound tenderness. Negative Nelson's, negative McBurney's, negative Rajendra's. Extremities; no true pitting edema. Pulses are adequate. No clubbing  or no cyanosis noted. He does have some modest motion restriction with respect to the shoulders with abduction and extension. There is some degree of venous insufficiency to the lower extremities, without appreciable change. Ace wraps intact to the left lower extremity with a hinged knee brace. Neurologically he  is alert and oriented x2. No evidence of paralysis or paresthesias noted. Diagnoses and all orders for this visit:    Closed nondisplaced subtrochanteric fracture of left femur, sequela  Comments:  Status post ORIF per Dr. Ruben Aly with nonweightbearing status    Osteoporosis, unspecified osteoporosis type, unspecified pathological fracture presence  Comments:  Stable with vitamin D    Dementia without behavioral disturbance, unspecified dementia type ([facility] [address]  Comments:  Stable with low-dose aspirin with Namenda    Hypothyroidism, unspecified type  Comments:  Controlled with levothyroxine    Hypertension, unspecified type  Comments:  Recommendations given for midodrine due to history of hypotension         Plan: Plan of care was discussed with the healthcare team with meds and labs reviewed. Pain does appear to be controlled he does have the benefit of Percocet as needed. He does have recommendations to maintain the hinged knee brace with knee and hip range of motion and to follow-up with Dr. Ruben Aly in the course of 2 weeks. He does remain nonweightbearing to the left lower extremity. He will continue forth with current medical regimen as directed with the benefit of a regular diet.   I will continue to track his intakes, monitor his weights and behaviors, and see him routinely and as needed with further orders forthcoming. VIRGINIA BANDA, APRN - CNP      *Note was creating using voice recognition software.   The document was reviewed however grammatical errors may exist.

## 2022-03-29 DIAGNOSIS — S72.8X2A OTHER FRACTURE OF LEFT FEMUR, INITIAL ENCOUNTER FOR CLOSED FRACTURE (HCC): Primary | ICD-10-CM

## 2022-04-04 ENCOUNTER — HOSPITAL ENCOUNTER (OUTPATIENT)
Dept: GENERAL RADIOLOGY | Age: 87
Discharge: HOME | End: 2022-04-06
Payer: MEDICARE

## 2022-04-04 ENCOUNTER — OFFICE VISIT (OUTPATIENT)
Dept: ORTHOPEDIC SURGERY | Age: 87
End: 2022-04-04
Payer: MEDICARE

## 2022-04-04 DIAGNOSIS — S72.8X2A OTHER FRACTURE OF LEFT FEMUR, INITIAL ENCOUNTER FOR CLOSED FRACTURE (HCC): Primary | ICD-10-CM

## 2022-04-04 DIAGNOSIS — S72.8X2A OTHER FRACTURE OF LEFT FEMUR, INITIAL ENCOUNTER FOR CLOSED FRACTURE (HCC): ICD-10-CM

## 2022-04-04 PROCEDURE — 73552 X-RAY EXAM OF FEMUR 2/>: CPT

## 2022-04-04 PROCEDURE — 99212 OFFICE O/P EST SF 10 MIN: CPT

## 2022-04-04 PROCEDURE — 99024 POSTOP FOLLOW-UP VISIT: CPT | Performed by: PHYSICIAN ASSISTANT

## 2022-04-04 NOTE — PROGRESS NOTES
Asiya Sousa is a 80 y.o. male who presents for follow up of left femur    SURGEON: Dr. Harrington Postal, DO  Date of Injury/Surgery: 3/16/2022    Symptoms: better  New complaints: patient having mild aching    Weightbearing: left lower Non-weight bearing     Incision: Edges approximated      Assistive device Wheelchair and Hinged Knee brace  Participating in therapy (location if yes)?  no    Refills Needed: None  Order/Referral Needed: N/A

## 2022-04-04 NOTE — PATIENT INSTRUCTIONS
Continue nonweightbearing left lower extremity. Continue wearing hinged ROM knee brace increasing 10 degrees of flexion per week in PT. Okay to remove the brace for hygiene, therapy and skin checks. Aspirin 325 twice daily for DVT prophylaxis. Continue physical therapy at the facility. Follow-up in 4 weeks for reevaluation and x-rays. Call if any questions or concerns.

## 2022-04-04 NOTE — PROGRESS NOTES
Chief Complaint   Patient presents with    Post-Op Check     L femur ORIF 3/16/2022. Residing [facility]        OP:SURGEON: Dr. Donald Kulkarni DO  DATE OF PROCEDURE: 3-16-22  PROCEDURE: LEFT FEMUR PERIPROSTHETIC FRACTURE OPEN REDUCTION INTERNAL FIXATION    POD: 2 weeks    Subjective:  Caitlin Johns is a very pleasant 60-year-old male is following up from the above surgery. He is NWB on left lower extremity. He ambulates with assistive device, wheelchair. Pain to extremity is none and is not taking prescribed pain medication. They denies numbness or tingling to the left lower extremity. Denies calf pain, chest pain, or shortness of breath. Patient continues to use DVT prophylaxis,  mg BID. Patient is  participating in therapy at the skilled nursing facility. The patient is pleasantly confused with no family or caregivers here with him today. He denies any pain or problems in his left lower extremity. Review of Systems -  All pertinent positives/negative in HPI     Objective:    General: Alert and oriented X 3, normocephalic atraumatic, external ears and eye normal, sclera clear, no acute distress, respirations easy and unlabored with no audible wheezes, skin warm and dry, speech and dress appropriate for noted age, affect euthymic. Extremity:  Left Lower Extremity  Skin clean dry and intact, without signs of infection   Incision well-healed. Sutures were removed and Steri-Strips applied. no edema noted  Compartments supple throughout thigh and leg  Calf supple and not tender  negative Homans  Demonstrates active knee ROM 5-45 without pain  Presents in a wheelchair. States sensation intact to touch in sural, deep peroneal, superficial peroneal, saphenous, posterior tibial  nerve distributions to foot/ankle. Palpable dorsalis pedis and posterior tibialis pulses, cap refill brisk in toes, foot warm/perfused. There were no vitals taken for this visit.     XR:   2 views left femur demonstrate ORIF left femur periprosthetic fracture with the hardware in stable position and alignment. No evidence of hardware loosening or failure. Assessment:   Diagnosis Orders   1. Other fracture of left femur, subsequent encounter for closed fracture [facility])       Plan:  X-rays reviewed and discussed. Continue nonweightbearing left lower extremity. Continue wearing hinged ROM knee brace increasing 10 degrees of flexion per week in PT. Okay to remove the brace for hygiene, therapy and skin checks. Aspirin 325 twice daily for DVT prophylaxis. Continue physical therapy at the facility. Follow-up in 4 weeks for reevaluation and x-rays. Call if any questions or concerns. Electronically signed by ELIZABETH Jackson on 4/4/2022 at 8:17 AM  Note: This report was completed using [employer] voiced recognition software. Every effort has been made to ensure accuracy; however, inadvertent computerized transcription errors may be present.

## 2022-04-18 ENCOUNTER — OUTSIDE SERVICES (OUTPATIENT)
Dept: FAMILY MEDICINE CLINIC | Age: 87
End: 2022-04-18
Payer: MEDICARE

## 2022-04-18 DIAGNOSIS — E55.9 VITAMIN D DEFICIENCY: ICD-10-CM

## 2022-04-18 DIAGNOSIS — K59.00 CONSTIPATION, UNSPECIFIED CONSTIPATION TYPE: ICD-10-CM

## 2022-04-18 DIAGNOSIS — R97.20 ELEVATED PSA: ICD-10-CM

## 2022-04-18 DIAGNOSIS — D50.9 IRON DEFICIENCY ANEMIA, UNSPECIFIED IRON DEFICIENCY ANEMIA TYPE: ICD-10-CM

## 2022-04-18 DIAGNOSIS — E53.8 FOLATE DEFICIENCY: Primary | ICD-10-CM

## 2022-04-18 PROCEDURE — 99309 SBSQ NF CARE MODERATE MDM 30: CPT | Performed by: NURSE PRACTITIONER

## 2022-04-18 NOTE — PROGRESS NOTES
Neck is supple. Heart regular rate and rhythm. No rubs, gallops or murmurs noted. Lungs are clear to auscultation. No evidence of rhonchi, rales, or wheezing. Abdomen is soft and supple and non-tender. Bowel sounds are noted x4 quadrants. No rigidity, guarding or rebound tenderness. Negative Nelson's, negative McBurney's, negative Rajendra's. Extremities; no true pitting edema. Pulses are adequate. No clubbing  or no cyanosis noted. He does have some modest motion restriction with respect to the shoulders with abduction and extension. There is some degree of venous insufficiency to the lower extremities, without appreciable change. Ace wraps intact to the left lower extremity with a hinged knee brace. Neurologically he  is alert and oriented x2. No evidence of paralysis or paresthesias noted. Diagnoses and all orders for this visit:    Folate deficiency  Comments:  Initiate folic acid 808 mcg daily    Iron deficiency anemia, unspecified iron deficiency anemia type  Comments:  Maintain iron supplementation twice daily    Constipation, unspecified constipation type  Comments:  Currently controlled    Vitamin D deficiency  Comments:  Controlled with vitamin D supplementation    Elevated PSA  Comments:  Stable with observation of PSA         Plan: Plan of care was discussed with the healthcare team with meds and labs reviewed. Most recent blood work with a TSH of 38.603 with upward titration of the Synthroid given at that time iron of 49 TIBC 203% saturation 24 ferritin 450 H/H 73.9/27.2 folic acid 44 M25 676. Resident is currently on iron supplementation twice daily I am going to recommend folic acid 4 mg daily. I will plan to reevaluate his labs at a later date. He will furthermore continue with his pain management along with the benefit of therapy services and I will track his intakes, monitor his weights and behaviors, and see him routinely and as needed with further orders forthcoming.       Mo Mcconnell CHARITY PATINO - CNP      *Note was creating using voice recognition software.   The document was reviewed however grammatical errors may exist.

## 2022-04-25 ENCOUNTER — OUTSIDE SERVICES (OUTPATIENT)
Dept: FAMILY MEDICINE CLINIC | Age: 87
End: 2022-04-25
Payer: MEDICARE

## 2022-04-25 DIAGNOSIS — E03.9 HYPOTHYROIDISM, UNSPECIFIED TYPE: ICD-10-CM

## 2022-04-25 DIAGNOSIS — M81.0 OSTEOPOROSIS, UNSPECIFIED OSTEOPOROSIS TYPE, UNSPECIFIED PATHOLOGICAL FRACTURE PRESENCE: ICD-10-CM
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
## 2022-04-25 DIAGNOSIS — F03.90 DEMENTIA WITHOUT BEHAVIORAL DISTURBANCE, UNSPECIFIED DEMENTIA TYPE: ICD-10-CM

## 2022-04-25 DIAGNOSIS — I10 HYPERTENSION, UNSPECIFIED TYPE: ICD-10-CM

## 2022-04-25 PROCEDURE — 99309 SBSQ NF CARE MODERATE MDM 30: CPT | Performed by: NURSE PRACTITIONER

## 2022-04-25 NOTE — PROGRESS NOTES
4/25/2022    Leigh Sanches  1934    This resident is being seen today for an acute evaluation visit. He is a resident who has long-term medical conditions including hypertension, hypothyroidism, dementia with some degree of cognitive decline, frequent falls vitamin D deficiency, gout, respiratory failure with hypoxia in times past, rib fractures to the right side, anxiety disorder, major depressive disorder, moderate protein calorie malnutrition, diverticulitis, low back pain,. He is a 80 y.o. male resident who is being seen today for an acute evaluation visit secondary to a recent fall on 4/23/2022. Staff indicates that he had no apparent injuries noted at the time and that this has become more of a recurrent problem for this resident. He has recently been given the benefit of physical therapy as well as occupational therapy services. It is of note to mention that the at the time of the occurrence resident was found on the floor in his room on his buttocks with his wheelchair several feet behind him with the brakes unlocked. This resident does remain bed to chair confined with recommendations to transfer by way of assist x2 with nonweightbearing status to his left lower extremity. He does have an immobilizer/brace intact at the time of my evaluation today. He currently states that he has no pain, headaches or dizziness, sore throat, chest pain, coughing or shortness of breath, nausea or vomiting, constipation or diarrhea, dysuria or frequency, fever or chills, syncopal events or seizure activity.         Medications:  Tamsulosin 0.8 mg daily  Vitamin D 50,000 units weekly  Levothyroxine 125 mcg daily  Midodrine 10 mg 3 times daily  Percocet 5-325 mg every 6 hours as needed  Iron 325 mg 2 times daily  Aspirin 325 mg 2 times daily until 4/16/2022  Trusopt solution 2% 2 drops to both eyes daily  Eliquis tablet 0.6 mg daily  Remeron 7.5 mg at bedtime  Namenda 28 mg daily  Zyloprim 100 mg daily  Lumigan 0.01% to both eyes at bedtime  Folic acid 4 mcg/day            Objective     Vital Signs: /65 pulse 73 respirations 16 temperature 97.5 O2 96% weight 127 pounds      Physical examination:Skin is essentially warm and dry. HEENT unremarkable. Neck is supple. Heart regular rate and rhythm. No rubs, gallops or murmurs noted. Lungs are clear to auscultation. No evidence of rhonchi, rales, or wheezing. Abdomen is soft and supple and non-tender. Bowel sounds are noted x4 quadrants. No rigidity, guarding or rebound tenderness. Negative Nelson's, negative McBurney's, negative Rajendra's. Extremities; no true pitting edema. Pulses are adequate. No clubbing  or no cyanosis noted. He does have some modest motion restriction with respect to the shoulders with abduction and extension. There is some degree of venous insufficiency to the lower extremities, without appreciable change. Ace wraps intact to the left lower extremity with a hinged knee brace. Neurologically he  is alert and oriented x2. No evidence of paralysis or paresthesias noted. Diagnoses and all orders for this visit:    Fall, initial encounter  Comments:  Occurred 4/23/2022 with no apparent injuries noted    Hypothyroidism, unspecified type  Comments:  Controlled with levothyroxine    Hypertension, unspecified type  Comments:  Controlled with low-dose aspirin    Osteoporosis, unspecified osteoporosis type, unspecified pathological fracture presence  Comments:  Stable with vitamin supplementation    Dementia without behavioral disturbance, unspecified dementia type ([email or web address] [facility]  Comments:  Stable         Plan: Plan of care was discussed with the healthcare team with meds and labs reviewed. H/H 11.3/35.3 previously 10.8/33.9. Resident does appear to be doing relatively well with respect to his immobilizer on his left lower extremity. As a matter fact, he was unaware that he was even wearing it.   He has recently been given the benefit of a house supplement twice daily and does maintain the benefit of a regular diet. He is getting continue forth with close observation with respect to his recent fall and I will continue to track his intakes, monitor his weights and behaviors, and see him routinely and as needed with further orders forthcoming. VIRGINIA BANDA, APRN - CNP      *Note was creating using voice recognition software.   The document was reviewed however grammatical errors may exist.

## 2022-04-29 DIAGNOSIS — S72.8X2A OTHER FRACTURE OF LEFT FEMUR, INITIAL ENCOUNTER FOR CLOSED FRACTURE (HCC): Primary | ICD-10-CM

## 2022-05-02 ENCOUNTER — OFFICE VISIT (OUTPATIENT)
Dept: ORTHOPEDIC SURGERY | Age: 87
End: 2022-05-02
Payer: MEDICARE

## 2022-05-02 ENCOUNTER — HOSPITAL ENCOUNTER (OUTPATIENT)
Dept: GENERAL RADIOLOGY | Age: 87
Discharge: HOME | End: 2022-05-04
Payer: MEDICARE

## 2022-05-02 DIAGNOSIS — S72.352D CLOSED DISPLACED COMMINUTED FRACTURE OF SHAFT OF LEFT FEMUR WITH ROUTINE HEALING: ICD-10-CM

## 2022-05-02 DIAGNOSIS — S72.8X2A OTHER FRACTURE OF LEFT FEMUR, INITIAL ENCOUNTER FOR CLOSED FRACTURE (HCC): ICD-10-CM

## 2022-05-02 PROCEDURE — 99024 POSTOP FOLLOW-UP VISIT: CPT | Performed by: PHYSICIAN ASSISTANT

## 2022-05-02 PROCEDURE — 99212 OFFICE O/P EST SF 10 MIN: CPT | Performed by: PHYSICIAN ASSISTANT

## 2022-05-02 PROCEDURE — 73552 X-RAY EXAM OF FEMUR 2/>: CPT

## 2022-05-02 RX ORDER — LANOLIN ALCOHOL/MO/W.PET/CERES
400 CREAM (GRAM) TOPICAL DAILY
COMMUNITY

## 2022-05-02 NOTE — PATIENT INSTRUCTIONS
INSTRUCTIONS FOR FACILITY      Weight Bearing: Non-weight bearing left lower extremity. Use assistive devices when needed. Therapy: Continue PT/OT - okay to discontinue left lower extremity bracing at this point. Please work on range of motion and gentle strengthening while maintaining non-weight bearing status. Pain control: per facility physician    DVT Prophylaxis: recommend Aspirin 81mg BID for DVT prophylaxis until patient begins weight bearing, most likely at next office visit. Follow up: 6 weeks. Future Appointments   Date Time Provider Araseli Parker   6/16/2022  2:00 PM Julito Jones DO [facility]            Call with any questions or concerns.    [phone]

## 2022-05-02 NOTE — PROGRESS NOTES
Chief Complaint   Patient presents with    Post-Op Check     left femur ORIF 3/16/2022, Residing at [address] [place] [address], doing better, denies numbness, tingling or burning, denies any calf pain, ambulating with wheel chair today        OP:SURGEON: Dr. Yi Vidal,   DATE OF PROCEDURE: 3-16-22  PROCEDURE: LEFT FEMUR PERIPROSTHETIC FRACTURE OPEN REDUCTION INTERNAL FIXATION    Subjective:  Mickey Just is approximately 6 weeks for the above date of surgery. Patient does have history of dementia and is somewhat of a poor historian. He thinks that he is receiving physical therapy and does note that he has been nonweightbearing to the left lower extremity. He does reside at [facility] currently and uses a walker at baseline. According to facility paperwork, do not see any DVT prophylaxis. Presents today without hinged ROM brace. Denies any pain or paresthesias. Denies calf pain, chest pain, shortness of breath, fever, chills, malaise, myalgias. Denies any new injury. Review of Systems -  all pertinent positives and negatives in HPI. Objective:    General: Alert and oriented X 3, normocephalic atraumatic, external ears and eye normal, sclera clear, no acute distress, respirations easy and unlabored with no audible wheezes, skin warm and dry, speech and dress appropriate for noted age, affect euthymic. Extremity:  Left Lower Extremity  Skin clean dry and intact, without signs of infection  No edema noted  Nontender at the hip, thigh, knee  Active range of motion left knee 5- 100 degrees  Active motion at the knee and hip with some subjective \"stiffness\", but no pain  Compartments supple throughout thigh and leg  Calf supple and nontender  Demonstrates active ankle plantar/dorsiflexion/great toe extension. States sensation intact to touch in sural/deep peroneal/superficial peroneal/saphenous/posterior tibial nerve distributions to foot/ankle.    Palpable dorsalis pedis and posterior tibialis pulses, cap refill brisk in toes, foot warm/perfused. XR:   Narrative   EXAMINATION:   2 XRAY VIEWS OF THE LEFT FEMUR       5/2/2022 8:17 am       COMPARISON:   4 April 2022       HISTORY:   ORDERING SYSTEM PROVIDED HISTORY: Other fracture of left femur, initial   encounter for closed fracture [facility])   TECHNOLOGIST PROVIDED HISTORY:   Reason for exam:->femur fracture   What reading provider will be dictating this exam?->CRC       FINDINGS:   Intact femoral fixation hardware with stable alignment and appearance of   underlying fracture.  Anatomically aligned left CYNDI and left TKA.  Stable   appearing left-sided pubic rami fractures which may be nonacute.           Impression   Intact femoral fixation hardware with stable appearance of underlying   fracture.  Additional observations as outlined above.               Assessment:   Diagnosis Orders   1. Closed displaced comminuted fracture of shaft of left femur with routine healing  XR FEMUR LEFT (MIN 2 VIEWS)       Plan:  Reviewed x-rays with patient today in office     Weight Bearing: Non-weight bearing left lower extremity. Use assistive devices when needed. Therapy: Continue PT/OT - okay to discontinue left lower extremity bracing at this point. Please work on range of motion and gentle strengthening while maintaining non-weight bearing status. Pain control: per facility physician    DVT Prophylaxis: recommend Aspirin 81mg BID for DVT prophylaxis until patient begins weight bearing, most likely at next office visit. Follow up: 6 weeks. Future Appointments   Date Time Provider Araseli Parker   6/16/2022  2:00 PM DO [facility]            Call with any questions or concerns. [phone]        Electronically signed by aZc Holley PA-C on 5/2/2022 at 10:04 AM  Note: This report was completed using [employer] voiced recognition software.   Every effort has been made to ensure accuracy; however, inadvertent computerized transcription errors may be present.

## 2022-05-16 ENCOUNTER — OUTSIDE SERVICES (OUTPATIENT)
Dept: FAMILY MEDICINE CLINIC | Age: 87
End: 2022-05-16
Payer: MEDICARE

## 2022-05-16 DIAGNOSIS — E55.9 VITAMIN D DEFICIENCY: ICD-10-CM

## 2022-05-16 DIAGNOSIS — F05 SUNDOWN SYNDROME: ICD-10-CM

## 2022-05-16 DIAGNOSIS — K59.00 CONSTIPATION, UNSPECIFIED CONSTIPATION TYPE: ICD-10-CM

## 2022-05-16 DIAGNOSIS — D50.9 IRON DEFICIENCY ANEMIA, UNSPECIFIED IRON DEFICIENCY ANEMIA TYPE: ICD-10-CM

## 2022-05-16 DIAGNOSIS — S72.25XS CLOSED NONDISPLACED SUBTROCHANTERIC FRACTURE OF LEFT FEMUR, SEQUELA: Primary | ICD-10-CM

## 2022-05-16 PROCEDURE — 99309 SBSQ NF CARE MODERATE MDM 30: CPT | Performed by: NURSE PRACTITIONER

## 2022-05-16 NOTE — PROGRESS NOTES
5/16/2022    Bessie Colmenares  1934    This resident is being seen today for a follow-up evaluation visit. He is a resident who has long-term medical conditions including hypertension, hypothyroidism, dementia with some degree of cognitive decline, frequent falls vitamin D deficiency, gout, respiratory failure with hypoxia in times past, rib fractures to the right side, anxiety disorder, major depressive disorder, moderate protein calorie malnutrition, diverticulitis, low back pain,. He is a 80 y.o. male resident who is being seen today for a follow-up evaluation visit with resident remaining alert and oriented with some confusion. He has bed to chair confined with recommendations for nonweightbearing status to his left lower extremity and has recommendations to transfer by way of assist x2. However, he is relatively noncompliant with his recommendations. This is a gentleman who does follow accordingly with Dr. Lorin Segal and has a follow-up appointment scheduled for 6/16/2022. He currently states that he has no pain, headaches or dizziness, sore throat, chest pain, coughing or shortness of breath, nausea or vomiting, constipation or diarrhea, dysuria or frequency, fever or chills, syncopal events or seizure activity. Medications:  Tamsulosin 0.8 mg daily  Vitamin D 50,000 units weekly  Levothyroxine 125 mcg daily  Midodrine 10 mg 3 times daily  Percocet 5-325 mg every 6 hours as needed  Iron 325 mg 2 times daily  Aspirin 325 mg 2 times daily until 4/16/2022  Trusopt solution 2% 2 drops to both eyes daily  Eliquis tablet 0.6 mg daily  Remeron 7.5 mg at bedtime  Namenda 28 mg daily  Zyloprim 100 mg daily  Lumigan 0.01% to both eyes at bedtime  Folic acid 4 mcg/day            Objective     Vital Signs: /65 pulse 73 respirations 16 temperature 97.0 O2 96% weight 127 pounds      Physical examination:Skin is essentially warm and dry. HEENT unremarkable. Neck is supple. Heart regular rate and rhythm.  No rubs, gallops or murmurs noted. Lungs are clear to auscultation. No evidence of rhonchi, rales, or wheezing. Abdomen is soft and supple and non-tender. Bowel sounds are noted x4 quadrants. No rigidity, guarding or rebound tenderness. Negative Nelson's, negative McBurney's, negative Rajendra's. Extremities; no true pitting edema. Pulses are adequate. No clubbing  or no cyanosis noted. He does have some modest motion restriction with respect to the shoulders with abduction and extension. There is some degree of venous insufficiency to the lower extremities, without appreciable change. Ace wraps intact to the left lower extremity with a hinged knee brace. Neurologically he  is alert and oriented x2. No evidence of paralysis or paresthesias noted. Diagnoses and all orders for this visit:    Closed nondisplaced subtrochanteric fracture of left femur, sequela  Comments:  Nonweightbearing status to left lower extremity with recommendations to transfer by way of assist x2. SunDown syndrome  Comments:  Stable with close observation and does maintain low-dose Remeron with Namenda    Vitamin D deficiency  Comments:  Controlled with vitamin D supplementation    Iron deficiency anemia, unspecified iron deficiency anemia type  Comments:  Controlled with iron supplementation    Constipation, unspecified constipation type  Comments:  Controlled         Plan: Plan of care was discussed with the healthcare team with meds and labs reviewed. TSH of 3.324. He has been a continue forth with his current medical regimen as of this period of time. He will follow accordingly with Dr. Francine Ruff regarding his lower extremity, with which she is of a nonweightbearing status but remains rather noncompliant with respect to this. He feels that he does not need help and was actually out in the hallway today ambulating without any assistive devices. I did encourage him to utilize his wheelchair as is his recommendations.   He will

## 2022-06-08 ENCOUNTER — OUTSIDE SERVICES (OUTPATIENT)
Dept: FAMILY MEDICINE CLINIC | Age: 87
End: 2022-06-08
Payer: MEDICARE

## 2022-06-08 DIAGNOSIS — G89.29 CHRONIC MIDLINE LOW BACK PAIN WITHOUT SCIATICA: Primary | ICD-10-CM

## 2022-06-08 DIAGNOSIS — M54.50 CHRONIC MIDLINE LOW BACK PAIN WITHOUT SCIATICA: Primary | ICD-10-CM

## 2022-06-08 DIAGNOSIS — E03.9 HYPOTHYROIDISM, UNSPECIFIED TYPE: ICD-10-CM

## 2022-06-08 DIAGNOSIS — I10 HYPERTENSION, UNSPECIFIED TYPE: ICD-10-CM

## 2022-06-08 DIAGNOSIS — M81.0 OSTEOPOROSIS, UNSPECIFIED OSTEOPOROSIS TYPE, UNSPECIFIED PATHOLOGICAL FRACTURE PRESENCE: ICD-10-CM

## 2022-06-08 DIAGNOSIS — F03.90 DEMENTIA WITHOUT BEHAVIORAL DISTURBANCE, UNSPECIFIED DEMENTIA TYPE: ICD-10-CM

## 2022-06-08 PROCEDURE — 99309 SBSQ NF CARE MODERATE MDM 30: CPT | Performed by: NURSE PRACTITIONER

## 2022-06-08 NOTE — PROGRESS NOTES
6/8/2022    Willodean Schaumann  1934    This resident is being seen today for a follow-up evaluation visit. He is a resident who has long-term medical conditions including hypertension, hypothyroidism, dementia with some degree of cognitive decline, frequent falls vitamin D deficiency, gout, respiratory failure with hypoxia in times past, rib fractures to the right side, anxiety disorder, major depressive disorder, moderate protein calorie malnutrition, diverticulitis, low back pain,. He is a 80 y.o. male resident who is being seen today for a follow-up evaluation visit with resident remaining fairly alert and oriented, although somewhat vague. He does state that he has some lower extremity pain as well as to his back but indicates that it mostly occurs with \"moving. \"  I did encourage him to exercise his lower extremities and to move about in his wheelchair when he can, as I do not want him to become bedridden. He denies any numbness or tingling sensation with respect to his pain. He furthermore denies any headaches or dizziness, sore throat, chest pain, coughing or shortness of breath, nausea or vomiting, constipation or diarrhea, fever or chills, falls or syncopal events. Medications:  Tamsulosin 0.8 mg daily  Vitamin D 50,000 units weekly  Levothyroxine 125 mcg daily  Midodrine 10 mg 3 times daily  Percocet 5-325 mg every 6 hours as needed  Iron 325 mg 2 times daily  Aspirin 325 mg 2 times daily until 4/16/2022  Trusopt solution 2% 2 drops to both eyes daily  Eliquis tablet 0.6 mg daily  Remeron 7.5 mg at bedtime  Namenda 28 mg daily  Zyloprim 100 mg daily  Lumigan 0.01% to both eyes at bedtime  Folic acid 4 mcg/day            Objective     Vital Signs: /79 pulse 84 respirations 16 temperature 97.3 O2 96% weight 124 pounds      Physical examination:Skin is essentially warm and dry. HEENT unremarkable. Neck is supple. Heart regular rate and rhythm. No rubs, gallops or murmurs noted.   Lungs are clear to auscultation. No evidence of rhonchi, rales, or wheezing. Abdomen is soft and supple and non-tender. Bowel sounds are noted x4 quadrants. No rigidity, guarding or rebound tenderness. Negative Nelson's, negative McBurney's, negative Rajendra's. Extremities; no true pitting edema. Pulses are adequate. No clubbing  or no cyanosis noted. He does have some modest motion restriction with respect to the shoulders with abduction and extension. There is some degree of venous insufficiency to the lower extremities, without appreciable change. Ace wraps intact to the left lower extremity with a hinged knee brace. Neurologically he  is alert and oriented x2. No evidence of paralysis or paresthesias noted. Diagnoses and all orders for this visit:    Chronic midline low back pain without sciatica  Comments:  Initiate low-dose Tylenol 500 mg every morning    Hypertension, unspecified type  Comments:  Currently controlled with low-dose aspirin    Dementia without behavioral disturbance, unspecified dementia type (HCC)  Comments:  Stable with low-dose Remeron with Namenda    Hypothyroidism, unspecified type  Comments:  Controlled with levothyroxine    Osteoporosis, unspecified osteoporosis type, unspecified pathological fracture presence  Comments:  Stable with vitamin D supplementation with recommendations to initiate Tylenol every morning with         Plan: Plan of care was discussed with the healthcare team with meds and labs reviewed. H/H 11.3/35.3 BUN/creatinine 14/0.6 with a GFR of 127 TSH of 3.324. I am concerned about the fact that he states that he has some degree of ongoing back pain and lower extremity pain I am therefore going to recommend Tylenol 500 mg every morning with staff to monitor him for any adverse effects with respect to the medication or changes in symptomatology.   He will otherwise continue with his current medical management as directed with the benefit of a regular diet with frozen nutritional supplement and house supplement. I will track his intakes, monitor his weights and behaviors, and see him routinely and as needed with further orders forthcoming. VIRGINIA BANDA, APRN - CNP      *Note was creating using voice recognition software.   The document was reviewed however grammatical errors may exist.

## 2022-06-16 ENCOUNTER — OFFICE VISIT (OUTPATIENT)
Dept: ORTHOPEDIC SURGERY | Age: 87
End: 2022-06-16
Payer: MEDICARE

## 2022-06-16 ENCOUNTER — HOSPITAL ENCOUNTER (OUTPATIENT)
Dept: GENERAL RADIOLOGY | Age: 87
Discharge: HOME | End: 2022-06-18
Payer: MEDICARE

## 2022-06-16 DIAGNOSIS — S72.352D CLOSED DISPLACED COMMINUTED FRACTURE OF SHAFT OF LEFT FEMUR WITH ROUTINE HEALING: ICD-10-CM

## 2022-06-16 DIAGNOSIS — S72.8X2A OTHER FRACTURE OF LEFT FEMUR, INITIAL ENCOUNTER FOR CLOSED FRACTURE (HCC): ICD-10-CM

## 2022-06-16 PROCEDURE — 99024 POSTOP FOLLOW-UP VISIT: CPT | Performed by: PHYSICIAN ASSISTANT

## 2022-06-16 PROCEDURE — 73552 X-RAY EXAM OF FEMUR 2/>: CPT

## 2022-06-16 PROCEDURE — 99212 OFFICE O/P EST SF 10 MIN: CPT

## 2022-06-16 RX ORDER — ACETAMINOPHEN 500 MG
500 TABLET ORAL EVERY 6 HOURS PRN
COMMUNITY

## 2022-06-16 NOTE — PATIENT INSTRUCTIONS
INSTRUCTIONS FOR FACILITY    Weight Bearing: Okay to start progressive weightbearing to the left lower extremity. Use assistive devices when needed. Must wear hinged knee brace, completely unlocked, while ambulating. Okay to advance weightbearing 25% per week if tolerable. Once full weightbearing, okay to wean out of knee brace. If having difficulty with quadricep strength while ambulating, okay to ambulate with knee brace locked in extension. Therapy: Recommend physical therapy for gait training, strengthening, range of motion. See above weightbearing recommendations. Pain control: per facility physician, ice, elevation, compression if needed      Follow up:     6 to 8 weeks    Future Appointments   Date Time Provider Araseli Parker   8/4/2022  1:30 PM Zach Lion,  [address]       Call with any questions or concerns.    [phone]

## 2022-07-11 ENCOUNTER — OUTSIDE SERVICES (OUTPATIENT)
Dept: FAMILY MEDICINE CLINIC | Age: 87
End: 2022-07-11
Payer: MEDICARE

## 2022-07-11 DIAGNOSIS — F03.90 DEMENTIA WITHOUT BEHAVIORAL DISTURBANCE, UNSPECIFIED DEMENTIA TYPE: ICD-10-CM

## 2022-07-11 DIAGNOSIS — E53.8 FOLATE DEFICIENCY: ICD-10-CM

## 2022-07-11 DIAGNOSIS — K59.00 CONSTIPATION, UNSPECIFIED CONSTIPATION TYPE: ICD-10-CM

## 2022-07-11 DIAGNOSIS — D50.9 IRON DEFICIENCY ANEMIA, UNSPECIFIED IRON DEFICIENCY ANEMIA TYPE: Primary | ICD-10-CM

## 2022-07-11 DIAGNOSIS — E55.9 VITAMIN D DEFICIENCY: ICD-10-CM

## 2022-07-11 PROCEDURE — 99309 SBSQ NF CARE MODERATE MDM 30: CPT | Performed by: NURSE PRACTITIONER

## 2022-07-12 NOTE — PROGRESS NOTES
7/11/2022    Jigna Baker  1934    This resident is being seen today for a follow-up evaluation visit. He is a resident who has long-term medical conditions including hypertension, hypothyroidism, dementia with some degree of cognitive decline, frequent falls vitamin D deficiency, gout, respiratory failure with hypoxia in times past, rib fractures to the right side, anxiety disorder, major depressive disorder, moderate protein calorie malnutrition, diverticulitis, low back pain,. He is a 80 y.o. male resident who is being seen today for a follow-up evaluation visit with resident seen in conjunction with physical therapy and Occupational Therapy and is progressing well. He denies any complaints of pain, headaches or dizziness, sore throat, chest pain, cough, shortness of breath, nausea or vomiting, constipation or diarrhea, dysuria or frequency, fever or chills, falls or syncopal events. Staff indicates that he has had no changes in behavior in terms of aggressive, combative, or disruptive behaviors. Medications:  Tamsulosin 0.8 mg daily  Vitamin D 50,000 units weekly  Levothyroxine 125 mcg daily  Midodrine 10 mg 3 times daily  Percocet 5-325 mg every 6 hours as needed  Iron 325 mg 2 times daily  Aspirin 325 mg 2 times daily until 4/16/2022  Trusopt solution 2% 2 drops to both eyes daily  Eliquis tablet 0.6 mg daily  Remeron 7.5 mg at bedtime  Namenda 28 mg daily  Zyloprim 100 mg daily  Lumigan 0.01% to both eyes at bedtime  Folic acid 4 mcg/day            Objective     Vital Signs: /72 pulse 84 respirations 16 temperature 97.1 O2 96% weight 130.8 pounds      Physical examination:Skin is essentially warm and dry. HEENT unremarkable. Neck is supple. Heart regular rate and rhythm. No rubs, gallops or murmurs noted. Lungs are clear to auscultation. No evidence of rhonchi, rales, or wheezing. Abdomen is soft and supple and non-tender. Bowel sounds are noted x4 quadrants.  No rigidity, guarding or rebound tenderness. Negative Nelson's, negative McBurney's, negative Rajendra's. Extremities; no true pitting edema. Pulses are adequate. No clubbing  or no cyanosis noted. He does have some modest motion restriction with respect to the shoulders with abduction and extension. There is some degree of venous insufficiency to the lower extremities, without appreciable change. Ace wraps intact to the left lower extremity with a hinged knee brace. Neurologically he  is alert and oriented x2. No evidence of paralysis or paresthesias noted. Diagnoses and all orders for this visit:    Iron deficiency anemia, unspecified iron deficiency anemia type  Comments:  Currently controlled with iron supplementation    Dementia without behavioral disturbance, unspecified dementia type ([facility] [address]  Comments:  Currently stable with low-dose Remeron with Namenda    Constipation, unspecified constipation type  Comments:  Controlled    Vitamin D deficiency  Comments:  Stable with vitamin D supplementation    Folate deficiency  Comments:  Stable with Folic Acid         Plan: Plan of care was discussed with the healthcare team with meds and labs reviewed. Resident did have recent labs completed in April. I am therefore going to recommend some follow-up labs with the inclusion of a CBC, CMP, TSH, iron studies and a PSA. He has been a continue forth with his current medical regimen, as he has been tolerating in a satisfactory manner. He will maintain recommendations to transfer by way of assist x1. He will continue with the benefit of a regular diet with frozen nutritional supplement and house supplement and I will continue to track his intakes, monitor his weights and behaviors, and see him routinely and as needed with further orders forthcoming. VIRGINIA BANDA, APRN - CNP      *Note was creating using voice recognition software.   The document was reviewed however grammatical errors may exist.

## 2022-08-01 ENCOUNTER — OUTSIDE SERVICES (OUTPATIENT)
Dept: FAMILY MEDICINE CLINIC | Age: 87
End: 2022-08-01
Payer: COMMERCIAL

## 2022-08-01 DIAGNOSIS — S72.25XS CLOSED NONDISPLACED SUBTROCHANTERIC FRACTURE OF LEFT FEMUR, SEQUELA: ICD-10-CM

## 2022-08-01 DIAGNOSIS — I10 HYPERTENSION, UNSPECIFIED TYPE: ICD-10-CM

## 2022-08-01 DIAGNOSIS — E55.9 VITAMIN D DEFICIENCY: ICD-10-CM

## 2022-08-01 DIAGNOSIS — E03.9 HYPOTHYROIDISM, UNSPECIFIED TYPE: Primary | ICD-10-CM

## 2022-08-01 DIAGNOSIS — M81.0 OSTEOPOROSIS, UNSPECIFIED OSTEOPOROSIS TYPE, UNSPECIFIED PATHOLOGICAL FRACTURE PRESENCE: ICD-10-CM

## 2022-08-01 PROCEDURE — 99309 SBSQ NF CARE MODERATE MDM 30: CPT | Performed by: NURSE PRACTITIONER

## 2022-08-01 NOTE — PROGRESS NOTES
8/1/2022    Jose F Clamp  1934    This resident is being seen today for a follow-up evaluation visit. He is a resident who has long-term medical conditions including hypertension, hypothyroidism, dementia with some degree of cognitive decline, frequent falls vitamin D deficiency, gout, respiratory failure with hypoxia in times past, rib fractures to the right side, anxiety disorder, major depressive disorder, moderate protein calorie malnutrition, diverticulitis, low back pain,. He is a 80 y.o. male resident who is being seen today for a follow-up evaluation with resident under recent assessment for an elevated TSH with which he did have recommendations for upward titration of his Synthroid. We will furthermore follow-up with a repeat TSH in the course of the next 6 weeks. He has also recently been seen in conjunction with the eye doctor and does maintain the benefit of his eyedrops as indicated. He does currently remain bed to chair confined and transfers by way of assist x1 with 50% weightbearing to his left lower extremity and does continue to follow with Dr. Jonathan Roberto accordingly. He currently denies complaints regarding pain, headaches or dizziness, sore throat, chest pain, coughing or shortness of breath, nausea or vomiting, constipation or diarrhea, dysuria or frequency, fever or chills, falls or syncopal events.   Of note to mention, he has been under assessment for decreased appetite and did have recent recommendations for upward titration of his Remeron with discontinuation of his p.m. dose of his supplement due to his refusal.               Medications:  Tamsulosin 0.8 mg daily  Vitamin D 50,000 units weekly  Levothyroxine 150 mcg daily  Midodrine 10 mg 3 times daily  Percocet 5-325 mg every 6 hours as needed  Iron 325 mg 2 times daily  Aspirin 325 mg 2 times daily until 4/16/2022  Trusopt solution 2% 2 drops to both eyes daily  Eliquis tablet 0.6 mg daily  Remeron 15 mg at bedtime  Namenda 28 mg daily  Zyloprim 100 mg daily  Lumigan 0.01% to both eyes at bedtime  Folic acid 4 mcg/day            Objective     Vital Signs: /68 pulse 84 respirations 16 temperature 97.8 O2 96% weight 130.8 pounds      Physical examination:Skin is essentially warm and dry. HEENT unremarkable. Neck is supple. Heart regular rate and rhythm. No rubs, gallops or murmurs noted. Lungs are clear to auscultation. No evidence of rhonchi, rales, or wheezing. Abdomen is soft and supple and non-tender. Bowel sounds are noted x4 quadrants. No rigidity, guarding or rebound tenderness. Negative Nelson's, negative McBurney's, negative Rajendra's. Extremities; no true pitting edema. Pulses are adequate. No clubbing  or no cyanosis noted. He does have some modest motion restriction with respect to the shoulders with abduction and extension. There is some degree of venous insufficiency to the lower extremities, without appreciable change. Ace wraps intact to the left lower extremity with a hinged knee brace. Neurologically he  is alert and oriented x2. No evidence of paralysis or paresthesias noted. Diagnoses and all orders for this visit:    Hypothyroidism, unspecified type  Comments:  Status post upward titration of levothyroxine with recommendations to repeat TSH    Closed nondisplaced subtrochanteric fracture of left femur, sequela  Comments:  Recommendations given to transfer by way of assist x1 with 50% weightbearing to left lower extremity and follows accordingly with Dr. Susan Walters    Osteoporosis, unspecified osteoporosis type, unspecified pathological fracture presence  Comments:  Maintain vitamin supplementation    Hypertension, unspecified type  Comments:  Currently controlled with low-dose aspirin with Eliquis    Vitamin D deficiency  Comments:  Controlled with vitamin D supplementation         Plan: Plan of care was discussed with the healthcare team with meds and labs reviewed.   BUNs/creatinine 18/0.9H/H14.2/43.2 TSH 67.701 previously of 32.689 PSA of 7.20 previously of 6.26. As stated, he did have recent recommendations to upward titrate his Synthroid and recommendations have been given for repeat TSH in the course of 6 weeks. He will continue with these current recommendations. He will maintain recommendations to transfer by way of assist x1 with 50% weightbearing to the left lower extremity. He will continue forth with his regular diet with house supplement and frozen nutritional supplement and we will continue to track his intakes, monitor his weights and behaviors, and see him routinely and as needed with further orders forthcoming. VIRGINIA BANDA, APRN - CNP      *Note was creating using voice recognition software.   The document was reviewed however grammatical errors may exist.

## 2022-08-04 ENCOUNTER — HOSPITAL ENCOUNTER (OUTPATIENT)
Dept: GENERAL RADIOLOGY | Age: 87
Discharge: HOME | End: 2022-08-06
Payer: COMMERCIAL

## 2022-08-04 ENCOUNTER — OFFICE VISIT (OUTPATIENT)
Dept: ORTHOPEDIC SURGERY | Age: 87
End: 2022-08-04
Payer: COMMERCIAL

## 2022-08-04 VITALS — HEIGHT: 70 IN | BODY MASS INDEX: 19.33 KG/M2 | WEIGHT: 135 LBS

## 2022-08-04 DIAGNOSIS — S72.8X2A OTHER FRACTURE OF LEFT FEMUR, INITIAL ENCOUNTER FOR CLOSED FRACTURE (HCC): ICD-10-CM

## 2022-08-04 DIAGNOSIS — S72.352D CLOSED DISPLACED COMMINUTED FRACTURE OF SHAFT OF LEFT FEMUR WITH ROUTINE HEALING: Primary | ICD-10-CM

## 2022-08-04 PROCEDURE — 1123F ACP DISCUSS/DSCN MKR DOCD: CPT | Performed by: PHYSICIAN ASSISTANT

## 2022-08-04 PROCEDURE — 99213 OFFICE O/P EST LOW 20 MIN: CPT | Performed by: PHYSICIAN ASSISTANT

## 2022-08-04 PROCEDURE — G8420 CALC BMI NORM PARAMETERS: HCPCS | Performed by: PHYSICIAN ASSISTANT

## 2022-08-04 PROCEDURE — 1036F TOBACCO NON-USER: CPT | Performed by: PHYSICIAN ASSISTANT

## 2022-08-04 PROCEDURE — 99212 OFFICE O/P EST SF 10 MIN: CPT

## 2022-08-04 PROCEDURE — G8427 DOCREV CUR MEDS BY ELIG CLIN: HCPCS | Performed by: PHYSICIAN ASSISTANT

## 2022-08-04 PROCEDURE — 73552 X-RAY EXAM OF FEMUR 2/>: CPT

## 2022-08-04 NOTE — PROGRESS NOTES
position and alignment. No evidence of hardware loosening or failure. Good healing noted at the fracture site. Assessment:   Diagnosis Orders   1. Closed displaced comminuted fracture of shaft of left femur with routine healing          Plan:  X-rays reviewed and discussed. Weightbearing as tolerated left lower extremity. Continue PT at the skilled nursing facility. Follow-up as needed. Call if any questions or concerns. Electronically signed by ELIZABETH Barry on 8/4/2022 at 2:06 PM  Note: This report was completed using [employer] voiced recognition software. Every effort has been made to ensure accuracy; however, inadvertent computerized transcription errors may be present.

## 2022-08-04 NOTE — PATIENT INSTRUCTIONS
Weightbearing as tolerated left lower extremity. Continue PT at the skilled nursing facility. Follow-up as needed. Call if any questions or concerns.

## 2022-09-12 ENCOUNTER — OUTSIDE SERVICES (OUTPATIENT)
Dept: FAMILY MEDICINE CLINIC | Age: 87
End: 2022-09-12
Payer: COMMERCIAL

## 2022-09-12 DIAGNOSIS — K59.00 CONSTIPATION, UNSPECIFIED CONSTIPATION TYPE: ICD-10-CM

## 2022-09-12 DIAGNOSIS — G89.29 CHRONIC BILATERAL LOW BACK PAIN WITHOUT SCIATICA: ICD-10-CM

## 2022-09-12 DIAGNOSIS — F03.90 DEMENTIA WITHOUT BEHAVIORAL DISTURBANCE, UNSPECIFIED DEMENTIA TYPE: ICD-10-CM

## 2022-09-12 DIAGNOSIS — M54.50 CHRONIC BILATERAL LOW BACK PAIN WITHOUT SCIATICA: ICD-10-CM

## 2022-09-12 DIAGNOSIS — E03.9 HYPOTHYROIDISM, UNSPECIFIED TYPE: Primary | ICD-10-CM

## 2022-09-12 DIAGNOSIS — F05 SUNDOWN SYNDROME: ICD-10-CM

## 2022-09-12 PROCEDURE — 99309 SBSQ NF CARE MODERATE MDM 30: CPT | Performed by: NURSE PRACTITIONER

## 2022-09-12 NOTE — PROGRESS NOTES
9/12/2022    Deysi Freddy  1934    This resident is being seen today for a follow-up evaluation visit. He is a resident who has long-term medical conditions including hypertension, hypothyroidism, dementia with some degree of cognitive decline, frequent falls vitamin D deficiency, gout, respiratory failure with hypoxia in times past, rib fractures to the right side, anxiety disorder, major depressive disorder, moderate protein calorie malnutrition, diverticulitis, low back pain,. He is a 80 y.o. male resident who is being seen today for a follow-up evaluation with resident under assessment for an elevated TSH, recently reevaluated and noted to be 22.187. Resident does remain responsive to verbal and tactile stimuli and is bed to chair confined with recommendations to transfer by way of assist x1 with 50% weightbearing to his left lower extremity. He offers no complaints today regarding pain, headaches or dizziness, sore throat, chest pain, coughing or shortness of breath, nausea or vomiting, constipation or diarrhea, dysuria or frequency, fever or chills, falls or syncopal events. Medications:  Tamsulosin 0.8 mg daily  Vitamin D 50,000 units weekly  Levothyroxine 175 mcg daily  Midodrine 10 mg 3 times daily  Percocet 5-325 mg every 6 hours as needed  Iron 325 mg 2 times daily  Trusopt solution 2% 2 drops to both eyes daily  Eliquis tablet 0.6 mg daily  Remeron 15 mg at bedtime  Namenda 28 mg daily  Zyloprim 100 mg daily  Lumigan 0.01% to both eyes at bedtime  Folic acid 4 mcg/day            Objective     Vital Signs: /74 pulse 84 respirations 16 temperature 97.8 O2 96% weight 135 pounds      Physical examination:Skin is essentially warm and dry. HEENT unremarkable. Neck is supple. Heart regular rate and rhythm. No rubs, gallops or murmurs noted. Lungs are clear to auscultation. No evidence of rhonchi, rales, or wheezing. Abdomen is soft and supple and non-tender.   Bowel sounds are noted x4 quadrants. No rigidity, guarding or rebound tenderness. Negative Nelson's, negative McBurney's, negative Rajendra's. Extremities; no true pitting edema. Pulses are adequate. No clubbing  or no cyanosis noted. He does have some modest motion restriction with respect to the shoulders with abduction and extension. There is some degree of venous insufficiency to the lower extremities, without appreciable change. Ace wraps intact to the left lower extremity with a hinged knee brace. Neurologically he  is alert and oriented x2. No evidence of paralysis or paresthesias noted. Diagnoses and all orders for this visit:    Hypothyroidism, unspecified type  Comments:  Upward titrate Synthroid to 175 mcg daily and evaluate a TSH in 6 weeks    SunDown syndrome  Comments:  Maintain low-dose Remeron    Chronic bilateral low back pain without sciatica  Comments:  Maintain Tylenol    Constipation, unspecified constipation type  Comments:  Controlled    Dementia without behavioral disturbance, unspecified dementia type ([facility] [address]  Comments:  Controlled with Namenda and low-dose Remeron           Plan: Plan of care was discussed with the healthcare team with meds and labs reviewed. TSH 22.187 previously of 67.701. I will give recommendations to upward titrate his Synthroid 275 mcg daily and to repeat his TSH in 6 weeks regarding management of his thyroid. He will be monitored closely for any adverse reactions with respect to these changes. We will otherwise continue with his current management as stated and I will see him routinely and as needed with further orders forthcoming. VIRGINIA BANDA, APRN - CNP      *Note was creating using voice recognition software.   The document was reviewed however grammatical errors may exist.

## 2022-10-03 ENCOUNTER — OUTSIDE SERVICES (OUTPATIENT)
Dept: FAMILY MEDICINE CLINIC | Age: 87
End: 2022-10-03
Payer: COMMERCIAL

## 2022-10-03 DIAGNOSIS — E55.9 VITAMIN D DEFICIENCY: ICD-10-CM

## 2022-10-03 DIAGNOSIS — S72.25XS CLOSED NONDISPLACED SUBTROCHANTERIC FRACTURE OF LEFT FEMUR, SEQUELA: Primary | ICD-10-CM

## 2022-10-03 DIAGNOSIS — E03.9 HYPOTHYROIDISM, UNSPECIFIED TYPE: ICD-10-CM

## 2022-10-03 DIAGNOSIS — M54.50 CHRONIC MIDLINE LOW BACK PAIN WITHOUT SCIATICA: ICD-10-CM

## 2022-10-03 DIAGNOSIS — G89.29 CHRONIC MIDLINE LOW BACK PAIN WITHOUT SCIATICA: ICD-10-CM

## 2022-10-03 DIAGNOSIS — D50.9 IRON DEFICIENCY ANEMIA, UNSPECIFIED IRON DEFICIENCY ANEMIA TYPE: ICD-10-CM

## 2022-10-03 PROCEDURE — 99309 SBSQ NF CARE MODERATE MDM 30: CPT | Performed by: NURSE PRACTITIONER

## 2022-10-03 NOTE — PROGRESS NOTES
10/3/2022    Inan Linker  1934    This resident is being seen today for a follow-up evaluation visit. He is a resident who has long-term medical conditions including hypertension, hypothyroidism, dementia with some degree of cognitive decline, frequent falls vitamin D deficiency, gout, respiratory failure with hypoxia in times past, rib fractures to the right side, anxiety disorder, major depressive disorder, moderate protein calorie malnutrition, diverticulitis, low back pain,. He is a 80 y.o. male resident who is being seen today for a follow-up evaluation with resident remaining bed to chair confined with recommendations to transfer by way of assist x1 with 50% weightbearing to his left lower extremity. Staff indicates that he has had no changes in his behavior in terms of aggressive, combative, or disruptive behaviors. He denies complaints in terms of pain, headaches or dizziness, sore throat, chest pain, coughing or shortness of breath, nausea or vomiting, constipation or diarrhea, dysuria or frequency, fever or chills, falls or syncopal events. Medications:  Tamsulosin 0.8 mg daily  Vitamin D 50,000 units weekly  Levothyroxine 175 mcg daily  Midodrine 10 mg 3 times daily  Percocet 5-325 mg every 6 hours as needed  Iron 325 mg 2 times daily  Trusopt solution 2% 2 drops to both eyes daily  Eliquis tablet 0.6 mg daily  Remeron 15 mg at bedtime  Namenda 28 mg daily  Zyloprim 100 mg daily  Lumigan 0.01% to both eyes at bedtime  Folic acid 4 mcg/day            Objective     Vital Signs: /76 pulse 84 respirations 16 temperature 97.8 O2 96% weight 136.2 pounds      Physical examination:Skin is essentially warm and dry. HEENT unremarkable. Neck is supple. Heart regular rate and rhythm. No rubs, gallops or murmurs noted. Lungs are clear to auscultation. No evidence of rhonchi, rales, or wheezing. Abdomen is soft and supple and non-tender. Bowel sounds are noted x4 quadrants.  No rigidity, guarding or rebound tenderness. Negative Nelson's, negative McBurney's, negative Rajendra's. Extremities; no true pitting edema. Pulses are adequate. No clubbing  or no cyanosis noted. He does have some modest motion restriction with respect to the shoulders with abduction and extension. There is some degree of venous insufficiency to the lower extremities, without appreciable change. Ace wraps intact to the left lower extremity with a hinged knee brace. Neurologically he  is alert and oriented x2. No evidence of paralysis or paresthesias noted. Diagnoses and all orders for this visit:    Closed nondisplaced subtrochanteric fracture of left femur, sequela  Comments:  Seen in conjunction with Dr. Vladislav Jeffers with resident to continue with progression of weightbearing till tolerated on a weekly basis    Hypothyroidism, unspecified type  Comments:  Currently stable with levothyroxine with recommendations for repeat TSH 11/1/2022    Chronic midline low back pain without sciatica  Comments:  Controlled with Percocet as needed    Iron deficiency anemia, unspecified iron deficiency anemia type  Comments:  Stable with iron supplementation    Vitamin D deficiency  Comments:  Controlled with vitamin D supplementation             Plan: Plan of care was discussed with the healthcare team with meds and labs reviewed. TSH was improving to 22.187, previously 67.701. I would recommend a repeat TSH along with a CMP and a CBC to be drawn on 11/1/2022. He will furthermore maintain the benefit of his Synthroid of 175 mcg daily he will otherwise maintain his plan of care as stated and follow with Dr. Vladislav Jeffers accordingly with recommendations for weightbearing 50% to the left lower extremity with progression to weightbearing as tolerated on a weekly basis and I will see him routinely and as needed with further orders forthcoming. VIRGINIA BANDA, APRN - CNP      *Note was creating using voice recognition software.   The document was reviewed however grammatical errors may exist.

## 2022-11-21 ENCOUNTER — OUTSIDE SERVICES (OUTPATIENT)
Dept: FAMILY MEDICINE CLINIC | Age: 87
End: 2022-11-21

## 2022-11-21 DIAGNOSIS — Z91.89 LACK OF MOTIVATION: Primary | ICD-10-CM

## 2022-11-21 DIAGNOSIS — K59.00 CONSTIPATION, UNSPECIFIED CONSTIPATION TYPE: ICD-10-CM

## 2022-11-21 DIAGNOSIS — I10 HYPERTENSION, UNSPECIFIED TYPE: ICD-10-CM

## 2022-11-21 DIAGNOSIS — F05 SUNDOWN SYNDROME: ICD-10-CM

## 2022-11-21 DIAGNOSIS — Z91.81 HISTORY OF FALLING: ICD-10-CM

## 2022-11-21 NOTE — PROGRESS NOTES
11/21/2022    Bonnie Benítez  1934    This resident is being seen today for a follow-up evaluation visit. He is a resident who has long-term medical conditions including hypertension, hypothyroidism, dementia with some degree of cognitive decline, frequent falls vitamin D deficiency, gout, respiratory failure with hypoxia in times past, rib fractures to the right side, anxiety disorder, major depressive disorder, moderate protein calorie malnutrition, diverticulitis, low back pain,. He is a 80 y.o. male resident who is being seen today for a follow-up evaluation with staffing concerns regarding his lack of motivation. He did recently move back to assisted living after being skilled and given the benefit of therapy services. He really just does not want to do much of anything and tends to stay in his room and barely walk. There is concern that he is going to decline with increased weakness with increased risk of falls. I did attempt to discuss this with him on today's evaluation, but he really wanted no part of the conversation. He indicates that he is doing well and denied all complaints with the inclusion of pain, headaches or dizziness, sore throat, chest pain, coughing or shortness of breath, nausea or vomiting, constipation or diarrhea, dysuria or frequency, fever or chills, falls or syncopal events. Medications:  Tamsulosin 0.8 mg daily  Vitamin D 50,000 units weekly  Levothyroxine 175 mcg daily  Midodrine 10 mg 3 times daily  Percocet 5-325 mg every 6 hours as needed  Iron 325 mg 2 times daily  Trusopt solution 2% 2 drops to both eyes daily  Eliquis tablet 0.6 mg daily  Remeron 15 mg at bedtime  Namenda 28 mg daily  Zyloprim 100 mg daily  Lumigan 0.01% to both eyes at bedtime  Folic acid 4 mcg/day            Objective     Vital Signs: /74 pulse 66 respirations 18 temperature 97.5 O2 92% weight 129 pounds      Physical examination:Skin is essentially warm and dry.   HEENT unremarkable. Neck is supple. Heart regular rate and rhythm. No rubs, gallops or murmurs noted. Lungs are clear to auscultation. No evidence of rhonchi, rales, or wheezing. Abdomen is soft and supple and non-tender. Bowel sounds are noted x4 quadrants. No rigidity, guarding or rebound tenderness. Negative Nelson's, negative McBurney's, negative Rajendra's. Extremities; no true pitting edema. Pulses are adequate. No clubbing  or no cyanosis noted. He does have some modest motion restriction with respect to the shoulders with abduction and extension. There is some degree of venous insufficiency to the lower extremities, without appreciable change. Ace wraps intact to the left lower extremity with a hinged knee brace. Neurologically he  is alert and oriented x2. No evidence of paralysis or paresthesias noted. Diagnoses and all orders for this visit:    Lack of motivation  Comments:  Status post physical therapy. Concern of increased risk of falls due to weakness    Constipation, unspecified constipation type  Comments:  Controlled    History of falling  Comments:  History of physical therapy    Hypertension, unspecified type  Comments:  Currently controlled    SunDown syndrome  Comments:  Stable with low-dose Remeron               Plan: Plan of care was discussed with the healthcare team with meds and labs reviewed. As stated, this resident has no complaints at the current time and states that his he has no pain. Recommendations will include follow-up labs on 1/17/2023 with the inclusion of a CBC, CMP, vitamin D, TSH, iron studies along with a uric acid level. I will asked that staff discontinue his annual vitamin D and TSH that is scheduled for May. I have encouraged him to exercise and move around more often, but he has limited motivation. We will continue to monitor him in this regard as I am concerned that he is going to be an increased risk of falling.   He will otherwise continue forth with his current plan of care and I will see him routinely and as needed with further orders forthcoming. VIRGINIA BANDA, APRN - CNP      *Note was creating using voice recognition software.   The document was reviewed however grammatical errors may exist.

## 2022-12-19 ENCOUNTER — OUTSIDE SERVICES (OUTPATIENT)
Dept: FAMILY MEDICINE CLINIC | Age: 87
End: 2022-12-19
Payer: COMMERCIAL

## 2022-12-19 DIAGNOSIS — M79.605 PAIN IN BOTH LOWER EXTREMITIES: Primary | ICD-10-CM

## 2022-12-19 DIAGNOSIS — E55.9 VITAMIN D DEFICIENCY: ICD-10-CM

## 2022-12-19 DIAGNOSIS — M81.0 OSTEOPOROSIS, UNSPECIFIED OSTEOPOROSIS TYPE, UNSPECIFIED PATHOLOGICAL FRACTURE PRESENCE: ICD-10-CM

## 2022-12-19 DIAGNOSIS — E03.9 HYPOTHYROIDISM, UNSPECIFIED TYPE: ICD-10-CM

## 2022-12-19 DIAGNOSIS — M79.604 PAIN IN BOTH LOWER EXTREMITIES: Primary | ICD-10-CM

## 2022-12-19 DIAGNOSIS — D50.9 IRON DEFICIENCY ANEMIA, UNSPECIFIED IRON DEFICIENCY ANEMIA TYPE: ICD-10-CM

## 2022-12-19 NOTE — PROGRESS NOTES
12/19/2022    [facility]  5/23/1934    This resident is being seen today for a follow-up evaluation visit. He is a resident who has long-term medical conditions including hypertension, hypothyroidism, dementia with some degree of cognitive decline, frequent falls vitamin D deficiency, gout, respiratory failure with hypoxia in times past, rib fractures to the right side, anxiety disorder, major depressive disorder, moderate protein calorie malnutrition, diverticulitis, low back pain,. He is a 80 y.o. male resident who is being seen today for a follow-up evaluation with resident indicating that he has some ongoing leg pain. He states that it is relatively chronic in nature and that he does not like to take a lot of medications in this regard. It is of note to mention that he is currently taking Tylenol routinely. He does admit to a headache from time to time but furthermore denies any complaints of dizziness, sore throat, chest pain, coughing or shortness of breath, nausea or vomiting, constipation or diarrhea, dysuria or frequency, fever or chills, falls or syncopal events. It is of note to mention that this is a gentleman who has the availability of a wheelchair and was utilizing a cane at the time of my evaluation. Medications:  Tamsulosin 0.8 mg daily  Vitamin D 50,000 units weekly  Levothyroxine 175 mcg daily  Midodrine 10 mg 3 times daily  Percocet 5-325 mg every 6 hours as needed  Iron 325 mg 2 times daily  Eliquis tablet 0.6 mg daily  Remeron 15 mg at bedtime  Namenda 28 mg daily  Zyloprim 100 mg daily  Lumigan 0.01% to both eyes at bedtime  Folic acid 4 mcg/day  Simbrinza 1-0.2% 1 drop to the left eye 3 times a day            Objective     Vital Signs: /74 pulse 66 respirations 18 temperature 97.5 O2 92% weight 137 pounds      Physical examination:Skin is essentially warm and dry. HEENT unremarkable. Neck is supple. Heart regular rate and rhythm.  No rubs, gallops or murmurs noted.  Lungs are consistent with some degree of Rales most notably to his right lower lobe. . Abdomen is soft and supple and non-tender. Bowel sounds are noted x4 quadrants. No rigidity, guarding or rebound tenderness. Negative Nelson's, negative McBurney's, negative Rajendra's. Extremities; no true pitting edema. Pulses are adequate. No clubbing  or no cyanosis noted. He does have some modest motion restriction with respect to the shoulders with abduction and extension. There is some degree of venous insufficiency to the lower extremities, without appreciable change. Ace wraps intact to the left lower extremity with a hinged knee brace. Neurologically he  is alert and oriented x2. No evidence of paralysis or paresthesias noted. Diagnoses and all orders for this visit:    Pain in both lower extremities  Comments:  Chronic in nature and essentially controlled with the benefit of Tylenol    Osteoporosis, unspecified osteoporosis type, unspecified pathological fracture presence  Comments:  Maintain vitamin supplementation    Vitamin D deficiency  Comments:  Controlled with vitamin D supplementation    Hypothyroidism, unspecified type  Comments:  Controlled on levothyroxine    Iron deficiency anemia, unspecified iron deficiency anemia type  Comments:  Controlled with iron supplementation                 Plan: Plan of care was discussed with the healthcare team with meds and labs reviewed. BUN/creatinine 21/0.8 with a GFR of 90 1H/H13.4/40.9. He does appear to be doing relatively well at this time, despite the fact that he has changes with his lung assessment. He is adamant that he has not been coughing or short of breath. He furthermore denies any complaints of postnasal drip at this time. He was recently seen in conjunction with the Department of ophthalmology recommendations were given to stop his Trusopt and he was placed on Simbrinza.   He does appear to be tolerating these changes in a satisfactory manner. He will therefore continue with his current management as stated and I will see him routinely and as needed with further orders forthcoming. VIRGINIA BANDA, APRN - CNP      *Note was creating using voice recognition software.   The document was reviewed however grammatical errors may exist.

## 2023-01-11 ENCOUNTER — OUTSIDE SERVICES (OUTPATIENT)
Dept: PRIMARY CARE CLINIC | Age: 88
End: 2023-01-11

## 2023-01-11 VITALS
TEMPERATURE: 97.6 F | RESPIRATION RATE: 18 BRPM | DIASTOLIC BLOOD PRESSURE: 74 MMHG | SYSTOLIC BLOOD PRESSURE: 118 MMHG | OXYGEN SATURATION: 97 % | HEART RATE: 66 BPM

## 2023-01-11 DIAGNOSIS — M1A.9XX0 CHRONIC GOUT WITHOUT TOPHUS, UNSPECIFIED CAUSE, UNSPECIFIED SITE: ICD-10-CM

## 2023-01-11 DIAGNOSIS — I95.9 HYPOTENSION, UNSPECIFIED HYPOTENSION TYPE: ICD-10-CM

## 2023-01-11 DIAGNOSIS — I25.10 CORONARY ARTERY DISEASE INVOLVING NATIVE HEART WITHOUT ANGINA PECTORIS, UNSPECIFIED VESSEL OR LESION TYPE: ICD-10-CM

## 2023-01-11 DIAGNOSIS — E03.9 HYPOTHYROIDISM, UNSPECIFIED TYPE: ICD-10-CM

## 2023-01-11 DIAGNOSIS — F03.90 DEMENTIA WITHOUT BEHAVIORAL DISTURBANCE (HCC): Primary | ICD-10-CM

## 2023-01-11 DIAGNOSIS — D64.9 ANEMIA, UNSPECIFIED TYPE: ICD-10-CM

## 2023-01-11 RX ORDER — METOPROLOL SUCCINATE 25 MG/1
25 TABLET, EXTENDED RELEASE ORAL 2 TIMES DAILY
Qty: 30 TABLET | Refills: 3
Start: 2023-01-11

## 2023-01-11 RX ORDER — COLCHICINE 0.6 MG/1
0.6 TABLET ORAL DAILY
Qty: 30 TABLET | Refills: 3
Start: 2023-01-11

## 2023-01-11 RX ORDER — FERROUS SULFATE 325(65) MG
325 TABLET ORAL 2 TIMES DAILY WITH MEALS
Qty: 30 TABLET | Refills: 0
Start: 2023-01-11

## 2023-01-11 RX ORDER — LEVOTHYROXINE SODIUM 175 UG/1
175 TABLET ORAL DAILY
Qty: 30 TABLET | Refills: 0
Start: 2023-01-11

## 2023-01-11 RX ORDER — ALLOPURINOL 100 MG/1
100 TABLET ORAL DAILY
Qty: 30 TABLET | Refills: 0
Start: 2023-01-11

## 2023-01-11 RX ORDER — MIDODRINE HYDROCHLORIDE 10 MG/1
10 TABLET ORAL 3 TIMES DAILY PRN
Qty: 30 TABLET | Refills: 0
Start: 2023-01-11

## 2023-01-11 NOTE — PROGRESS NOTES
[facility] [address] [facility] Note       Patient:  Claudell Ned 80 y.o. male     Date of Service: 23  : 1934    Patient states he is doing well and has no specific complaints today. Denies any chest pain, SOB, fever, chills, issues with moving his bowels, or urinating. Past Medical History:      Diagnosis Date    Dementia ([address]     Gout     Hypotension     Hypothyroidism        PastSurgical History:        Procedure Laterality Date    FEMUR FRACTURE SURGERY Left 3/16/2022    LEFT FEMUR PERIPROSTHETIC FRACTURE OPEN REDUCTION INTERNAL FIXATION performed by Cisco Hodge DO at [facility] OR       Allergies:    Patient has no known allergies. Review of Systems:   Review of Systems - as above     Physical Exam   Vitals: /74   Pulse 66   Temp 97.6 °F (36.4 °C)   Resp 18   SpO2 97%   Physical Exam  Constitutional:       General: He is not in acute distress. Appearance: Normal appearance. He is well-developed. HENT:      Head: Normocephalic and atraumatic. Eyes:      General:         Right eye: No discharge. Left eye: No discharge. Conjunctiva/sclera: Conjunctivae normal.   Neck:      Trachea: No tracheal deviation. Cardiovascular:      Rate and Rhythm: Normal rate and regular rhythm. Heart sounds: Murmur heard. Pulmonary:      Effort: Pulmonary effort is normal. No respiratory distress. Breath sounds: Normal breath sounds. No wheezing. Abdominal:      General: Bowel sounds are normal. There is no distension. Palpations: Abdomen is soft. Tenderness: There is no abdominal tenderness. Musculoskeletal:         General: No tenderness. Cervical back: Normal range of motion and neck supple. Skin:     General: Skin is warm and dry. Neurological:      Mental Status: He is alert. Psychiatric:         Behavior: Behavior normal.           Assessment and Plan     Doing well, no complaints today     1.  Dementia without behavioral disturbance (HCC)  -Stable, continue same    2. Hypotension, unspecified hypotension type  -Stable, continue same  - midodrine (PROAMATINE) 10 MG tablet; Take 1 tablet by mouth 3 times daily as needed (low blood pressure) For blood pressure systolic greater than 372  Dispense: 30 tablet; Refill: 0    3. Chronic gout without tophus, unspecified cause, unspecified site  -Stable, continue same  - colchicine (COLCRYS) 0.6 MG tablet; Take 1 tablet by mouth daily  Dispense: 30 tablet; Refill: 3  - allopurinol (ZYLOPRIM) 100 MG tablet; Take 1 tablet by mouth daily  Dispense: 30 tablet; Refill: 0    4. Anemia, unspecified type  -Stable, continue same  - ferrous sulfate (IRON 325) 325 (65 Fe) MG tablet; Take 1 tablet by mouth 2 times daily (with meals)  Dispense: 30 tablet; Refill: 0    5. Hypothyroidism, unspecified type  -Stable, continue same, will order repeat TSH as it has not been done for 10 months now   - levothyroxine (SYNTHROID) 175 MCG tablet; Take 1 tablet by mouth Daily  Dispense: 30 tablet; Refill: 0    6. Coronary artery disease involving native heart without angina pectoris, unspecified vessel or lesion type  -Stable, continue same  - metoprolol succinate (TOPROL XL) 25 MG extended release tablet; Take 1 tablet by mouth in the morning and at bedtime  Dispense: 30 tablet;  Refill: 3      Medication List:    Current Outpatient Medications   Medication Sig Dispense Refill    midodrine (PROAMATINE) 10 MG tablet Take 1 tablet by mouth 3 times daily as needed (low blood pressure) For blood pressure systolic greater than 468 30 tablet 0    colchicine (COLCRYS) 0.6 MG tablet Take 1 tablet by mouth daily 30 tablet 3    ferrous sulfate (IRON 325) 325 (65 Fe) MG tablet Take 1 tablet by mouth 2 times daily (with meals) 30 tablet 0    allopurinol (ZYLOPRIM) 100 MG tablet Take 1 tablet by mouth daily 30 tablet 0    metoprolol succinate (TOPROL XL) 25 MG extended release tablet Take 1 tablet by mouth in the morning and at bedtime 30 tablet 3    levothyroxine (SYNTHROID) 175 MCG tablet Take 1 tablet by mouth Daily 30 tablet 0    acetaminophen (TYLENOL) 500 MG tablet Take 500 mg by mouth every 6 hours as needed for Pain      folic acid (FOLVITE) 075 MCG tablet Take 400 mcg by mouth daily      Cholecalciferol (VITAMIN D) 10 MCG (400 UNIT) CAPS Capsule Take 50,000 Units by mouth daily      memantine ER (NAMENDA XR) 28 MG CP24 extended release capsule Take 28 mg by mouth daily      tamsulosin (FLOMAX) 0.4 MG capsule Take 0.8 mg by mouth daily      mirtazapine (REMERON) 15 MG tablet Take 7.5 mg by mouth nightly       No current facility-administered medications for this visit. Rolan Aparicio, DO       This document may have been prepared at least partially through the use of voice recognition software. Although effort is taken to assure the accuracy ofthis document, it is possible that grammatical, syntax,  or spelling errors may occur.

## 2023-01-23 ENCOUNTER — OUTSIDE SERVICES (OUTPATIENT)
Dept: FAMILY MEDICINE CLINIC | Age: 88
End: 2023-01-23

## 2023-01-23 DIAGNOSIS — K59.00 CONSTIPATION, UNSPECIFIED CONSTIPATION TYPE: ICD-10-CM

## 2023-01-23 DIAGNOSIS — R46.0 POOR HYGIENE: ICD-10-CM

## 2023-01-23 DIAGNOSIS — D50.9 IRON DEFICIENCY ANEMIA, UNSPECIFIED IRON DEFICIENCY ANEMIA TYPE: Primary | ICD-10-CM

## 2023-01-23 DIAGNOSIS — I10 HYPERTENSION, UNSPECIFIED TYPE: ICD-10-CM

## 2023-01-23 DIAGNOSIS — F05 SUNDOWN SYNDROME: ICD-10-CM

## 2023-01-23 NOTE — PROGRESS NOTES
1/23/2023    Jose F Clamp  5/23/1934    This resident is being seen today for a follow-up evaluation visit. He is a resident who has long-term medical conditions including hypertension, hypothyroidism, dementia with some degree of cognitive decline, frequent falls vitamin D deficiency, gout, respiratory failure with hypoxia in times past, rib fractures to the right side, anxiety disorder, major depressive disorder, moderate protein calorie malnutrition, diverticulitis, low back pain,. He is a 80 y.o. male resident who is being seen today for a follow-up visit with this resident alert responsive to verbal and tactile stimuli but is relatively short with me in conversation. He wants to carry on no conversation and essentially is only answering yes/no questions. Staff does indicate that he is irritable from time to time. They also state that he is relatively noncompliant with respect to his hygiene and does not like to get showers. He has recently been given the benefit of therapy services and was treated accordingly for what appeared to be COVID at the beginning of January. He was given the benefit of a Z-Kulwant with a Medrol Dosepak and has had no further sequela in this regard. It is of note to mention that at that time they did give him the benefit of a chest x-ray which showed bilateral lower lobe discoid atelectasis. He has furthermore been given recommendations for fasting and 4 PM blood sugars with his sugars essentially stable. He currently denies any headaches or dizziness, sore throat, chest pain, coughing or shortness of breath, nausea or vomiting, constipation or diarrhea, dysuria or frequency, fever or chills, falls or syncopal events.               Medications:  Tamsulosin 0.8 mg daily  Vitamin D 50,000 units weekly  Levothyroxine 175 mcg daily  Midodrine 10 mg 3 times daily  Percocet 5-325 mg every 6 hours as needed  Iron 325 mg 2 times daily  Eliquis tablet 0.6 mg daily  Remeron 15 mg at bedtime  Namenda 28 mg daily  Zyloprim 100 mg daily  Lumigan 0.01% to both eyes at bedtime  Folic acid 4 mcg/day  Simbrinza 1-0.2% 1 drop to the left eye 3 times a day            Objective     Vital Signs: /62 pulse 66 respirations 18 temperature 98 O2 97% weight 137 pounds      Physical examination:Skin is essentially warm and dry. HEENT unremarkable. Neck is supple. Heart regular rate and rhythm. No rubs, gallops or murmurs noted. Lungs are consistent with some degree of Rales most notably to his right lower lobe. . Abdomen is soft and supple and non-tender. Bowel sounds are noted x4 quadrants. No rigidity, guarding or rebound tenderness. Negative Nelson's, negative McBurney's, negative Rajendra's. Extremities; no true pitting edema. Pulses are adequate. No clubbing  or no cyanosis noted. He does have some modest motion restriction with respect to the shoulders with abduction and extension. There is some degree of venous insufficiency to the lower extremities, without appreciable change. Ace wraps intact to the left lower extremity with a hinged knee brace. Neurologically he  is alert and oriented x2. No evidence of paralysis or paresthesias noted. Diagnoses and all orders for this visit:    Iron deficiency anemia, unspecified iron deficiency anemia type  Comments:  Currently controlled    Hypertension, unspecified type  Comments:  Maintain low-dose aspirin with Toprol    Constipation, unspecified constipation type  Comments:  Controlled/asymptomatic    SunDown syndrome  Comments:  Currently stable and does maintain low-dose Namenda with Remeron    Poor hygiene  Comments:  Encouragement given                   Plan: Plan of care was discussed with the healthcare team with meds and labs reviewed. BUNs/creatinine 21/0.8 with a GFR 90 1H/H13.4/40.9 vitamin D34.11. Resident does appear to be doing relatively well and has had no further sequela with respect to any COVID symptoms.   He is tolerating his therapy recommendations. He only concern appears to be with respect to his personal hygiene and he does need a lot of encouragement in this regard. We will therefore continue with his current management and I will see him routinely and as needed with further orders forthcoming. VIRGINIA BANDA, CHARITY - CNP      *Note was creating using voice recognition software.   The document was reviewed however grammatical errors may exist.

## 2023-01-31 VITALS
RESPIRATION RATE: 18 BRPM | HEART RATE: 66 BPM | OXYGEN SATURATION: 97 % | DIASTOLIC BLOOD PRESSURE: 68 MMHG | SYSTOLIC BLOOD PRESSURE: 106 MMHG | TEMPERATURE: 98 F

## 2023-01-31 NOTE — PROGRESS NOTES
[facility] [address] [facility] Note       Patient:  Adrian Bajwa 80 y.o. male     Date of Service: 23  : 1934    Patient states he has no complaints today. Denies any chest pain, SOB, fever, chills, issues with moving his bowels, or urinating. Past Medical History:      Diagnosis Date    Dementia ([address]     Gout     Hypotension     Hypothyroidism        PastSurgical History:        Procedure Laterality Date    FEMUR FRACTURE SURGERY Left 3/16/2022    LEFT FEMUR PERIPROSTHETIC FRACTURE OPEN REDUCTION INTERNAL FIXATION performed by Austin Steward DO at [facility] OR       Allergies:    Patient has no known allergies. Review of Systems:   Review of Systems - as above     Physical Exam   Vitals: /68   Pulse 66   Temp 98 °F (36.7 °C)   Resp 18   SpO2 97%   Physical Exam  Constitutional:       General: He is not in acute distress. Appearance: Normal appearance. He is well-developed. HENT:      Head: Normocephalic and atraumatic. Eyes:      General:         Right eye: No discharge. Left eye: No discharge. Conjunctiva/sclera: Conjunctivae normal.   Neck:      Trachea: No tracheal deviation. Cardiovascular:      Rate and Rhythm: Normal rate and regular rhythm. Heart sounds: Murmur heard. Pulmonary:      Effort: Pulmonary effort is normal. No respiratory distress. Breath sounds: Normal breath sounds. No wheezing. Abdominal:      General: Bowel sounds are normal. There is no distension. Palpations: Abdomen is soft. Tenderness: There is no abdominal tenderness. Musculoskeletal:         General: No tenderness. Cervical back: Normal range of motion and neck supple. Skin:     General: Skin is warm and dry. Neurological:      Mental Status: He is alert. Psychiatric:         Behavior: Behavior normal.           Assessment and Plan       1. Dementia without behavioral disturbance (HCC)  -Stable, continue same    2. Hypotension, unspecified hypotension type  -Stable, continue same    3. Chronic gout without tophus, unspecified cause, unspecified site  -Stable, continue same    4. Anemia, unspecified type  -Stable, continue same    5. Hypothyroidism, unspecified type  -Stable, continue same    6. Coronary artery disease involving native heart without angina pectoris, unspecified vessel or lesion type  -Stable, continue same      Medication List:    Current Outpatient Medications   Medication Sig Dispense Refill    midodrine (PROAMATINE) 10 MG tablet Take 1 tablet by mouth 3 times daily as needed (low blood pressure) For blood pressure systolic greater than 511 30 tablet 0    colchicine (COLCRYS) 0.6 MG tablet Take 1 tablet by mouth daily 30 tablet 3    ferrous sulfate (IRON 325) 325 (65 Fe) MG tablet Take 1 tablet by mouth 2 times daily (with meals) 30 tablet 0    allopurinol (ZYLOPRIM) 100 MG tablet Take 1 tablet by mouth daily 30 tablet 0    metoprolol succinate (TOPROL XL) 25 MG extended release tablet Take 1 tablet by mouth in the morning and at bedtime 30 tablet 3    levothyroxine (SYNTHROID) 175 MCG tablet Take 1 tablet by mouth Daily 30 tablet 0    acetaminophen (TYLENOL) 500 MG tablet Take 500 mg by mouth every 6 hours as needed for Pain      folic acid (FOLVITE) 181 MCG tablet Take 400 mcg by mouth daily      Cholecalciferol (VITAMIN D) 10 MCG (400 UNIT) CAPS Capsule Take 50,000 Units by mouth daily      memantine ER (NAMENDA XR) 28 MG CP24 extended release capsule Take 28 mg by mouth daily      tamsulosin (FLOMAX) 0.4 MG capsule Take 0.8 mg by mouth daily      mirtazapine (REMERON) 15 MG tablet Take 7.5 mg by mouth nightly       No current facility-administered medications for this visit. Rena Quinn,        This document may have been prepared at least partially through the use of voice recognition software.  Although effort is taken to assure the accuracy ofthis document, it is possible that grammatical, syntax,  or spelling errors may occur.

## 2023-02-01 ENCOUNTER — OUTSIDE SERVICES (OUTPATIENT)
Dept: PRIMARY CARE CLINIC | Age: 88
End: 2023-02-01
Payer: COMMERCIAL

## 2023-02-01 DIAGNOSIS — E03.9 HYPOTHYROIDISM, UNSPECIFIED TYPE: ICD-10-CM

## 2023-02-01 DIAGNOSIS — F03.90 DEMENTIA WITHOUT BEHAVIORAL DISTURBANCE (HCC): Primary | ICD-10-CM

## 2023-02-01 DIAGNOSIS — D64.9 ANEMIA, UNSPECIFIED TYPE: ICD-10-CM

## 2023-02-01 DIAGNOSIS — M1A.9XX0 CHRONIC GOUT WITHOUT TOPHUS, UNSPECIFIED CAUSE, UNSPECIFIED SITE: ICD-10-CM

## 2023-02-01 DIAGNOSIS — I25.10 CORONARY ARTERY DISEASE INVOLVING NATIVE HEART WITHOUT ANGINA PECTORIS, UNSPECIFIED VESSEL OR LESION TYPE: ICD-10-CM

## 2023-02-01 DIAGNOSIS — I95.9 HYPOTENSION, UNSPECIFIED HYPOTENSION TYPE: ICD-10-CM

## 2023-02-01 PROCEDURE — 99348 HOME/RES VST EST LOW MDM 30: CPT | Performed by: STUDENT IN AN ORGANIZED HEALTH CARE EDUCATION/TRAINING PROGRAM

## 2023-02-07 VITALS
SYSTOLIC BLOOD PRESSURE: 110 MMHG | OXYGEN SATURATION: 97 % | HEART RATE: 66 BPM | TEMPERATURE: 98 F | RESPIRATION RATE: 18 BRPM | DIASTOLIC BLOOD PRESSURE: 68 MMHG

## 2023-02-07 NOTE — PROGRESS NOTES
[facility] [address] [facility] Note       Patient:  Trini Turner 80 y.o. male     Date of Service: 23  : 1934    Patient states he is doing well and has no complaints today. Denies fever, chills, chest pain, or SOB. Past Medical History:      Diagnosis Date    Dementia ([address]     Gout     Hypotension     Hypothyroidism        PastSurgical History:        Procedure Laterality Date    FEMUR FRACTURE SURGERY Left 3/16/2022    LEFT FEMUR PERIPROSTHETIC FRACTURE OPEN REDUCTION INTERNAL FIXATION performed by Reji Lucero DO at [facility] OR       Allergies:    Patient has no known allergies. Review of Systems:   Review of Systems - as above     Physical Exam   Vitals: /68   Pulse 66   Temp 98 °F (36.7 °C)   Resp 18   SpO2 97%   Physical Exam  Constitutional:       General: He is not in acute distress. Appearance: Normal appearance. He is well-developed. HENT:      Head: Normocephalic and atraumatic. Eyes:      General:         Right eye: No discharge. Left eye: No discharge. Conjunctiva/sclera: Conjunctivae normal.   Neck:      Trachea: No tracheal deviation. Cardiovascular:      Rate and Rhythm: Normal rate and regular rhythm. Heart sounds: Murmur heard. Pulmonary:      Effort: Pulmonary effort is normal. No respiratory distress. Breath sounds: Normal breath sounds. No wheezing. Abdominal:      General: Bowel sounds are normal. There is no distension. Palpations: Abdomen is soft. Tenderness: There is no abdominal tenderness. Musculoskeletal:         General: No tenderness. Cervical back: Normal range of motion and neck supple. Skin:     General: Skin is warm and dry. Neurological:      Mental Status: He is alert. Psychiatric:         Behavior: Behavior normal.           Assessment and Plan       1. Dementia without behavioral disturbance (HCC)  -Stable, continue same    2.  Hypotension, unspecified hypotension type  -Stable, continue same    3. Chronic gout without tophus, unspecified cause, unspecified site  -Stable, continue same    4. Anemia, unspecified type  -Stable, continue same    5. Hypothyroidism, unspecified type  -Stable, continue same    6. Coronary artery disease involving native heart without angina pectoris, unspecified vessel or lesion type  -Stable, continue same      Medication List:    Current Outpatient Medications   Medication Sig Dispense Refill    midodrine (PROAMATINE) 10 MG tablet Take 1 tablet by mouth 3 times daily as needed (low blood pressure) For blood pressure systolic greater than 864 30 tablet 0    colchicine (COLCRYS) 0.6 MG tablet Take 1 tablet by mouth daily 30 tablet 3    ferrous sulfate (IRON 325) 325 (65 Fe) MG tablet Take 1 tablet by mouth 2 times daily (with meals) 30 tablet 0    allopurinol (ZYLOPRIM) 100 MG tablet Take 1 tablet by mouth daily 30 tablet 0    metoprolol succinate (TOPROL XL) 25 MG extended release tablet Take 1 tablet by mouth in the morning and at bedtime 30 tablet 3    levothyroxine (SYNTHROID) 175 MCG tablet Take 1 tablet by mouth Daily 30 tablet 0    acetaminophen (TYLENOL) 500 MG tablet Take 500 mg by mouth every 6 hours as needed for Pain      folic acid (FOLVITE) 325 MCG tablet Take 400 mcg by mouth daily      Cholecalciferol (VITAMIN D) 10 MCG (400 UNIT) CAPS Capsule Take 50,000 Units by mouth daily      memantine ER (NAMENDA XR) 28 MG CP24 extended release capsule Take 28 mg by mouth daily      tamsulosin (FLOMAX) 0.4 MG capsule Take 0.8 mg by mouth daily      mirtazapine (REMERON) 15 MG tablet Take 7.5 mg by mouth nightly       No current facility-administered medications for this visit. Mk Wolff,        This document may have been prepared at least partially through the use of voice recognition software.  Although effort is taken to assure the accuracy ofthis document, it is possible that grammatical, syntax,  or spelling errors may occur.

## 2023-02-08 ENCOUNTER — OUTSIDE SERVICES (OUTPATIENT)
Dept: PRIMARY CARE CLINIC | Age: 88
End: 2023-02-08

## 2023-02-08 DIAGNOSIS — I95.9 HYPOTENSION, UNSPECIFIED HYPOTENSION TYPE: ICD-10-CM

## 2023-02-08 DIAGNOSIS — F03.90 DEMENTIA WITHOUT BEHAVIORAL DISTURBANCE (HCC): Primary | ICD-10-CM

## 2023-02-08 DIAGNOSIS — E03.9 HYPOTHYROIDISM, UNSPECIFIED TYPE: ICD-10-CM

## 2023-02-08 DIAGNOSIS — D64.9 ANEMIA, UNSPECIFIED TYPE: ICD-10-CM

## 2023-02-08 DIAGNOSIS — M1A.9XX0 CHRONIC GOUT WITHOUT TOPHUS, UNSPECIFIED CAUSE, UNSPECIFIED SITE: ICD-10-CM

## 2023-02-08 DIAGNOSIS — I25.10 CORONARY ARTERY DISEASE INVOLVING NATIVE HEART WITHOUT ANGINA PECTORIS, UNSPECIFIED VESSEL OR LESION TYPE: ICD-10-CM

## 2023-02-20 ENCOUNTER — OUTSIDE SERVICES (OUTPATIENT)
Dept: FAMILY MEDICINE CLINIC | Age: 88
End: 2023-02-20

## 2023-02-20 DIAGNOSIS — E03.9 HYPOTHYROIDISM, UNSPECIFIED TYPE: ICD-10-CM

## 2023-02-20 DIAGNOSIS — Z91.89 LACK OF MOTIVATION: Primary | ICD-10-CM

## 2023-02-20 DIAGNOSIS — M81.0 OSTEOPOROSIS, UNSPECIFIED OSTEOPOROSIS TYPE, UNSPECIFIED PATHOLOGICAL FRACTURE PRESENCE: ICD-10-CM

## 2023-02-20 DIAGNOSIS — E55.9 VITAMIN D DEFICIENCY: ICD-10-CM

## 2023-02-20 DIAGNOSIS — R97.20 ELEVATED PSA: ICD-10-CM

## 2023-02-21 NOTE — PROGRESS NOTES
2/20/2023    Kasey Hansen  5/23/1934    This resident is being seen today for a follow-up evaluation visit. He is a resident who has long-term medical conditions including hypertension, hypothyroidism, dementia with some degree of cognitive decline, frequent falls vitamin D deficiency, gout, respiratory failure with hypoxia in times past, rib fractures to the right side, anxiety disorder, major depressive disorder, moderate protein calorie malnutrition, diverticulitis, low back pain,. He is a 80 y.o. male resident who is being seen today for a follow-up evaluation with this resident remaining alert responsive to verbal and tactile stimuli. He does appear to be doing relatively well outside of staffing concern regarding his lack of ambition. He states that he is having no difficulties and denies any pain, headaches or dizziness, sore throat, chest pain, coughing or shortness of breath, nausea or vomiting, constipation or diarrhea, dysuria or frequency, fever or chills, falls or syncopal events. Medications:  Tamsulosin 0.8 mg daily  Vitamin D 50,000 units weekly  Levothyroxine 175 mcg daily  Midodrine 10 mg 3 times daily  Percocet 5-325 mg every 6 hours as needed  Iron 325 mg 2 times daily  Eliquis tablet 0.6 mg daily  Remeron 15 mg at bedtime  Namenda 28 mg daily  Zyloprim 100 mg daily  Lumigan 0.01% to both eyes at bedtime  Folic acid 4 mcg/day  Simbrinza 1-0.2% 1 drop to the left eye 3 times a day            Objective     Vital Signs: /60 pulse 66 respirations 18 temperature 98 O2 97% weight 134 pounds      Physical examination:Skin is essentially warm and dry. HEENT unremarkable. Neck is supple. Heart regular rate and rhythm. No rubs, gallops or murmurs noted. Lungs are consistent with some degree of Rales most notably to his right lower lobe. . Abdomen is soft and supple and non-tender. Bowel sounds are noted x4 quadrants. No rigidity, guarding or rebound tenderness.   Negative Nelson's, negative McBurney's, negative Rajendra's. Extremities; no true pitting edema. Pulses are adequate. No clubbing  or no cyanosis noted. He does have some modest motion restriction with respect to the shoulders with abduction and extension. There is some degree of venous insufficiency to the lower extremities, without appreciable change. Ace wraps intact to the left lower extremity with a hinged knee brace. Neurologically he  is alert and oriented x2. No evidence of paralysis or paresthesias noted. Diagnoses and all orders for this visit:    Lack of motivation  Comments:  Resides here in the memory care unit and does have the benefits of a wheelchair as needed    Hypothyroidism, unspecified type  Comments:  Controlled with levothyroxine    Osteoporosis, unspecified osteoporosis type, unspecified pathological fracture presence  Comments:  Currently stable with vitamin supplementation    Vitamin D deficiency  Comments:  Stable with vitamin D    Elevated PSA  Comments:  Stable tamsulosin                     Plan: Plan of care was discussed with the healthcare team with meds and labs reviewed. He is doing relatively well at this time and does maintain the benefit of his current medical regimen as directed. He is tolerating this in a satisfactory manner. He does currently have recommendations for uric acid level every 6 months with a vitamin D and TSH annually. He does continue with the benefit of a regular diet which she is tolerating in a satisfactory manner. I will therefore continue forth with his current plan of care and I will see this gentleman routinely and as needed with further orders forthcoming. VIRGINIA BANDA, CHARITY - CNP      *Note was creating using voice recognition software.   The document was reviewed however grammatical errors may exist.

## 2023-03-13 ENCOUNTER — OUTSIDE SERVICES (OUTPATIENT)
Dept: FAMILY MEDICINE CLINIC | Age: 88
End: 2023-03-13
Payer: COMMERCIAL

## 2023-03-13 DIAGNOSIS — I10 HYPERTENSION, UNSPECIFIED TYPE: ICD-10-CM

## 2023-03-13 DIAGNOSIS — K59.00 CONSTIPATION, UNSPECIFIED CONSTIPATION TYPE: Primary | ICD-10-CM

## 2023-03-13 DIAGNOSIS — D50.9 IRON DEFICIENCY ANEMIA, UNSPECIFIED IRON DEFICIENCY ANEMIA TYPE: ICD-10-CM

## 2023-03-13 DIAGNOSIS — E03.9 HYPOTHYROIDISM, UNSPECIFIED TYPE: ICD-10-CM

## 2023-03-13 DIAGNOSIS — Z91.81 HISTORY OF FALLING: ICD-10-CM

## 2023-03-13 PROCEDURE — 99349 HOME/RES VST EST MOD MDM 40: CPT | Performed by: NURSE PRACTITIONER

## 2023-03-13 NOTE — PROGRESS NOTES
3/13/2023    Sekousnow Moseley  5/23/1934    This resident is being seen today for a follow-up evaluation visit. He is a resident who has long-term medical conditions including hypertension, hypothyroidism, dementia with some degree of cognitive decline, frequent falls vitamin D deficiency, gout, respiratory failure with hypoxia in times past, rib fractures to the right side, anxiety disorder, major depressive disorder, moderate protein calorie malnutrition, diverticulitis, low back pain,. He is a 80 y.o. male resident who is being seen today for a follow-up visit with this resident residing in assisted living and doing relatively well but does become irritable from time to time. He does have the benefit of a walker but essentially self propels with respect to a wheelchair for longer distances. He offers no acute complaints on today's evaluation regarding pain, headaches or dizziness, sore throat, chest pain, coughing or shortness of breath, nausea or vomiting, constipation or diarrhea, dysuria or frequency, fever or chills, falls or syncopal events. Medications:  Tamsulosin 0.8 mg daily  Vitamin D 50,000 units weekly  Levothyroxine 175 mcg daily  Midodrine 10 mg 3 times daily  Percocet 5-325 mg every 6 hours as needed  Iron 325 mg 2 times daily  Eliquis tablet 0.6 mg daily  Remeron 15 mg at bedtime  Namenda 28 mg daily  Zyloprim 100 mg daily  Lumigan 0.01% to both eyes at bedtime  Folic acid 4 mcg/day  Simbrinza 1-0.2% 1 drop to the left eye 3 times a day            Objective     Vital Signs: /68 pulse 66 respirations 18 temperature 98 O2 97% weight 134 pounds      Physical examination:Skin is essentially warm and dry. HEENT unremarkable. Neck is supple. Heart regular rate and rhythm. No rubs, gallops or murmurs noted. Lungs are consistent with some degree of Rales most notably to his right lower lobe. . Abdomen is soft and supple and non-tender. Bowel sounds are noted x4 quadrants.  No rigidity, guarding or rebound tenderness. Negative Nelson's, negative McBurney's, negative Rajendra's. Extremities; no true pitting edema. Pulses are adequate. No clubbing  or no cyanosis noted. He does have some modest motion restriction with respect to the shoulders with abduction and extension. There is some degree of venous insufficiency to the lower extremities, without appreciable change. Ace wraps intact to the left lower extremity with a hinged knee brace. Neurologically he  is alert and oriented x2. No evidence of paralysis or paresthesias noted. Diagnoses and all orders for this visit:    Constipation, unspecified constipation type  Comments:  Currently controlled    History of falling  Comments:  Stable at this point in time    Hypertension, unspecified type  Comments:  Controlled at this point    Iron deficiency anemia, unspecified iron deficiency anemia type  Comments:  Currently stable with iron supplementation    Hypothyroidism, unspecified type  Comments:  Controlled with Synthroid                       Plan: Plan of care was discussed with the healthcare team with meds and labs reviewed. She does appear to be doing relatively well at this point in time and Staff indicates that he has been tolerating his medications in a satisfactory manner. He is good to have close observation of his blood work as indicated. He will maintain the benefit of his regular diet at this point and I will continue to track his intakes, monitor his weights and behaviors, and see him routinely and as needed with further orders forthcoming. VIRGINIA BANDA, CHARITY - CNP      *Note was creating using voice recognition software.   The document was reviewed however grammatical errors may exist.

## 2023-03-27 ENCOUNTER — OUTSIDE SERVICES (OUTPATIENT)
Dept: FAMILY MEDICINE CLINIC | Age: 88
End: 2023-03-27
Payer: COMMERCIAL

## 2023-03-27 DIAGNOSIS — M81.0 OSTEOPOROSIS, UNSPECIFIED OSTEOPOROSIS TYPE, UNSPECIFIED PATHOLOGICAL FRACTURE PRESENCE: ICD-10-CM

## 2023-03-27 DIAGNOSIS — F05 SUNDOWN SYNDROME: ICD-10-CM

## 2023-03-27 DIAGNOSIS — R97.20 ELEVATED PSA: ICD-10-CM

## 2023-03-27 DIAGNOSIS — E53.8 FOLATE DEFICIENCY: ICD-10-CM

## 2023-03-27 DIAGNOSIS — E55.9 VITAMIN D DEFICIENCY: Primary | ICD-10-CM

## 2023-03-27 PROCEDURE — 99349 HOME/RES VST EST MOD MDM 40: CPT | Performed by: NURSE PRACTITIONER

## 2023-03-27 NOTE — PROGRESS NOTES
3/27/2023    Te Almanza  5/23/1934    This resident is being seen today for a follow-up evaluation visit. He is a resident who has long-term medical conditions including hypertension, hypothyroidism, dementia with some degree of cognitive decline, frequent falls vitamin D deficiency, gout, respiratory failure with hypoxia in times past, rib fractures to the right side, anxiety disorder, major depressive disorder, moderate protein calorie malnutrition, diverticulitis, low back pain,. He is a 80 y.o. male resident who is being seen today for a follow-up evaluation with resident residing in the assisted living and states that he feels that he is doing relatively well. He currently denies any complaints regarding pain, headaches or dizziness, sore throat, chest pain, coughing or shortness of breath, nausea or vomiting, constipation or diarrhea, dysuria or frequency, fever or chills, falls or syncopal events. Staff indicates that he does become irritable from time to time but has had no aggressive, combative, or disruptive behaviors. He does biomonitor self-propel with respect to a wheelchair but is capable of utilizing a walker. Medications:  Tamsulosin 0.8 mg daily  Vitamin D 50,000 units weekly  Levothyroxine 175 mcg daily  Midodrine 10 mg 3 times daily  Percocet 5-325 mg every 6 hours as needed  Iron 325 mg 2 times daily  Eliquis tablet 0.6 mg daily  Remeron 15 mg at bedtime  Namenda 28 mg daily  Zyloprim 100 mg daily  Lumigan 0.01% to both eyes at bedtime  Folic acid 4 mcg/day  Simbrinza 1-0.2% 1 drop to the left eye 3 times a day            Objective     Vital Signs: /68 pulse 66 respirations 18 temperature 98 O2 97% weight 134 pounds      Physical examination:Skin is essentially warm and dry. HEENT unremarkable. Neck is supple. Heart regular rate and rhythm. No rubs, gallops or murmurs noted. Lungs are consistent with some degree of Rales most notably to his right lower lobe. Genaro San

## 2023-04-26 ENCOUNTER — OUTSIDE SERVICES (OUTPATIENT)
Dept: PRIMARY CARE CLINIC | Age: 88
End: 2023-04-26

## 2023-04-26 DIAGNOSIS — E03.9 HYPOTHYROIDISM, UNSPECIFIED TYPE: ICD-10-CM

## 2023-04-26 DIAGNOSIS — M1A.9XX0 CHRONIC GOUT WITHOUT TOPHUS, UNSPECIFIED CAUSE, UNSPECIFIED SITE: ICD-10-CM

## 2023-04-26 DIAGNOSIS — I95.9 HYPOTENSION, UNSPECIFIED HYPOTENSION TYPE: ICD-10-CM

## 2023-04-26 DIAGNOSIS — I25.10 CORONARY ARTERY DISEASE INVOLVING NATIVE HEART WITHOUT ANGINA PECTORIS, UNSPECIFIED VESSEL OR LESION TYPE: ICD-10-CM

## 2023-04-26 DIAGNOSIS — D64.9 ANEMIA, UNSPECIFIED TYPE: ICD-10-CM

## 2023-04-26 DIAGNOSIS — F03.90 DEMENTIA WITHOUT BEHAVIORAL DISTURBANCE (HCC): Primary | ICD-10-CM

## 2023-04-27 NOTE — PROGRESS NOTES
[facility] [address] [facility] Note       Patient:  Manny Lee 80 y.o. male     Date of Service: 23  : 1934    Patient states he has no complaints today. Denies fever, chills, chest pain, or SOB. Past Medical History:      Diagnosis Date    Dementia ([address]     Gout     Hypotension     Hypothyroidism        PastSurgical History:        Procedure Laterality Date    FEMUR FRACTURE SURGERY Left 3/16/2022    LEFT FEMUR PERIPROSTHETIC FRACTURE OPEN REDUCTION INTERNAL FIXATION performed by Patricia Potter DO at [facility] OR       Allergies:    Patient has no known allergies. Review of Systems:   Review of Systems - as above     Physical Exam   Vitals: Stable  Physical Exam  Constitutional:       General: He is not in acute distress. Appearance: Normal appearance. He is well-developed. HENT:      Head: Normocephalic and atraumatic. Eyes:      General:         Right eye: No discharge. Left eye: No discharge. Conjunctiva/sclera: Conjunctivae normal.   Neck:      Trachea: No tracheal deviation. Cardiovascular:      Rate and Rhythm: Normal rate and regular rhythm. Heart sounds: Murmur heard. Pulmonary:      Effort: Pulmonary effort is normal. No respiratory distress. Breath sounds: Normal breath sounds. No wheezing. Abdominal:      General: Bowel sounds are normal. There is no distension. Palpations: Abdomen is soft. Tenderness: There is no abdominal tenderness. Musculoskeletal:         General: No tenderness. Cervical back: Normal range of motion and neck supple. Skin:     General: Skin is warm and dry. Neurological:      Mental Status: He is alert. Psychiatric:         Behavior: Behavior normal.           Assessment and Plan       1. Dementia without behavioral disturbance (HCC)  -Stable, continue same    2.  Hypotension, unspecified hypotension type  -Stable, continue same, midodrine parameters taken off as he always

## 2023-05-15 ENCOUNTER — OUTSIDE SERVICES (OUTPATIENT)
Dept: FAMILY MEDICINE CLINIC | Age: 88
End: 2023-05-15
Payer: COMMERCIAL

## 2023-05-15 DIAGNOSIS — R97.20 ELEVATED PSA: ICD-10-CM

## 2023-05-15 DIAGNOSIS — E55.9 VITAMIN D DEFICIENCY: ICD-10-CM

## 2023-05-15 DIAGNOSIS — F05 SUNDOWN SYNDROME: Primary | ICD-10-CM

## 2023-05-15 DIAGNOSIS — K59.00 CONSTIPATION, UNSPECIFIED CONSTIPATION TYPE: ICD-10-CM

## 2023-05-15 DIAGNOSIS — M81.0 OSTEOPOROSIS, UNSPECIFIED OSTEOPOROSIS TYPE, UNSPECIFIED PATHOLOGICAL FRACTURE PRESENCE: ICD-10-CM

## 2023-05-15 PROCEDURE — 99349 HOME/RES VST EST MOD MDM 40: CPT | Performed by: NURSE PRACTITIONER

## 2023-05-16 NOTE — PROGRESS NOTES
5/15/2023    [facility]  5/23/1934    This resident is being seen today for a follow-up evaluation visit. He is a resident who has long-term medical conditions including hypertension, hypothyroidism, dementia with some degree of cognitive decline, frequent falls vitamin D deficiency, gout, respiratory failure with hypoxia in times past, rib fractures to the right side, anxiety disorder, major depressive disorder, moderate protein calorie malnutrition, diverticulitis, low back pain,. He is a 80 y.o. male resident who is being seen today for a follow-up evaluation for this resident residing in assisted living and offers no acute complaints on today's evaluation. He denies any headaches or dizziness, sore throat, chest pain, coughing or shortness of breath, nausea or vomiting, constipation or diarrhea, fever or chills, falls or syncopal events. Staff does indicate that he has been doing relatively well but they do have concerns regarding possible elopement with which all staff numbers are aware. Preventative measures are in place. Medications:  Tamsulosin 0.8 mg daily  Vitamin D 50,000 units weekly  Levothyroxine 175 mcg daily  Midodrine 10 mg 3 times daily  Percocet 5-325 mg every 6 hours as needed  Iron 325 mg 2 times daily  Eliquis tablet 0.6 mg daily  Remeron 15 mg at bedtime  Namenda 28 mg daily  Zyloprim 100 mg daily  Lumigan 0.01% to both eyes at bedtime  Folic acid 4 mcg/day  Simbrinza 1-0.2% 1 drop to the left eye 3 times a day            Objective     Vital Signs: /70 pulse 66 respirations 18 temperature 98 O2 97% weight 130.2 pounds      Physical examination:Skin is essentially warm and dry. HEENT unremarkable. Neck is supple. Heart regular rate and rhythm. No rubs, gallops or murmurs noted. Lungs are diminished and essentially clear to auscultation without evidence of wheezing noted. Abdomen is soft and supple and non-tender. Bowel sounds are noted x4 quadrants.  No

## 2023-05-31 ENCOUNTER — OUTSIDE SERVICES (OUTPATIENT)
Dept: PRIMARY CARE CLINIC | Age: 88
End: 2023-05-31

## 2023-05-31 DIAGNOSIS — R53.81 PHYSICAL DECONDITIONING: ICD-10-CM

## 2023-05-31 DIAGNOSIS — E87.6 HYPOKALEMIA: ICD-10-CM

## 2023-05-31 DIAGNOSIS — D64.9 ANEMIA, UNSPECIFIED TYPE: ICD-10-CM

## 2023-05-31 DIAGNOSIS — R53.1 WEAKNESS: ICD-10-CM

## 2023-05-31 DIAGNOSIS — R06.02 SHORTNESS OF BREATH: ICD-10-CM
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
## 2023-05-31 NOTE — PROGRESS NOTES
[facility] of [address] [place] [address] History and Physical       Patient:  Flaca Hale 80 y.o. male     Date of Service: 23  : 1934    Hospital course: Patient admitted for reoccurring falls with most previous fall causing a lot of swelling and bruising to his head area. In the ED, patient was found to be dehydrated and weak and was thus admitted for further evaluation and IV fluids. During his admission, imaging of his right maxillary sinus showed concerns for hemorrhage, thus neuro and ENT were consulted, and they found patient to not be a surgical candidate and to just monitor conservatively. Patient responds to the IV fluids well, was found to have a UTI which was treated with rocephin and responded well. During his course, patient was SOB and required oxygen and was found to be fluid overloaded and thus was given lasix and responded to this well and was titrated off the oxygen. Patient was found appropriate for SNF, and was thus discharged to our facility. Currently, patient states he is doing ok, however is somewhat short of breath. Otherwise, he is feeling well. Denies any chest pain or pain else where, fever, chills, or abdominal pain. Past Medical History:      Diagnosis Date    Dementia ([address]     Gout     Hypotension     Hypothyroidism        PastSurgical History:        Procedure Laterality Date    FEMUR FRACTURE SURGERY Left 3/16/2022    LEFT FEMUR PERIPROSTHETIC FRACTURE OPEN REDUCTION INTERNAL FIXATION performed by Janet Calix DO at [facility] [place]       Allergies:    Patient has no known allergies. Review of Systems:   Review of Systems - as above     Physical Exam   Vitals: Stable   Physical Exam  Constitutional:       Appearance: He is well-developed. HENT:      Head: Normocephalic and atraumatic. Eyes:      General:         Right eye: No discharge. Left eye: No discharge.       Conjunctiva/sclera: Conjunctivae normal.

## 2023-06-07 ENCOUNTER — OUTSIDE SERVICES (OUTPATIENT)
Dept: PRIMARY CARE CLINIC | Age: 88
End: 2023-06-07

## 2023-06-07 ENCOUNTER — HOSPITAL ENCOUNTER (INPATIENT)
Dept: HOSPITAL 83 - ED | Age: 88
LOS: 3 days | Discharge: NURSING, REHAB OR LONG-TERM CARE | DRG: 291 | End: 2023-06-10
Attending: INTERNAL MEDICINE | Admitting: INTERNAL MEDICINE
Payer: MEDICARE

## 2023-06-07 VITALS — DIASTOLIC BLOOD PRESSURE: 61 MMHG | SYSTOLIC BLOOD PRESSURE: 116 MMHG

## 2023-06-07 VITALS — HEIGHT: 72 IN

## 2023-06-07 VITALS — DIASTOLIC BLOOD PRESSURE: 55 MMHG

## 2023-06-07 DIAGNOSIS — K59.00: ICD-10-CM

## 2023-06-07 DIAGNOSIS — I50.9: ICD-10-CM

## 2023-06-07 DIAGNOSIS — F03.90: ICD-10-CM

## 2023-06-07 DIAGNOSIS — E03.9: ICD-10-CM

## 2023-06-07 DIAGNOSIS — Z79.899: ICD-10-CM

## 2023-06-07 DIAGNOSIS — J90: ICD-10-CM

## 2023-06-07 DIAGNOSIS — Z66: ICD-10-CM

## 2023-06-07 DIAGNOSIS — J96.01: ICD-10-CM

## 2023-06-07 DIAGNOSIS — E44.0: ICD-10-CM

## 2023-06-07 DIAGNOSIS — I11.0: Primary | ICD-10-CM

## 2023-06-07 DIAGNOSIS — Z79.1: ICD-10-CM

## 2023-06-07 DIAGNOSIS — E87.6: ICD-10-CM

## 2023-06-07 DIAGNOSIS — R10.84 GENERALIZED ABDOMINAL PAIN: Primary | ICD-10-CM

## 2023-06-07 DIAGNOSIS — Z51.5: ICD-10-CM

## 2023-06-07 DIAGNOSIS — Z79.82: ICD-10-CM

## 2023-06-07 LAB
ALP SERPL-CCNC: 209 U/L (ref 46–116)
ALT SERPL W P-5'-P-CCNC: < 7 U/L (ref 10–49)
APTT PPP: 29.1 SECONDS (ref 20–32.1)
BACTERIA #/AREA URNS HPF: (no result) /[HPF]
BASOPHILS # BLD AUTO: 0.1 10*3/UL (ref 0–0.1)
BASOPHILS NFR BLD AUTO: 0.6 % (ref 0–1)
BUN SERPL-MCNC: 12 MG/DL (ref 9–23)
CHLORIDE SERPL-SCNC: 109 MMOL/L (ref 98–107)
EOSINOPHIL # BLD AUTO: 0.4 10*3/UL (ref 0–0.4)
EOSINOPHIL # BLD AUTO: 3.5 % (ref 1–4)
EPI CELLS #/AREA URNS HPF: (no result) /[HPF]
ERYTHROCYTE [DISTWIDTH] IN BLOOD BY AUTOMATED COUNT: 16.6 % (ref 0–14.5)
HCT VFR BLD AUTO: 35.3 % (ref 42–52)
HGB UR QL STRIP: (no result)
INR BLD: 1.2 (ref 2–3.5)
LIPASE SERPL-CCNC: 20 U/L (ref 12–53)
LYMPHOCYTES # BLD AUTO: 1.6 10*3/UL (ref 1.3–4.4)
LYMPHOCYTES NFR BLD AUTO: 14.3 % (ref 27–41)
MCH RBC QN AUTO: 32.1 PG (ref 27–31)
MCHC RBC AUTO-ENTMCNC: 31.7 G/DL (ref 33–37)
MCV RBC AUTO: 101.1 FL (ref 80–94)
MONOCYTES # BLD AUTO: 1.3 10*3/UL (ref 0.1–1)
MONOCYTES NFR BLD MANUAL: 11.9 % (ref 3–9)
NEUT #: 7.5 10*3/UL (ref 2.3–7.9)
NEUT %: 68.8 % (ref 47–73)
NRBC BLD QL AUTO: 0 10*3/UL (ref 0–0)
PH UR STRIP: 5.5 [PH] (ref 4.5–8)
PLATELET # BLD AUTO: 269 10*3/UL (ref 130–400)
PMV BLD AUTO: 10.3 FL (ref 9.6–12.3)
POTASSIUM SERPL-SCNC: 3.1 MMOL/L (ref 3.4–5.1)
PROT SERPL-MCNC: 5.1 GM/DL (ref 6–8)
RBC # BLD AUTO: 3.49 10*6/UL (ref 4.5–5.9)
RBC #/AREA URNS HPF: (no result) RBC/HPF (ref 0–2)
SP GR UR: 1.02 (ref 1–1.03)
UROBILINOGEN UR STRIP-MCNC: 1 E.U./DL (ref 0–1)
WBC #/AREA URNS HPF: (no result) WBC/HPF (ref 0–5)
WBC NRBC COR # BLD AUTO: 10.8 10*3/UL (ref 4.8–10.8)

## 2023-06-07 NOTE — PROGRESS NOTES
[facility] [address] [facility] Progress Note       Patient:  Lillian Penaloza 80 y.o. male     Date of Service: 23  : 1934    Patient states he is having some abdominal pain today around his waist area. Denies any chest pain, fever, chills, nausea, or vomiting. Per nursing, patient has not had a bowel movement since 6/3/2023. Past Medical History:      Diagnosis Date    Dementia ([address]     Gout     Hypotension     Hypothyroidism        PastSurgical History:        Procedure Laterality Date    FEMUR FRACTURE SURGERY Left 3/16/2022    LEFT FEMUR PERIPROSTHETIC FRACTURE OPEN REDUCTION INTERNAL FIXATION performed by Erik Cota DO at [facility] [place]       Allergies:    Patient has no known allergies. Review of Systems:   Review of Systems - as above     Physical Exam   Vitals: Stable   Physical Exam  Constitutional:       Appearance: He is well-developed. HENT:      Head: Normocephalic and atraumatic. Eyes:      General:         Right eye: No discharge. Left eye: No discharge. Conjunctiva/sclera: Conjunctivae normal.   Neck:      Trachea: No tracheal deviation. Cardiovascular:      Rate and Rhythm: Normal rate and regular rhythm. Heart sounds: Murmur heard. Pulmonary:      Effort: Pulmonary effort is normal. No respiratory distress. Breath sounds: No wheezing. Comments: Diminished on left  Abdominal:      General: Bowel sounds are normal. There is no distension. Tenderness: There is abdominal tenderness. Comments: Abdomen firm, mild tenderness, general   Musculoskeletal:         General: No tenderness. Cervical back: Normal range of motion and neck supple. Skin:     General: Skin is warm and dry. Neurological:      Mental Status: He is alert. Psychiatric:         Behavior: Behavior normal.           Assessment and Plan       1.  Generalized abdominal pain  -New issue, has not had BM for 3-4 days, abdomen is

## 2023-06-08 VITALS — DIASTOLIC BLOOD PRESSURE: 59 MMHG

## 2023-06-08 VITALS — DIASTOLIC BLOOD PRESSURE: 60 MMHG

## 2023-06-08 VITALS — DIASTOLIC BLOOD PRESSURE: 69 MMHG

## 2023-06-08 VITALS — DIASTOLIC BLOOD PRESSURE: 55 MMHG

## 2023-06-08 VITALS — DIASTOLIC BLOOD PRESSURE: 65 MMHG

## 2023-06-08 VITALS — SYSTOLIC BLOOD PRESSURE: 110 MMHG | DIASTOLIC BLOOD PRESSURE: 58 MMHG

## 2023-06-08 VITALS — DIASTOLIC BLOOD PRESSURE: 67 MMHG

## 2023-06-08 LAB
ALP SERPL-CCNC: 234 U/L (ref 46–116)
ALT SERPL W P-5'-P-CCNC: < 7 U/L (ref 10–49)
BASOPHILS # BLD AUTO: 0 10*3/UL (ref 0–0.1)
BASOPHILS NFR BLD AUTO: 0.3 % (ref 0–1)
BUN SERPL-MCNC: 11 MG/DL (ref 9–23)
CHLORIDE SERPL-SCNC: 109 MMOL/L (ref 98–107)
EOSINOPHIL # BLD AUTO: 0.4 10*3/UL (ref 0–0.4)
EOSINOPHIL # BLD AUTO: 3.1 % (ref 1–4)
ERYTHROCYTE [DISTWIDTH] IN BLOOD BY AUTOMATED COUNT: 16.6 % (ref 0–14.5)
HCT VFR BLD AUTO: 39.3 % (ref 42–52)
LYMPHOCYTES # BLD AUTO: 1.2 10*3/UL (ref 1.3–4.4)
LYMPHOCYTES NFR BLD AUTO: 9.6 % (ref 27–41)
MCH RBC QN AUTO: 31.9 PG (ref 27–31)
MCHC RBC AUTO-ENTMCNC: 32.3 G/DL (ref 33–37)
MCV RBC AUTO: 98.7 FL (ref 80–94)
MONOCYTES # BLD AUTO: 1.4 10*3/UL (ref 0.1–1)
MONOCYTES NFR BLD MANUAL: 11.3 % (ref 3–9)
NEUT #: 9.4 10*3/UL (ref 2.3–7.9)
NEUT %: 74.8 % (ref 47–73)
NRBC BLD QL AUTO: 0 % (ref 0–0)
PLATELET # BLD AUTO: 310 10*3/UL (ref 130–400)
PMV BLD AUTO: 11 FL (ref 9.6–12.3)
POTASSIUM SERPL-SCNC: 3.9 MMOL/L (ref 3.4–5.1)
PROT SERPL-MCNC: 5.7 GM/DL (ref 6–8)
RBC # BLD AUTO: 3.98 10*6/UL (ref 4.5–5.9)
T4 FREE SERPL-MCNC: 1.12 NG/DL (ref 0.89–1.76)
TSH SERPL DL<=0.005 MIU/L-ACNC: 26.73 UIU/ML (ref 0.55–4.78)
WBC NRBC COR # BLD AUTO: 12.5 10*3/UL (ref 4.8–10.8)

## 2023-06-09 VITALS — DIASTOLIC BLOOD PRESSURE: 70 MMHG

## 2023-06-09 VITALS — SYSTOLIC BLOOD PRESSURE: 113 MMHG | DIASTOLIC BLOOD PRESSURE: 64 MMHG

## 2023-06-09 VITALS — SYSTOLIC BLOOD PRESSURE: 106 MMHG | DIASTOLIC BLOOD PRESSURE: 70 MMHG

## 2023-06-09 VITALS — DIASTOLIC BLOOD PRESSURE: 68 MMHG | SYSTOLIC BLOOD PRESSURE: 136 MMHG

## 2023-06-09 VITALS — DIASTOLIC BLOOD PRESSURE: 67 MMHG

## 2023-06-09 LAB
ALP SERPL-CCNC: 242 U/L (ref 46–116)
ALT SERPL W P-5'-P-CCNC: < 7 U/L (ref 10–49)
BUN SERPL-MCNC: 12 MG/DL (ref 9–23)
CHLORIDE SERPL-SCNC: 105 MMOL/L (ref 98–107)
ERYTHROCYTE [DISTWIDTH] IN BLOOD BY AUTOMATED COUNT: 16.2 % (ref 0–14.5)
HCT VFR BLD AUTO: 38.8 % (ref 42–52)
MACROCYTES BLD QL SMEAR: SLIGHT
MANUAL DIFFERENTIAL PERFORMED BLD QL: YES
MCH RBC QN AUTO: 32 PG (ref 27–31)
MCHC RBC AUTO-ENTMCNC: 32.7 G/DL (ref 33–37)
MCV RBC AUTO: 97.7 FL (ref 80–94)
NRBC BLD QL AUTO: 0 10*3/UL (ref 0–0)
OVALOCYTES BLD QL SMEAR: (no result)
PLATELET # BLD AUTO: 271 10*3/UL (ref 130–400)
PLATELET SUFFICIENCY: NORMAL
PMV BLD AUTO: 10.6 FL (ref 9.6–12.3)
POLYCHROMASIA BLD QL SMEAR: SLIGHT
POTASSIUM SERPL-SCNC: 3.3 MMOL/L (ref 3.4–5.1)
PROT SERPL-MCNC: 5.7 GM/DL (ref 6–8)
RBC # BLD AUTO: 3.97 10*6/UL (ref 4.5–5.9)
TOTAL CELLS COUNTED: 100 #CELLS
WBC NRBC COR # BLD AUTO: 12.4 10*3/UL (ref 4.8–10.8)

## 2023-06-10 VITALS — SYSTOLIC BLOOD PRESSURE: 111 MMHG | DIASTOLIC BLOOD PRESSURE: 63 MMHG

## 2023-06-10 VITALS — DIASTOLIC BLOOD PRESSURE: 73 MMHG

## 2023-06-10 VITALS — DIASTOLIC BLOOD PRESSURE: 58 MMHG

## 2023-06-10 LAB
ALP SERPL-CCNC: 254 U/L (ref 46–116)
ALT SERPL W P-5'-P-CCNC: < 7 U/L (ref 10–49)
BUN SERPL-MCNC: 12 MG/DL (ref 9–23)
BURR CELLS BLD QL SMEAR: (no result)
CHLORIDE SERPL-SCNC: 107 MMOL/L (ref 98–107)
ERYTHROCYTE [DISTWIDTH] IN BLOOD BY AUTOMATED COUNT: 16.3 % (ref 0–14.5)
HCT VFR BLD AUTO: 40.8 % (ref 42–52)
MACROCYTES BLD QL SMEAR: SLIGHT
MANUAL DIFFERENTIAL PERFORMED BLD QL: YES
MCH RBC QN AUTO: 31.7 PG (ref 27–31)
MCHC RBC AUTO-ENTMCNC: 31.6 G/DL (ref 33–37)
MCV RBC AUTO: 100.2 FL (ref 80–94)
NRBC BLD QL AUTO: 0 10*3/UL (ref 0–0)
PLATELET # BLD AUTO: 285 10*3/UL (ref 130–400)
PLATELET SUFFICIENCY: NORMAL
PMV BLD AUTO: 11.4 FL (ref 9.6–12.3)
POLYCHROMASIA BLD QL SMEAR: SLIGHT
POTASSIUM SERPL-SCNC: 3.9 MMOL/L (ref 3.4–5.1)
PROT SERPL-MCNC: 6 GM/DL (ref 6–8)
RBC # BLD AUTO: 4.07 10*6/UL (ref 4.5–5.9)
TOTAL CELLS COUNTED: 100 #CELLS
WBC NRBC COR # BLD AUTO: 11.6 10*3/UL (ref 4.8–10.8)

## 2023-06-20 ENCOUNTER — OUTSIDE SERVICES (OUTPATIENT)
Dept: PRIMARY CARE CLINIC | Age: 88
End: 2023-06-20

## 2023-06-20 DIAGNOSIS — I95.9 HYPOTENSION, UNSPECIFIED HYPOTENSION TYPE: ICD-10-CM

## 2023-06-20 DIAGNOSIS — F03.90 DEMENTIA WITHOUT BEHAVIORAL DISTURBANCE (HCC): ICD-10-CM

## 2023-06-20 DIAGNOSIS — R41.0 CONFUSION: Primary | ICD-10-CM

## 2023-06-20 NOTE — PROGRESS NOTES
[facility] [address] [facility] Progress Note       Patient:  Albert Hdz 80 y.o. male     Date of Service: 23  : 1934    Patient states he has no complaints today. Denies any fever, chills, or SOB or chest pain. Per nursing, patient was in extreme pain last week, was going to get evaluated, however then decided to go hospice. Past Medical History:      Diagnosis Date    Dementia ([address]     Gout     Hypotension     Hypothyroidism        PastSurgical History:        Procedure Laterality Date    FEMUR FRACTURE SURGERY Left 3/16/2022    LEFT FEMUR PERIPROSTHETIC FRACTURE OPEN REDUCTION INTERNAL FIXATION performed by Nikki Branch DO at [facility] OR       Allergies:    Patient has no known allergies. Review of Systems:   Review of Systems - as above     Physical Exam   Vitals: Stable   Physical Exam  Constitutional:       Appearance: He is well-developed. HENT:      Head: Normocephalic and atraumatic. Eyes:      General:         Right eye: No discharge. Left eye: No discharge. Conjunctiva/sclera: Conjunctivae normal.   Neck:      Trachea: No tracheal deviation. Cardiovascular:      Rate and Rhythm: Normal rate and regular rhythm. Heart sounds: Murmur heard. Pulmonary:      Effort: Pulmonary effort is normal. No respiratory distress. Breath sounds: No wheezing. Comments: Diminished on left  Abdominal:      General: Bowel sounds are normal. There is no distension. Tenderness: There is no abdominal tenderness. Musculoskeletal:      Cervical back: Normal range of motion and neck supple. Skin:     General: Skin is warm and dry. Neurological:      Mental Status: He is alert. Psychiatric:      Comments: More lucid today           Assessment and Plan         1. Confusion  -Improved, now with hospice, will follow    2.  Dementia without behavioral disturbance (HCC)  -Confusion improved, not worsening, continue

## 2023-06-29 ENCOUNTER — OUTSIDE SERVICES (OUTPATIENT)
Dept: PRIMARY CARE CLINIC | Age: 88
End: 2023-06-29

## 2023-06-29 DIAGNOSIS — R62.7 FAILURE TO THRIVE IN ADULT: Primary | ICD-10-CM
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
## 2023-07-04 ENCOUNTER — OUTSIDE SERVICES (OUTPATIENT)
Dept: PRIMARY CARE CLINIC | Age: 88
End: 2023-07-04
Payer: COMMERCIAL

## 2023-07-04 DIAGNOSIS — K21.9 GASTROESOPHAGEAL REFLUX DISEASE, UNSPECIFIED WHETHER ESOPHAGITIS PRESENT: Primary | ICD-10-CM

## 2023-07-04 PROCEDURE — 99309 SBSQ NF CARE MODERATE MDM 30: CPT | Performed by: STUDENT IN AN ORGANIZED HEALTH CARE EDUCATION/TRAINING PROGRAM

## 2023-07-04 RX ORDER — PANTOPRAZOLE SODIUM 40 MG/1
40 TABLET, DELAYED RELEASE ORAL
Qty: 30 TABLET | Refills: 3 | Status: SHIPPED | OUTPATIENT
Start: 2023-07-04

## 2023-07-04 NOTE — PROGRESS NOTES
[facility] of [address] [facility] Progress Note       Patient:  Stevo Burgess 80 y.o. male     Date of Service: 23  : 1934    Patient states he is doing ok today however is having some upper abdominal pain. Denies any fever, chills, chest pain, or SOB. Past Medical History:      Diagnosis Date    Dementia ([address])     Gout     Hypotension     Hypothyroidism        PastSurgical History:        Procedure Laterality Date    FEMUR FRACTURE SURGERY Left 3/16/2022    LEFT FEMUR PERIPROSTHETIC FRACTURE OPEN REDUCTION INTERNAL FIXATION performed by Kenrick Cintron DO at [facility] [place]       Allergies:    Patient has no known allergies. Review of Systems:   Review of Systems - as above     Physical Exam   Vitals: Stable   Physical Exam  Constitutional:       Appearance: He is well-developed. HENT:      Head: Normocephalic and atraumatic. Eyes:      General:         Right eye: No discharge. Left eye: No discharge. Conjunctiva/sclera: Conjunctivae normal.   Neck:      Trachea: No tracheal deviation. Cardiovascular:      Rate and Rhythm: Normal rate and regular rhythm. Heart sounds: Murmur heard. Pulmonary:      Effort: Pulmonary effort is normal. No respiratory distress. Breath sounds: No wheezing. Comments: Diminished on left  Abdominal:      General: Bowel sounds are normal. There is no distension. Tenderness: There is abdominal tenderness (epigastric). Musculoskeletal:      Cervical back: Normal range of motion and neck supple. Skin:     General: Skin is warm and dry. Neurological:      Mental Status: He is alert. Psychiatric:      Comments: More lucid today           Assessment and Plan       1. Gastroesophageal reflux disease, unspecified whether esophagitis present  New issue  -Symptoms and PE findings suggestive of gerd, thus will start PPI as below to see if this helps   - pantoprazole (PROTONIX) 40 MG tablet;  Take 1 tablet

## 2023-07-12 ENCOUNTER — OUTSIDE SERVICES (OUTPATIENT)
Dept: PRIMARY CARE CLINIC | Age: 88
End: 2023-07-12
Payer: COMMERCIAL

## 2023-07-12 DIAGNOSIS — K21.9 GASTROESOPHAGEAL REFLUX DISEASE, UNSPECIFIED WHETHER ESOPHAGITIS PRESENT: Primary | ICD-10-CM

## 2023-07-12 PROCEDURE — 99308 SBSQ NF CARE LOW MDM 20: CPT | Performed by: STUDENT IN AN ORGANIZED HEALTH CARE EDUCATION/TRAINING PROGRAM

## 2023-07-12 NOTE — PROGRESS NOTES
[facility] of [address] [facility] Progress Note       Patient:  Sydnee Birmingham 80 y.o. male     Date of Service: 23  : 1934    Patient states he is doing overall ok, having some baseline SOB though. Denies any fever, chills, chest pain, or SOB. Past Medical History:      Diagnosis Date    Dementia ([address])     Gout     Hypotension     Hypothyroidism        PastSurgical History:        Procedure Laterality Date    FEMUR FRACTURE SURGERY Left 3/16/2022    LEFT FEMUR PERIPROSTHETIC FRACTURE OPEN REDUCTION INTERNAL FIXATION performed by Lydia Carrillo DO at [facility] OR       Allergies:    Patient has no known allergies. Review of Systems:   Review of Systems - as above     Physical Exam   Vitals: Stable   Physical Exam  Constitutional:       Appearance: He is well-developed. HENT:      Head: Normocephalic and atraumatic. Eyes:      General:         Right eye: No discharge. Left eye: No discharge. Conjunctiva/sclera: Conjunctivae normal.   Neck:      Trachea: No tracheal deviation. Cardiovascular:      Rate and Rhythm: Normal rate and regular rhythm. Heart sounds: Murmur heard. Pulmonary:      Effort: Pulmonary effort is normal. No respiratory distress. Breath sounds: No wheezing. Comments: Diminished on left  Abdominal:      General: Bowel sounds are normal. There is no distension. Tenderness: There is no abdominal tenderness. Musculoskeletal:      Cervical back: Normal range of motion and neck supple. Skin:     General: Skin is warm and dry. Neurological:      Mental Status: He is alert. Assessment and Plan       1.  Gastroesophageal reflux disease, unspecified whether esophagitis present  -No complaints of abdominal pain, continue same     Breathing is baseline, will continue to watch                   Medication List:    Please see updated med list in nursing home paper chart       Day Guerrero DO

## 2023-07-29 ENCOUNTER — OUTSIDE SERVICES (OUTPATIENT)
Dept: PRIMARY CARE CLINIC | Age: 88
End: 2023-07-29

## 2023-07-29 DIAGNOSIS — I95.9 HYPOTENSION, UNSPECIFIED HYPOTENSION TYPE: Primary | ICD-10-CM

## 2023-07-29 NOTE — PROGRESS NOTES
[facility] of [address] [facility] Progress Note       Patient:  Regina Arechiga 80 y.o. male     Date of Service: 23  : 1934    Patient states he is doing ok with no complaints today and states his pain is well controlled. Denies any fever, chills, chest pain, or SOB. Past Medical History:      Diagnosis Date    Dementia ([address])     Gout     Hypotension     Hypothyroidism        PastSurgical History:        Procedure Laterality Date    FEMUR FRACTURE SURGERY Left 3/16/2022    LEFT FEMUR PERIPROSTHETIC FRACTURE OPEN REDUCTION INTERNAL FIXATION performed by Wu Guerrero DO at [facility] OR       Allergies:    Patient has no known allergies. Review of Systems:   Review of Systems - as above     Physical Exam   Vitals: Stable   Physical Exam  Constitutional:       Appearance: He is well-developed. HENT:      Head: Normocephalic and atraumatic. Eyes:      General:         Right eye: No discharge. Left eye: No discharge. Conjunctiva/sclera: Conjunctivae normal.   Neck:      Trachea: No tracheal deviation. Cardiovascular:      Rate and Rhythm: Normal rate and regular rhythm. Heart sounds: Murmur heard. Pulmonary:      Effort: Pulmonary effort is normal. No respiratory distress. Breath sounds: No wheezing. Abdominal:      General: Bowel sounds are normal. There is no distension. Tenderness: There is no abdominal tenderness. Musculoskeletal:      Cervical back: Normal range of motion and neck supple. Skin:     General: Skin is warm and dry. Neurological:      Mental Status: He is alert. Assessment and Plan       1.  Hypotension, unspecified hypotension type  -BP has been ok as of late, will continue to monitor                       Medication List:    Please see updated med list in nursing home paper chart       Karime Guzman DO       This document may have been prepared at least partially through the use of voice

## 2023-08-05 ENCOUNTER — OUTSIDE SERVICES (OUTPATIENT)
Dept: PRIMARY CARE CLINIC | Age: 88
End: 2023-08-05

## 2023-08-05 DIAGNOSIS — I95.9 HYPOTENSION, UNSPECIFIED HYPOTENSION TYPE: Primary | ICD-10-CM

## 2023-08-05 NOTE — PROGRESS NOTES
Although effort is taken to assure the accuracy ofthis document, it is possible that grammatical, syntax,  or spelling errors may occur.

## 2023-08-14 ENCOUNTER — OUTSIDE SERVICES (OUTPATIENT)
Dept: FAMILY MEDICINE CLINIC | Age: 88
End: 2023-08-14

## 2023-08-14 DIAGNOSIS — E03.9 HYPOTHYROIDISM, UNSPECIFIED TYPE: Primary | ICD-10-CM
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
## 2023-08-14 DIAGNOSIS — D50.9 IRON DEFICIENCY ANEMIA, UNSPECIFIED IRON DEFICIENCY ANEMIA TYPE: ICD-10-CM

## 2023-08-14 DIAGNOSIS — E55.9 VITAMIN D DEFICIENCY: ICD-10-CM

## 2023-08-14 DIAGNOSIS — I10 HYPERTENSION, UNSPECIFIED TYPE: ICD-10-CM

## 2023-08-14 NOTE — PROGRESS NOTES
8/14/2023    Dean Rico  5/23/1934    This resident is being seen today for a follow-up evaluation visit. He is a resident who has long-term medical conditions including hypertension, hypothyroidism, dementia with some degree of cognitive decline, frequent falls vitamin D deficiency, gout, respiratory failure with hypoxia in times past, rib fractures to the right side, anxiety disorder, major depressive disorder, moderate protein calorie malnutrition, diverticulitis, low back pain,. He is a 80 y.o. male resident who is being seen today for a follow-up visit with which this resident now resides in the long-term care setting. He had previously resided in assisted living but due to his decline he was transferred and now is currently under hospice services. There is concern however with respect to his most recent TSH which was very elevated at 106.77. He is responsive to verbal and tactile stimuli and indicates that he is just \"bothered every 30 minutes. \"  He offers no complaints per se and denies any pain, headaches or dizziness, sore throat, chest pain, coughing or shortness of breath, nausea or vomiting, constipation or diarrhea, fever or chills, or syncopal events. It is of note to mention that he has been found wandering in and out of other residents rooms and beds and needs constant redirection. He did also have a recent fall dated 7/30/2023 when he was apparently found lying on his back on the floor in front of his bathroom door and there were no apparent injuries noted. He did have nonskid slipper socks on his feet with a mat to the ground at his bedside and stated that he was apparently attempting to self transfer from his bed to his wheelchair and lost his balance.               Medications:  Memantine 7 mg at bedtime  Bimatoprost ophthalmic solution 0.1% 1 drop both eyes at bedtime  Cyclobenzaprine 10 mg twice daily  Aspirin 81 mg daily  Lasix 20 mg daily  Potassium 10 mill equivalents

## 2023-08-26 ENCOUNTER — OUTSIDE SERVICES (OUTPATIENT)
Dept: PRIMARY CARE CLINIC | Age: 88
End: 2023-08-26

## 2023-08-26 DIAGNOSIS — E03.9 HYPOTHYROIDISM, UNSPECIFIED TYPE: ICD-10-CM

## 2023-08-26 DIAGNOSIS — I95.9 HYPOTENSION, UNSPECIFIED HYPOTENSION TYPE: Primary | ICD-10-CM

## 2023-08-26 NOTE — PROGRESS NOTES
This document may have been prepared at least partially through the use of voice recognition software. Although effort is taken to assure the accuracy ofthis document, it is possible that grammatical, syntax,  or spelling errors may occur.

## 2023-08-30 ENCOUNTER — OUTSIDE SERVICES (OUTPATIENT)
Dept: PRIMARY CARE CLINIC | Age: 88
End: 2023-08-30
Payer: COMMERCIAL

## 2023-08-30 DIAGNOSIS — E03.9 HYPOTHYROIDISM, UNSPECIFIED TYPE: Primary | ICD-10-CM

## 2023-08-30 PROCEDURE — 99308 SBSQ NF CARE LOW MDM 20: CPT | Performed by: STUDENT IN AN ORGANIZED HEALTH CARE EDUCATION/TRAINING PROGRAM

## 2023-08-30 NOTE — PROGRESS NOTES
[facility] of [address] [facility] Progress Note       Patient:  Louis Reese 80 y.o. male     Date of Service: 23  : 1934    Patient states he is feeling fine. Denies any fever, chills, chest pain, or SOB. Past Medical History:      Diagnosis Date    Dementia ([address])     Gout     Hypotension     Hypothyroidism        PastSurgical History:        Procedure Laterality Date    FEMUR FRACTURE SURGERY Left 3/16/2022    LEFT FEMUR PERIPROSTHETIC FRACTURE OPEN REDUCTION INTERNAL FIXATION performed by Tonya He DO at [facility] OR       Allergies:    Patient has no known allergies. Review of Systems:   Review of Systems - as above     Physical Exam   Vitals: Stable   Physical Exam  Constitutional:       Appearance: He is well-developed. HENT:      Head: Normocephalic and atraumatic. Eyes:      General:         Right eye: No discharge. Left eye: No discharge. Conjunctiva/sclera: Conjunctivae normal.   Neck:      Trachea: No tracheal deviation. Cardiovascular:      Rate and Rhythm: Normal rate and regular rhythm. Heart sounds: Murmur heard. Pulmonary:      Effort: Pulmonary effort is normal. No respiratory distress. Breath sounds: No wheezing. Abdominal:      General: Bowel sounds are normal. There is no distension. Tenderness: There is no abdominal tenderness. Musculoskeletal:      Cervical back: Normal range of motion and neck supple. Skin:     General: Skin is warm and dry. Neurological:      Mental Status: He is alert. Assessment and Plan         1.  Hypothyroidism, unspecified type  -Spoke with nurse to ensure he will have TSH drawn this week    Otherwise, patient has no complaints and is doing ok, blood pressure looks ok                                  Medication List:    Please see updated med list in nursing home paper chart       Addison Ruiz DO       This document may have been prepared at least

## 2023-09-12 ENCOUNTER — OUTSIDE SERVICES (OUTPATIENT)
Dept: FAMILY MEDICINE CLINIC | Age: 88
End: 2023-09-12

## 2023-09-12 DIAGNOSIS — E03.9 HYPOTHYROIDISM, UNSPECIFIED TYPE: Primary | ICD-10-CM

## 2023-09-12 DIAGNOSIS — K59.00 CONSTIPATION, UNSPECIFIED CONSTIPATION TYPE: ICD-10-CM

## 2023-09-12 DIAGNOSIS — D50.9 IRON DEFICIENCY ANEMIA, UNSPECIFIED IRON DEFICIENCY ANEMIA TYPE: ICD-10-CM

## 2023-09-12 DIAGNOSIS — G47.00 INSOMNIA, UNSPECIFIED TYPE: ICD-10-CM

## 2023-09-12 DIAGNOSIS — M81.0 OSTEOPOROSIS, UNSPECIFIED OSTEOPOROSIS TYPE, UNSPECIFIED PATHOLOGICAL FRACTURE PRESENCE: ICD-10-CM

## 2023-09-12 NOTE — PROGRESS NOTES
9/12/2023    Jessica Conway  5/23/1934    This resident is being seen today for a follow-up evaluation visit. He is a resident who has long-term medical conditions including hypertension, hypothyroidism, dementia with some degree of cognitive decline, frequent falls vitamin D deficiency, gout, respiratory failure with hypoxia in times past, rib fractures to the right side, anxiety disorder, major depressive disorder, moderate protein calorie malnutrition, diverticulitis, low back pain,. He is a 80 y.o. male resident who is being seen today for a follow-up visit with this resident seen in conjunction with hospice services. He was also seen by psychiatric services and recommendations were given for low-dose melatonin as Staff indicates that he has been up for the past 3 nights. He was noted to be increasingly confused from his baseline status. He denies complaints at this point with respect any pain, headaches or dizziness, sore throat, chest pain, coughing or shortness of breath, nausea or vomiting, constipation or diarrhea, fever or chills, falls or syncopal events. Medications:  Toprol-XL 25 mg daily  Memantine 7 mg at bedtime  Bimatoprost ophthalmic solution 0.01% both eyes at bedtime  Cyclobenzaprine 10 mg twice daily  Aspirin 81 mg daily  Lasix 20 mg daily  Potassium 10 mill equivalents daily  Acetaminophen 1300 mg daily  Cymbalta 60 mg at bedtime  Remeron 15 mg at bedtime  Hydroxyzine 25 mg every 4 hours as needed  Flomax 0.4 mg daily  Dorzolamide ophthalmic 2% both eyes daily  Levothyroxine 175 mcg daily  Lumigan ophthalmic 0.01% at bedtime  Morphine sulfate 0.25 mL every 4 hours as needed  Melatonin 6 mg at bedtime                Objective     Vital Signs: /68 pulse 72 respirations 18 temperature 98.1 O2 95% weight 119 pounds      Physical examination:Skin is essentially warm and dry. HEENT unremarkable. Neck is supple. Heart regular rate and rhythm. No rubs, gallops or murmurs noted.

## 2023-09-20 ENCOUNTER — OUTSIDE SERVICES (OUTPATIENT)
Dept: PRIMARY CARE CLINIC | Age: 88
End: 2023-09-20
Payer: COMMERCIAL

## 2023-09-20 DIAGNOSIS — R41.0 CONFUSION: ICD-10-CM

## 2023-09-20 DIAGNOSIS — E03.9 HYPOTHYROIDISM, UNSPECIFIED TYPE: Primary | ICD-10-CM

## 2023-09-20 PROCEDURE — 99308 SBSQ NF CARE LOW MDM 20: CPT | Performed by: STUDENT IN AN ORGANIZED HEALTH CARE EDUCATION/TRAINING PROGRAM

## 2023-09-20 NOTE — PROGRESS NOTES
[facility] of [address] [facility] Progress Note       Patient:  Vishal Ramirez 80 y.o. male     Date of Service: 23  : 1934    Patient states he has been on a ship and has been having constipation with black stools. Denies any fever, chills, chest pain, or SOB. Per nursing, patient has been doing ok, just having confusion which seems his baseline now. Past Medical History:      Diagnosis Date    Dementia ([address])     Gout     Hypotension     Hypothyroidism        PastSurgical History:        Procedure Laterality Date    FEMUR FRACTURE SURGERY Left 3/16/2022    LEFT FEMUR PERIPROSTHETIC FRACTURE OPEN REDUCTION INTERNAL FIXATION performed by Rolando Bowman DO at [facility] OR       Allergies:    Patient has no known allergies. Review of Systems:   Review of Systems - as above     Physical Exam   Vitals: Stable   Physical Exam  Constitutional:       Appearance: He is well-developed. HENT:      Head: Normocephalic and atraumatic. Eyes:      General:         Right eye: No discharge. Left eye: No discharge. Conjunctiva/sclera: Conjunctivae normal.   Neck:      Trachea: No tracheal deviation. Cardiovascular:      Rate and Rhythm: Normal rate and regular rhythm. Heart sounds: Murmur heard. Pulmonary:      Effort: Pulmonary effort is normal. No respiratory distress. Breath sounds: No wheezing. Abdominal:      General: Bowel sounds are normal. There is no distension. Tenderness: There is no abdominal tenderness. Musculoskeletal:      Cervical back: Normal range of motion and neck supple. Skin:     General: Skin is warm and dry. Neurological:      Mental Status: He is alert. Assessment and Plan         1. Hypothyroidism, unspecified type  -F/u, tsh now WNL, continue same    2.  Confusion  -F/u, still confused, following with hospice and psych, will monitor for now                             Medication List:

## 2023-10-02 ENCOUNTER — OUTSIDE SERVICES (OUTPATIENT)
Dept: FAMILY MEDICINE CLINIC | Age: 88
End: 2023-10-02

## 2023-10-02 DIAGNOSIS — E55.9 VITAMIN D DEFICIENCY: ICD-10-CM

## 2023-10-02 DIAGNOSIS — M25.511 ACUTE PAIN OF RIGHT SHOULDER: Primary | ICD-10-CM

## 2023-10-02 DIAGNOSIS — G89.29 CHRONIC MIDLINE LOW BACK PAIN WITHOUT SCIATICA: ICD-10-CM

## 2023-10-02 DIAGNOSIS — M54.50 CHRONIC MIDLINE LOW BACK PAIN WITHOUT SCIATICA: ICD-10-CM

## 2023-10-02 DIAGNOSIS — D50.9 IRON DEFICIENCY ANEMIA, UNSPECIFIED IRON DEFICIENCY ANEMIA TYPE: ICD-10-CM

## 2023-10-02 DIAGNOSIS — I10 HYPERTENSION, UNSPECIFIED TYPE: ICD-10-CM

## 2023-10-02 NOTE — PROGRESS NOTES
10/2/2023    Romannick Schaefer  5/23/1934    This resident is being seen today for a follow-up evaluation visit. He is a resident who has long-term medical conditions including hypertension, hypothyroidism, dementia with some degree of cognitive decline, frequent falls vitamin D deficiency, gout, respiratory failure with hypoxia in times past, rib fractures to the right side, anxiety disorder, major depressive disorder, moderate protein calorie malnutrition, diverticulitis, low back pain,. He is a 80 y.o. male resident who is being seen today for a follow-up visit with staff indicating that this gentleman has been complaining of right shoulder pain and that he has been having the benefit of morphine if needed as per the recommendations of hospice. He denies complaints at this time and states that his shoulder has no pain. He has no headaches or dizziness, sore throat, chest pain, nausea or vomiting, constipation or diarrhea, fever or chills, syncopal events or seizure activity. Medications:  Toprol-XL 25 mg daily  Memantine 7 mg at bedtime  Bimatoprost ophthalmic solution 0.01% both eyes at bedtime  Cyclobenzaprine 10 mg twice daily  Aspirin 81 mg daily  Lasix 20 mg daily  Potassium 10 mill equivalents daily  Acetaminophen 1300 mg daily  Cymbalta 60 mg at bedtime  Remeron 15 mg at bedtime  Hydroxyzine 25 mg every 4 hours as needed  Flomax 0.4 mg daily  Dorzolamide ophthalmic 2% both eyes daily  Levothyroxine 175 mcg daily  Lumigan ophthalmic 0.01% at bedtime  Morphine sulfate 0.25 mL every 4 hours as needed  Melatonin 6 mg at bedtime                Objective     Vital Signs: /86 pulse 68 respirations 18 temperature 98.1 O2 97% weight 120 pounds      Physical examination:Skin is essentially warm and dry. HEENT unremarkable. Neck is supple. Heart regular rate and rhythm. No rubs, gallops or murmurs noted. Lungs are diminished and essentially clear to auscultation without evidence of wheezing noted.

## 2023-10-11 ENCOUNTER — OUTSIDE SERVICES (OUTPATIENT)
Dept: PRIMARY CARE CLINIC | Age: 88
End: 2023-10-11
Payer: COMMERCIAL

## 2023-10-11 DIAGNOSIS — M25.511 ACUTE PAIN OF RIGHT SHOULDER: Primary | ICD-10-CM

## 2023-10-11 PROCEDURE — 99308 SBSQ NF CARE LOW MDM 20: CPT | Performed by: STUDENT IN AN ORGANIZED HEALTH CARE EDUCATION/TRAINING PROGRAM

## 2023-10-11 NOTE — PROGRESS NOTES
[facility] [address] [facility] Progress Note       Patient:  Lea Chino 80 y.o. male     Date of Service: 10/11/23  : 1934    Patient states he is doing ok and pain is controlled. Denies any fever, chills, chest pain, or SOB. No concerns per nursing               Past Medical History:      Diagnosis Date    Dementia ([address])     Gout     Hypotension     Hypothyroidism        PastSurgical History:        Procedure Laterality Date    FEMUR FRACTURE SURGERY Left 3/16/2022    LEFT FEMUR PERIPROSTHETIC FRACTURE OPEN REDUCTION INTERNAL FIXATION performed by Cecilia Soria DO at [facility] OR       Allergies:    Patient has no known allergies. Review of Systems:   Review of Systems - as above     Physical Exam   Vitals: Stable   Physical Exam  Constitutional:       Appearance: He is well-developed. HENT:      Head: Normocephalic and atraumatic. Eyes:      General:         Right eye: No discharge. Left eye: No discharge. Conjunctiva/sclera: Conjunctivae normal.   Neck:      Trachea: No tracheal deviation. Cardiovascular:      Rate and Rhythm: Normal rate and regular rhythm. Heart sounds: Murmur heard. Pulmonary:      Effort: Pulmonary effort is normal. No respiratory distress. Breath sounds: No wheezing. Abdominal:      General: Bowel sounds are normal. There is no distension. Tenderness: There is no abdominal tenderness. Skin:     General: Skin is warm and dry. Neurological:      Mental Status: He is alert. Assessment and Plan         1. Acute pain of right shoulder  -F/u, pain controlled, will continue to watch                     Medication List:    Please see updated med list in nursing home paper chart or [address], DO       This document may have been prepared at least partially through the use of voice recognition software.  Although effort is taken to assure the accuracy ofthis document, it is

## 2023-10-21 ENCOUNTER — OUTSIDE SERVICES (OUTPATIENT)
Dept: PRIMARY CARE CLINIC | Age: 88
End: 2023-10-21

## 2023-10-21 DIAGNOSIS — J04.0 LARYNGITIS: Primary | ICD-10-CM

## 2023-10-21 RX ORDER — PREDNISONE 10 MG/1
TABLET ORAL
Qty: 30 TABLET | Refills: 0 | Status: SHIPPED | OUTPATIENT
Start: 2023-10-21 | End: 2023-10-31

## 2023-10-21 NOTE — PROGRESS NOTES
[facility] of [address] [facility] Progress Note       Patient:  Lea Chino 80 y.o. male     Date of Service: 10/21/23  : 1934    Patient states he has lost his voice, which started last night. He denies any cough. Past Medical History:      Diagnosis Date    Dementia ([address])     Gout     Hypotension     Hypothyroidism        PastSurgical History:        Procedure Laterality Date    FEMUR FRACTURE SURGERY Left 3/16/2022    LEFT FEMUR PERIPROSTHETIC FRACTURE OPEN REDUCTION INTERNAL FIXATION performed by Cecilia Soria DO at [facility] [place]       Allergies:    Patient has no known allergies. Review of Systems:   Review of Systems - as above     Physical Exam   Vitals: Stable   Physical Exam  Constitutional:       Appearance: He is well-developed. HENT:      Head: Normocephalic and atraumatic. Eyes:      General:         Right eye: No discharge. Left eye: No discharge. Conjunctiva/sclera: Conjunctivae normal.   Neck:      Trachea: No tracheal deviation. Cardiovascular:      Rate and Rhythm: Normal rate and regular rhythm. Heart sounds: Murmur heard. Pulmonary:      Effort: Pulmonary effort is normal. No respiratory distress. Breath sounds: No wheezing. Abdominal:      General: Bowel sounds are normal. There is no distension. Tenderness: There is no abdominal tenderness. Skin:     General: Skin is warm and dry. Neurological:      Mental Status: He is alert. Assessment and Plan       1. Laryngitis  New issue  -Unclear etiology, however most likely viral, will start prednisone as this may help with his inflammation and prevent worsening of his symptoms  - predniSONE (DELTASONE) 10 MG tablet; 5 tablets by mouth once a day x 2 days, then 4 tablets by mouth once a day x 2 days, then 3 tablets by mouth once a day x 2 days, then 2 tablets by mouth once a day x 2 days, then 1 tablets by mouth once a day x 2 days then stop.

## 2023-11-01 ENCOUNTER — OUTSIDE SERVICES (OUTPATIENT)
Dept: FAMILY MEDICINE CLINIC | Age: 88
End: 2023-11-01
Payer: COMMERCIAL

## 2023-11-01 DIAGNOSIS — R45.89 DEPRESSED MOOD: Primary | ICD-10-CM

## 2023-11-01 PROCEDURE — 99308 SBSQ NF CARE LOW MDM 20: CPT | Performed by: STUDENT IN AN ORGANIZED HEALTH CARE EDUCATION/TRAINING PROGRAM

## 2023-11-01 NOTE — PROGRESS NOTES
[facility] of [address] [facility] Progress Note       Patient:  Farida Vargas 80 y.o. male     Date of Service: 23  : 1934    Patient states he is doing ok and has no specific complaints today. States his pain is well controlled. Denies any fever or chills. No concerns per nursing. Past Medical History:      Diagnosis Date    Dementia ([address])     Gout     Hypotension     Hypothyroidism        PastSurgical History:        Procedure Laterality Date    FEMUR FRACTURE SURGERY Left 3/16/2022    LEFT FEMUR PERIPROSTHETIC FRACTURE OPEN REDUCTION INTERNAL FIXATION performed by Joan Callahan DO at [facility]       Allergies:    Patient has no known allergies. Review of Systems:   Review of Systems - as above     Physical Exam   Vitals: Stable   Physical Exam  Constitutional:       Appearance: He is well-developed. HENT:      Head: Normocephalic and atraumatic. Eyes:      General:         Right eye: No discharge. Left eye: No discharge. Conjunctiva/sclera: Conjunctivae normal.   Neck:      Trachea: No tracheal deviation. Cardiovascular:      Rate and Rhythm: Normal rate and regular rhythm. Heart sounds: Murmur heard. Pulmonary:      Effort: Pulmonary effort is normal. No respiratory distress. Breath sounds: No wheezing. Abdominal:      General: Bowel sounds are normal. There is no distension. Tenderness: There is no abdominal tenderness. Skin:     General: Skin is warm and dry. Neurological:      Mental Status: He is alert. Psychiatric:      Comments: Depressed mood               Assessment and Plan       1.  Depressed mood  -Seems like new issue, could be related to his dementia, will watch for now, if continues would consider SSRI              Medication List:    Please see updated med list in nursing home paper chart or [address] [place] [address], DO       This document may have been prepared at least partially

## 2023-11-15 ENCOUNTER — OUTSIDE SERVICES (OUTPATIENT)
Dept: PRIMARY CARE CLINIC | Age: 88
End: 2023-11-15
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
## 2023-11-15 DIAGNOSIS — J04.0 LARYNGITIS: Primary | ICD-10-CM

## 2023-11-15 NOTE — PROGRESS NOTES
partially through the use of voice recognition software. Although effort is taken to assure the accuracy ofthis document, it is possible that grammatical, syntax,  or spelling errors may occur.

## 2023-11-22 ENCOUNTER — OUTSIDE SERVICES (OUTPATIENT)
Dept: PRIMARY CARE CLINIC | Age: 88
End: 2023-11-22

## 2023-11-22 DIAGNOSIS — I95.9 HYPOTENSION, UNSPECIFIED HYPOTENSION TYPE: Primary | ICD-10-CM

## 2023-12-06 ENCOUNTER — OUTSIDE SERVICES (OUTPATIENT)
Dept: PRIMARY CARE CLINIC | Age: 88
End: 2023-12-06
Payer: MEDICARE

## 2023-12-06 DIAGNOSIS — R29.6 RECURRENT FALLS: Primary | ICD-10-CM

## 2023-12-06 DIAGNOSIS — M54.6 ACUTE THORACIC BACK PAIN, UNSPECIFIED BACK PAIN LATERALITY: ICD-10-CM

## 2023-12-06 PROCEDURE — 99308 SBSQ NF CARE LOW MDM 20: CPT | Performed by: STUDENT IN AN ORGANIZED HEALTH CARE EDUCATION/TRAINING PROGRAM

## 2023-12-06 NOTE — PROGRESS NOTES
[facility] [address] [facility] Progress Note       Patient:  Janes Stark 80 y.o. male     Date of Service: 23  : 1934    Patient states that he did have a recent fall and hurt his back. Patient states that he has mild back pain however it is tolerable. Denies any fever or chills. Per nursing, patient did fall and hurt his back however his pain seems tolerable. They did comment that he is having worsening confusion. Past Medical History:      Diagnosis Date    Dementia ([address])     Gout     Hypotension     Hypothyroidism        PastSurgical History:        Procedure Laterality Date    FEMUR FRACTURE SURGERY Left 3/16/2022    LEFT FEMUR PERIPROSTHETIC FRACTURE OPEN REDUCTION INTERNAL FIXATION performed by Shirlene Pastor DO at [facility] OR       Allergies:    Patient has no known allergies. Review of Systems:   Review of Systems - as above     Physical Exam   Vitals: Stable   Physical Exam  Constitutional:       Appearance: He is well-developed. HENT:      Head: Normocephalic and atraumatic. Eyes:      General:         Right eye: No discharge. Left eye: No discharge. Conjunctiva/sclera: Conjunctivae normal.   Neck:      Trachea: No tracheal deviation. Cardiovascular:      Rate and Rhythm: Normal rate and regular rhythm. Heart sounds: Murmur heard. Pulmonary:      Effort: Pulmonary effort is normal. No respiratory distress. Breath sounds: No wheezing. Abdominal:      General: Bowel sounds are normal. There is no distension. Tenderness: There is no abdominal tenderness. Skin:     General: Skin is warm and dry. Neurological:      Mental Status: He is alert. Psychiatric:         Behavior: Behavior normal.      Comments:                Assessment and Plan       1. Recurrent falls  Chronic issue  - Is hospice, we will just watch for now    2.  Acute thoracic back pain, unspecified back pain laterality  New issue  -

## 2023-12-12 NOTE — PROGRESS NOTES
Chief Complaint   Patient presents with    Follow Up After Procedure     L femur periprosthetic ORIF 03/16/22. [place] [facility] [place] [facility] [place] OP:SURGEON: Dr. Shaquille Cho DO  DATE OF PROCEDURE: 3-16-22  PROCEDURE: LEFT FEMUR PERIPROSTHETIC FRACTURE OPEN REDUCTION INTERNAL FIXATION       Subjective:  Jamie Lawrence is approximately 3 months follow-up from the above surgery. He has been mostly NWB to the [facility] and was previously instructed to wear hinged knee brace, but presents without this today. Denies any significant pain at the knee or distal thigh. No new injuries or complaints. Denies paresthesias . Denies calf pain, CP, SOB, fever, chills, malaise. Review of Systems -  all pertinent positives and negatives in HPI. Objective:    General: Alert and oriented X 3, normocephalic atraumatic, external ears and eye normal, sclera clear, no acute distress, respirations easy and unlabored with no audible wheezes, skin warm and dry, speech and dress appropriate for noted age, affect euthymic. Extremity:  Left Lower Extremity  Skin clean dry and intact, without signs of infection  Incisions healed  Very mild TTP at the knee joint line and anterior distal thigh  Extensor mechanism intact  AROM L knee 5-105  No effusions or edema noted  Compartments supple throughout thigh and leg  Calf supple and nontender  Demonstrates active ankle plantar/dorsiflexion/great toe extension. States sensation intact to touch in sural/deep peroneal/superficial peroneal/saphenous/posterior tibial nerve distributions to foot/ankle. Palpable dorsalis pedis and posterior tibialis pulses, cap refill brisk in toes, foot warm/perfused.              XR:   EXAMINATION:   2 XRAY VIEWS OF THE LEFT FEMUR       6/16/2022 1:05 pm       COMPARISON:   2nd May 2022       HISTORY:   ORDERING SYSTEM PROVIDED HISTORY: Closed displaced comminuted fracture of   shaft of left femur with routine healing   TECHNOLOGIST PROVIDED HISTORY:   Reason Pt transported at this time via stretcher to room 726 with telemetry monitor on. Pacemaker incision site cdi, no s/s of bleeding or hematoma upon transfer. Report was called to authorGEN. Pt belongings with pt's wife.  Electronically signed by Rakesh Narayan RN on 12/12/2023 at 1:51 PM for exam:->femur fracture   What reading provider will be dictating this exam?->CRC       FINDINGS:   Anatomic alignment of left hip and left knee arthroplasties.  Intact femoral   fixation hardware with anatomic alignment of fracture fragments.  No change   in the appearance of the underlying fracture.  Unremarkable soft tissues.           Impression   Intact femoral fixation hardware with stable appearance of underlying   fracture and anatomically aligned arthroplasties at the left hip and left   knee.                 Assessment:   Diagnosis Orders   1. Other fracture of left femur, initial encounter for closed fracture (HCC)  XR FEMUR LEFT (MIN 2 VIEWS)       Plan:    Weight Bearing: Okay to start progressive weightbearing to the left lower extremity. Use assistive devices when needed. Must wear hinged knee brace, completely unlocked, while ambulating. Okay to advance weightbearing 25% per week if tolerable. Once full weightbearing, okay to wean out of knee brace. If having difficulty with quadricep strength while ambulating, okay to ambulate with knee brace locked in extension. Therapy: Recommend physical therapy for gait training, strengthening, range of motion. See above weightbearing recommendations. Pain control: per facility physician, ice, elevation, compression if needed      Follow up:     6 to 8 weeks    Future Appointments   Date Time Provider Araseli Parker   8/4/2022  1:30 PM Chandler Seip,  [address], [address]           Electronically signed by Renetta Espana PA-C on 6/17/2022 at 11:44 AM  Note: This report was completed using [employer] voiced recognition software. Every effort has been made to ensure accuracy; however, inadvertent computerized transcription errors may be present.

## 2024-01-11 ENCOUNTER — OUTSIDE SERVICES (OUTPATIENT)
Dept: FAMILY MEDICINE CLINIC | Age: 89
End: 2024-01-11
Payer: MEDICARE

## 2024-01-11 DIAGNOSIS — Z91.81 HISTORY OF FALL: ICD-10-CM

## 2024-01-11 DIAGNOSIS — K59.00 CONSTIPATION, UNSPECIFIED CONSTIPATION TYPE: ICD-10-CM

## 2024-01-11 DIAGNOSIS — M81.0 OSTEOPOROSIS, UNSPECIFIED OSTEOPOROSIS TYPE, UNSPECIFIED PATHOLOGICAL FRACTURE PRESENCE: Primary | ICD-10-CM

## 2024-01-11 DIAGNOSIS — E03.9 HYPOTHYROIDISM, UNSPECIFIED TYPE: ICD-10-CM

## 2024-01-11 DIAGNOSIS — G47.00 INSOMNIA, UNSPECIFIED TYPE: ICD-10-CM

## 2024-01-11 PROCEDURE — 99309 SBSQ NF CARE MODERATE MDM 30: CPT | Performed by: NURSE PRACTITIONER

## 2024-01-11 NOTE — PROGRESS NOTES
1/8/2024          Antonio Cantor  5/23/1934    This resident is being seen today for a follow-up evaluation visit.  He is a resident who has long-term medical conditions including hypertension, hypothyroidism, dementia with some degree of cognitive decline, frequent falls vitamin D deficiency, gout, respiratory failure with hypoxia in times past, rib fractures to the right side, anxiety disorder, major depressive disorder, moderate protein calorie malnutrition, diverticulitis, low back pain,.  He is a 89 y.o. male resident who is being seen today for a follow-up evaluation with which this resident is bed to chair confined with recommendations to transfer by way of assist x 1 with which he does remain unsteady and noncompliant with his transfer status.  He actually had a history of a fall in September with which x-rays were completed to his lumbar sacral spine with evidence of age-indeterminate compression fractures noted.  At this point he really offers no complaints regarding any pain, headaches or dizziness, sore throat, chest pain, coughing or shortness of breath, nausea or vomiting, constipation or diarrhea, fever or chills, syncopal events or seizure activity.          Medications:  Toprol-XL 25 mg daily  Memantine 7 mg at bedtime  Bimatoprost ophthalmic solution 0.01% both eyes at bedtime  Cyclobenzaprine 10 mg twice daily  Aspirin 81 mg daily  Lasix 20 mg daily  Potassium 10 mill equivalents daily  Acetaminophen 1300 mg daily  Cymbalta 60 mg at bedtime  Remeron 15 mg at bedtime  Hydroxyzine 25 mg every 4 hours as needed  Flomax 0.4 mg daily  Dorzolamide ophthalmic 2% both eyes daily  Levothyroxine 175 mcg daily  Lumigan ophthalmic 0.01% at bedtime  Morphine sulfate 0.25 mL every 4 hours as needed  Melatonin 6 mg at bedtime                Objective     Vital Signs: /80 pulse 73 respirations 16 temperature 97.6 O2 97% weight 119 pounds      Physical examination:Skin is essentially warm and dry.  HEENT 
Fall

## 2024-01-20 ENCOUNTER — OUTSIDE SERVICES (OUTPATIENT)
Dept: PRIMARY CARE CLINIC | Age: 89
End: 2024-01-20

## 2024-01-20 DIAGNOSIS — Z51.5 HOSPICE CARE PATIENT: ICD-10-CM

## 2024-01-20 DIAGNOSIS — R29.6 RECURRENT FALLS: Primary | ICD-10-CM

## 2024-02-05 ENCOUNTER — OUTSIDE SERVICES (OUTPATIENT)
Dept: FAMILY MEDICINE CLINIC | Age: 89
End: 2024-02-05

## 2024-02-05 DIAGNOSIS — I10 HYPERTENSION, UNSPECIFIED TYPE: ICD-10-CM

## 2024-02-05 DIAGNOSIS — I50.9 ACUTE ON CHRONIC CONGESTIVE HEART FAILURE, UNSPECIFIED HEART FAILURE TYPE (HCC): ICD-10-CM

## 2024-02-05 DIAGNOSIS — D50.9 IRON DEFICIENCY ANEMIA, UNSPECIFIED IRON DEFICIENCY ANEMIA TYPE: ICD-10-CM

## 2024-02-05 DIAGNOSIS — R52 GENERALIZED BODY ACHES: Primary | ICD-10-CM

## 2024-02-05 DIAGNOSIS — F03.90 DEMENTIA WITHOUT BEHAVIORAL DISTURBANCE (HCC): ICD-10-CM

## 2024-02-21 ENCOUNTER — OUTSIDE SERVICES (OUTPATIENT)
Dept: PRIMARY CARE CLINIC | Age: 89
End: 2024-02-21
Payer: MEDICARE

## 2024-02-21 DIAGNOSIS — F32.A DEPRESSION, UNSPECIFIED DEPRESSION TYPE: ICD-10-CM

## 2024-02-21 DIAGNOSIS — U07.1 COVID: Primary | ICD-10-CM

## 2024-02-21 PROCEDURE — 99309 SBSQ NF CARE MODERATE MDM 30: CPT | Performed by: STUDENT IN AN ORGANIZED HEALTH CARE EDUCATION/TRAINING PROGRAM

## 2024-02-21 NOTE — PROGRESS NOTES
[facility] Progress Note       Patient:  Antonio Cantor 89 y.o. male     Date of Service: 24  : 1934    Patient states that he recently tested positive for COVID.  He has no complaints and does not feel short of breath currently.      Past Medical History:      Diagnosis Date    Dementia (HCC)     Gout     Hypotension     Hypothyroidism        PastSurgical History:        Procedure Laterality Date    FEMUR FRACTURE SURGERY Left 3/16/2022    LEFT FEMUR PERIPROSTHETIC FRACTURE OPEN REDUCTION INTERNAL FIXATION performed by Richie Melvin DO at [facility] [place]       Allergies:    Patient has no known allergies.      Review of Systems:   Review of Systems - as above     Physical Exam   Vitals: Stable   Physical Exam  Constitutional:       Appearance: He is well-developed.   HENT:      Head: Normocephalic and atraumatic.   Eyes:      General:         Right eye: No discharge.         Left eye: No discharge.      Conjunctiva/sclera: Conjunctivae normal.   Neck:      Trachea: No tracheal deviation.   Cardiovascular:      Rate and Rhythm: Normal rate and regular rhythm.      Heart sounds: Murmur heard.   Pulmonary:      Effort: Pulmonary effort is normal. No respiratory distress.      Breath sounds: No wheezing.   Abdominal:      General: Bowel sounds are normal. There is no distension.      Tenderness: There is no abdominal tenderness.   Skin:     General: Skin is warm and dry.   Neurological:      Mental Status: He is alert.   Psychiatric:         Behavior: Behavior normal.      Comments:                Assessment and Plan       1. COVID  New issue  - Having minimal symptoms and no shortness of breath and vitals are stable which is reassuring thus we will continue his prednisone taper as it is benefiting him.  COVID protocol was also initiated.    2. Depression, unspecified depression type  Follow-up of chronic issue  - Seems well-controlled, will continue his

## 2024-02-28 ENCOUNTER — OUTSIDE SERVICES (OUTPATIENT)
Dept: PRIMARY CARE CLINIC | Age: 89
End: 2024-02-28
Payer: MEDICARE

## 2024-02-28 DIAGNOSIS — U07.1 COVID: Primary | ICD-10-CM

## 2024-02-28 PROCEDURE — 99308 SBSQ NF CARE LOW MDM 20: CPT | Performed by: STUDENT IN AN ORGANIZED HEALTH CARE EDUCATION/TRAINING PROGRAM

## 2024-02-28 NOTE — PROGRESS NOTES
[facility]  Nursing [place] Progress Note       Patient:  Antonio Cantor 89 y.o. male     Date of Service: 24  : 1934    Patient states he is doing okay and having no specific concerns.  He denies any chest pain or shortness of breath.      Past Medical History:      Diagnosis Date    Dementia (HCC)     Gout     Hypotension     Hypothyroidism        PastSurgical History:        Procedure Laterality Date    FEMUR FRACTURE SURGERY Left 3/16/2022    LEFT FEMUR PERIPROSTHETIC FRACTURE OPEN REDUCTION INTERNAL FIXATION performed by Richie Melvin DO at [facility] [place]       Allergies:    Patient has no known allergies.      Review of Systems:   Review of Systems - as above     Physical Exam   Vitals: Stable   Physical Exam  Constitutional:       Appearance: He is well-developed.   HENT:      Head: Normocephalic and atraumatic.   Eyes:      General:         Right eye: No discharge.         Left eye: No discharge.      Conjunctiva/sclera: Conjunctivae normal.   Neck:      Trachea: No tracheal deviation.   Cardiovascular:      Rate and Rhythm: Normal rate and regular rhythm.      Heart sounds: Murmur heard.   Pulmonary:      Effort: Pulmonary effort is normal. No respiratory distress.      Breath sounds: No wheezing.   Abdominal:      General: Bowel sounds are normal. There is no distension.      Tenderness: There is no abdominal tenderness.   Skin:     General: Skin is warm and dry.   Neurological:      Mental Status: He is alert.   Psychiatric:         Behavior: Behavior normal.      Comments:                Assessment and Plan       1. COVID  Follow-up  -Symptoms continue to improve no specific concerns today and vitals are stable which is reassuring we will continue to monitor.        Medication List:    Please see updated med list in nursing home paper chart or PCC      Germán Vasquez DO       This document may have been prepared at least partially through the use of voice

## 2024-03-04 ENCOUNTER — OUTSIDE SERVICES (OUTPATIENT)
Dept: FAMILY MEDICINE CLINIC | Age: 89
End: 2024-03-04
Payer: MEDICARE

## 2024-03-04 DIAGNOSIS — U07.1 COVID: Primary | ICD-10-CM

## 2024-03-04 DIAGNOSIS — E55.9 VITAMIN D DEFICIENCY: ICD-10-CM

## 2024-03-04 DIAGNOSIS — E03.9 HYPOTHYROIDISM, UNSPECIFIED TYPE: ICD-10-CM

## 2024-03-04 DIAGNOSIS — G47.00 INSOMNIA, UNSPECIFIED TYPE: ICD-10-CM

## 2024-03-04 DIAGNOSIS — F05 SUNDOWN SYNDROME: ICD-10-CM

## 2024-03-04 PROCEDURE — 99309 SBSQ NF CARE MODERATE MDM 30: CPT | Performed by: NURSE PRACTITIONER

## 2024-03-04 NOTE — PROGRESS NOTES
3/4/2024          Antonio Cantor  5/23/1934    This resident is being seen today for a follow-up evaluation visit.  He is a resident who has long-term medical conditions including hypertension, hypothyroidism, dementia with some degree of cognitive decline, frequent falls vitamin D deficiency, gout, respiratory failure with hypoxia in times past, rib fractures to the right side, anxiety disorder, major depressive disorder, moderate protein calorie malnutrition, diverticulitis, low back pain,.  He is a 89 y.o. male resident who is being seen today for a follow-up evaluation with which this resident was under recent assessment for apparent COVID which was diagnosed on February 16 and he was given the benefit of a prednisone taper at that time.  He has no complaints this morning but did indicate that he has no desire to eat his breakfast.  He has no headaches or dizziness, sore throat, chest pain, nausea or vomiting, constipation or diarrhea, fever or chills, falls or syncopal events.  He is currently seen in conjunction with [facility] services.            Medications:  Toprol-XL 25 mg daily  Memantine 7 mg at bedtime  Bimatoprost ophthalmic solution 0.01% both eyes at bedtime  Cyclobenzaprine 10 mg twice daily  Aspirin 81 mg daily  Lasix 20 mg daily  Potassium 10 mill equivalents daily  Acetaminophen 1300 mg daily  Cymbalta 60 mg at bedtime  Remeron 15 mg at bedtime  Hydroxyzine 25 mg every 4 hours as needed  Flomax 0.4 mg daily  Dorzolamide ophthalmic 2% both eyes daily  Levothyroxine 175 mcg daily  Lumigan ophthalmic 0.01% at bedtime  Morphine sulfate 0.25 mL every 4 hours as needed  Melatonin 6 mg at bedtime                Objective     Vital Signs: /68 pulse 80 respirations 18 temperature 97.8 O2 95% weight 127 pounds      Physical examination:Skin is essentially warm and dry.  HEENT unremarkable. Neck is supple. Heart regular rate and rhythm. No rubs, gallops or murmurs noted.  Lungs are diminished

## 2024-03-25 ENCOUNTER — OUTSIDE SERVICES (OUTPATIENT)
Dept: PRIMARY CARE CLINIC | Age: 89
End: 2024-03-25
Payer: MEDICARE

## 2024-03-25 DIAGNOSIS — I95.9 HYPOTENSION, UNSPECIFIED HYPOTENSION TYPE: Primary | ICD-10-CM

## 2024-03-25 DIAGNOSIS — E03.9 HYPOTHYROIDISM, UNSPECIFIED TYPE: ICD-10-CM

## 2024-03-25 DIAGNOSIS — Z51.5 HOSPICE CARE PATIENT: ICD-10-CM

## 2024-03-25 PROCEDURE — 99308 SBSQ NF CARE LOW MDM 20: CPT | Performed by: STUDENT IN AN ORGANIZED HEALTH CARE EDUCATION/TRAINING PROGRAM

## 2024-03-25 NOTE — PROGRESS NOTES
[facility]  Nursing [place] Progress Note       Patient:  Antonio Cantor 89 y.o. male     Date of Service: 3/25/24  : 1934    Patient has no concerns today. Denies any pain, fever, or chills.        Past Medical History:      Diagnosis Date    Dementia (HCC)     Gout     Hypotension     Hypothyroidism        PastSurgical History:        Procedure Laterality Date    FEMUR FRACTURE SURGERY Left 3/16/2022    LEFT FEMUR PERIPROSTHETIC FRACTURE OPEN REDUCTION INTERNAL FIXATION performed by Richie Melvin DO at [facility] [place]       Allergies:    Patient has no known allergies.      Review of Systems:   Review of Systems - as above     Physical Exam   Vitals: Stable   Physical Exam  Constitutional:       Appearance: He is well-developed.   HENT:      Head: Normocephalic and atraumatic.   Eyes:      General:         Right eye: No discharge.         Left eye: No discharge.      Conjunctiva/sclera: Conjunctivae normal.   Neck:      Trachea: No tracheal deviation.   Cardiovascular:      Rate and Rhythm: Normal rate and regular rhythm.      Heart sounds: Murmur heard.   Pulmonary:      Effort: Pulmonary effort is normal. No respiratory distress.      Breath sounds: No wheezing.   Abdominal:      General: Bowel sounds are normal. There is no distension.      Tenderness: There is no abdominal tenderness.   Skin:     General: Skin is warm and dry.   Neurological:      Mental Status: He is alert.   Psychiatric:         Behavior: Behavior normal.      Comments:                Assessment and Plan       1. Hypotension, unspecified hypotension type  F/u  -Vitals stable, will continue to watch    2. Hospice care patient  No pain, doing ok    3. Hypothyroidism, unspecified type  F/u of chronic issue  -TSH WNL, continue same medical regimen for this         Medication List:    Please see updated med list in nursing home paper chart or PCC      Germán Vasquez DO       This document may have been

## 2024-04-01 ENCOUNTER — OUTSIDE SERVICES (OUTPATIENT)
Dept: FAMILY MEDICINE CLINIC | Age: 89
End: 2024-04-01
Payer: MEDICARE

## 2024-04-01 DIAGNOSIS — F03.90 DEMENTIA WITHOUT BEHAVIORAL DISTURBANCE (HCC): Primary | ICD-10-CM

## 2024-04-01 DIAGNOSIS — I10 HYPERTENSION, UNSPECIFIED TYPE: ICD-10-CM

## 2024-04-01 DIAGNOSIS — K59.00 CONSTIPATION, UNSPECIFIED CONSTIPATION TYPE: ICD-10-CM

## 2024-04-01 DIAGNOSIS — I50.9 ACUTE ON CHRONIC CONGESTIVE HEART FAILURE, UNSPECIFIED HEART FAILURE TYPE (HCC): ICD-10-CM

## 2024-04-01 DIAGNOSIS — D50.9 IRON DEFICIENCY ANEMIA, UNSPECIFIED IRON DEFICIENCY ANEMIA TYPE: ICD-10-CM

## 2024-04-01 PROCEDURE — 99309 SBSQ NF CARE MODERATE MDM 30: CPT | Performed by: NURSE PRACTITIONER

## 2024-04-01 NOTE — PROGRESS NOTES
4/1/2024        Antonio Cantor  5/23/1934    This resident is being seen today for a follow-up evaluation visit.  He is a resident who has long-term medical conditions including hypertension, hypothyroidism, dementia with some degree of cognitive decline, frequent falls vitamin D deficiency, gout, respiratory failure with hypoxia in times past, rib fractures to the right side, anxiety disorder, major depressive disorder, moderate protein calorie malnutrition, diverticulitis, low back pain,.  He is a 89 y.o. male resident who is being seen today for a follow-up visit with which this resident is seen in conjunction with hospice services and states that he feels that he has been doing well.  He denies complaints regarding pain, headaches or dizziness, sore throat, chest pain, nausea or vomiting, constipation or diarrhea, fever or chills, syncopal events or seizure activity.            Medications:  Toprol-XL 25 mg daily  Memantine 7 mg at bedtime  Bimatoprost ophthalmic solution 0.01% both eyes at bedtime  Cyclobenzaprine 10 mg twice daily  Aspirin 81 mg daily  Lasix 20 mg daily  Potassium 10 mill equivalents daily  Acetaminophen 1300 mg daily  Cymbalta 60 mg at bedtime  Remeron 15 mg at bedtime  Hydroxyzine 25 mg every 4 hours as needed  Flomax 0.4 mg daily  Dorzolamide ophthalmic 2% both eyes daily  Levothyroxine 175 mcg daily  Lumigan ophthalmic 0.01% at bedtime  Morphine sulfate 0.25 mL every 4 hours as needed  Melatonin 6 mg at bedtime                Objective     Vital Signs: /65 pulse 86 respirations 17 temperature 98 O2 95% weight 125.8 pounds      Physical examination:Skin is essentially warm and dry.  HEENT unremarkable. Neck is supple. Heart regular rate and rhythm. No rubs, gallops or murmurs noted.  Lungs are diminished and essentially clear to auscultation without evidence of wheezing noted.  Abdomen is soft and supple and non-tender.  Bowel sounds are noted x4 quadrants. No rigidity, guarding or

## 2024-04-15 ENCOUNTER — OUTSIDE SERVICES (OUTPATIENT)
Dept: PRIMARY CARE CLINIC | Age: 89
End: 2024-04-15
Payer: MEDICARE

## 2024-04-15 DIAGNOSIS — R29.6 RECURRENT FALLS: ICD-10-CM

## 2024-04-15 DIAGNOSIS — Z51.5 HOSPICE CARE PATIENT: Primary | ICD-10-CM

## 2024-04-15 PROCEDURE — 99308 SBSQ NF CARE LOW MDM 20: CPT | Performed by: STUDENT IN AN ORGANIZED HEALTH CARE EDUCATION/TRAINING PROGRAM

## 2024-04-15 NOTE — PROGRESS NOTES
[facility]  Nursing [place] Progress Note       Patient:  Antonio Cantor 89 y.o. male     Date of Service: 4/15/24  : 1934    Patient has no concerns today and states his pain is under well control. Denies any pain, fever, or chills.        Past Medical History:      Diagnosis Date    Dementia (HCC)     Gout     Hypotension     Hypothyroidism        PastSurgical History:        Procedure Laterality Date    FEMUR FRACTURE SURGERY Left 3/16/2022    LEFT FEMUR PERIPROSTHETIC FRACTURE OPEN REDUCTION INTERNAL FIXATION performed by Richie Melvin DO at [facility] [place]       Allergies:    Patient has no known allergies.      Review of Systems:   Review of Systems - as above     Physical Exam   Vitals: Stable   Physical Exam  Constitutional:       Appearance: He is well-developed.   HENT:      Head: Normocephalic and atraumatic.   Eyes:      General:         Right eye: No discharge.         Left eye: No discharge.      Conjunctiva/sclera: Conjunctivae normal.   Neck:      Trachea: No tracheal deviation.   Cardiovascular:      Rate and Rhythm: Normal rate and regular rhythm.      Heart sounds: Murmur heard.   Pulmonary:      Effort: Pulmonary effort is normal. No respiratory distress.      Breath sounds: No wheezing.   Abdominal:      General: Bowel sounds are normal. There is no distension.      Tenderness: There is no abdominal tenderness.   Skin:     General: Skin is warm and dry.   Neurological:      Mental Status: He is alert.   Psychiatric:         Behavior: Behavior normal.      Comments:                Assessment and Plan       1. Hospice care patient  Follow-up  - Pain is under good control thus we will continue his same medical regimen for this and will continue to follow along with hospice care    2. Recurrent falls  As above            Medication List:    Please see updated med list in nursing home paper chart or PCC      Germán Vasquez,        This document may have

## 2024-04-22 ENCOUNTER — OUTSIDE SERVICES (OUTPATIENT)
Dept: FAMILY MEDICINE CLINIC | Age: 89
End: 2024-04-22
Payer: MEDICARE

## 2024-04-22 DIAGNOSIS — M25.552 LEFT HIP PAIN: Primary | ICD-10-CM

## 2024-04-22 DIAGNOSIS — G47.00 INSOMNIA, UNSPECIFIED TYPE: ICD-10-CM

## 2024-04-22 DIAGNOSIS — M81.0 OSTEOPOROSIS, UNSPECIFIED OSTEOPOROSIS TYPE, UNSPECIFIED PATHOLOGICAL FRACTURE PRESENCE: ICD-10-CM

## 2024-04-22 DIAGNOSIS — E55.9 VITAMIN D DEFICIENCY: ICD-10-CM

## 2024-04-22 DIAGNOSIS — E03.9 HYPOTHYROIDISM, UNSPECIFIED TYPE: ICD-10-CM

## 2024-04-22 PROCEDURE — 99309 SBSQ NF CARE MODERATE MDM 30: CPT | Performed by: NURSE PRACTITIONER

## 2024-04-22 NOTE — PROGRESS NOTES
4/22/2024        Antonio Cantor  5/23/1934    This resident is being seen today for an acute evaluation visit.  He is a resident who has long-term medical conditions including hypertension, hypothyroidism, dementia with some degree of cognitive decline, frequent falls vitamin D deficiency, gout, respiratory failure with hypoxia in times past, rib fractures to the right side, anxiety disorder, major depressive disorder, moderate protein calorie malnutrition, diverticulitis, low back pain,.  He is a 89 y.o. male resident who is being seen today for an acute evaluation secondary to complaints of extreme pain to his left hip.  And x-ray was obtained which showed evidence of osteoporosis with no acute findings but recommendations were given for follow-up CT scan for high suspicion of acute fracture.  He is currently seen in conjunction with hospice services and recommendations were given for him to have the benefit of morphine every 6 hours routinely.  It is of note to mention the family did state that they did not want a CAT scan completed to his hip.  On today's evaluation he indicates that his pain is controlled.  He is a gentleman who apparently was lowered to the floor on April 15 and was lying on his back with his legs straddling the toilet on the bathroom floor and was having no acute pain at that period of time.  He has no concerns with respect to any headaches or dizziness, sore throat, chest pain, coughing or shortness of breath, nausea or vomiting, constipation or diarrhea, fever or chills, syncopal event or seizure activity.  He did have recent recommendations given her hospice services for discontinuation of some medications including Flomax and Synthroid with downward titration of Remeron and ultimate discontinuation thereafter.          Medications:  Toprol-XL 25 mg daily  Memantine 7 mg at bedtime  Bimatoprost ophthalmic solution 0.01% both eyes at bedtime  Cyclobenzaprine 10 mg twice daily  Aspirin 81 mg

## 2024-05-06 ENCOUNTER — OUTSIDE SERVICES (OUTPATIENT)
Dept: FAMILY MEDICINE CLINIC | Age: 89
End: 2024-05-06
Payer: MEDICARE

## 2024-05-06 DIAGNOSIS — F03.90 DEMENTIA WITHOUT BEHAVIORAL DISTURBANCE (HCC): ICD-10-CM

## 2024-05-06 DIAGNOSIS — D50.9 IRON DEFICIENCY ANEMIA, UNSPECIFIED IRON DEFICIENCY ANEMIA TYPE: ICD-10-CM

## 2024-05-06 DIAGNOSIS — I10 HYPERTENSION, UNSPECIFIED TYPE: Primary | ICD-10-CM

## 2024-05-06 DIAGNOSIS — K59.00 CONSTIPATION, UNSPECIFIED CONSTIPATION TYPE: ICD-10-CM

## 2024-05-06 DIAGNOSIS — M25.552 LEFT HIP PAIN: ICD-10-CM

## 2024-05-06 PROCEDURE — 99309 SBSQ NF CARE MODERATE MDM 30: CPT | Performed by: NURSE PRACTITIONER

## 2024-05-06 NOTE — PROGRESS NOTES
5/6/2024        Antonioteresa Pollardens  5/23/1934    This resident is being seen today for a follow-up evaluation visit.  He is a resident who has long-term medical conditions including hypertension, hypothyroidism, dementia with some degree of cognitive decline, frequent falls vitamin D deficiency, gout, respiratory failure with hypoxia in times past, rib fractures to the right side, anxiety disorder, major depressive disorder, moderate protein calorie malnutrition, diverticulitis, low back pain,.  He is a 89 y.o. male resident who is being seen today for a follow-up evaluation with which this resident resides in the long-term care setting and staff indicates that he has been fairly stable and without any further complaints regarding his hip pain.  He did have recent imaging studies on April 18 with a follow-up CAT scan recommended due to the high suspicion of an acute fracture on imaging studies with which his family did decline due to his hospice recommendations and he does continue with morphine for pain management.  During my evaluation today he did show evidence of baseline confusion which is normal.  He offers no complaints regarding any pain, headaches or dizziness, sore throat, chest pain, nausea or vomiting, constipation or diarrhea, fever or chills, falls or syncopal events.        Medications:  Toprol-XL 25 mg daily  Memantine 7 mg at bedtime  Bimatoprost ophthalmic solution 0.01% both eyes at bedtime  Cyclobenzaprine 10 mg twice daily  Aspirin 81 mg daily  Lasix 20 mg daily  Potassium 10 mill equivalents daily  Acetaminophen 1300 mg daily  Cymbalta 60 mg at bedtime  Remeron 7.5 mg at bedtime  Hydroxyzine 25 mg every 4 hours as needed  Flomax 0.4 mg daily  Dorzolamide ophthalmic 2% both eyes daily  Levothyroxine 175 mcg daily  Lumigan ophthalmic 0.01% at bedtime  Morphine sulfate 0.25 mL every 4 hours as needed  Melatonin 6 mg at bedtime  Ativan 0.5 mg every 4 hours as needed  Morphine 0.25 mL every 6

## 2024-05-20 ENCOUNTER — OUTSIDE SERVICES (OUTPATIENT)
Dept: PRIMARY CARE CLINIC | Age: 89
End: 2024-05-20
Payer: MEDICARE

## 2024-05-20 DIAGNOSIS — Z51.5 HOSPICE CARE PATIENT: Primary | ICD-10-CM

## 2024-05-20 DIAGNOSIS — R29.6 RECURRENT FALLS: ICD-10-CM

## 2024-05-20 PROCEDURE — 99308 SBSQ NF CARE LOW MDM 20: CPT | Performed by: STUDENT IN AN ORGANIZED HEALTH CARE EDUCATION/TRAINING PROGRAM

## 2024-05-20 NOTE — PROGRESS NOTES
[facility] Progress Note       Patient:  Antonio Cantor 89 y.o. male     Date of Service: 24  : 1934    Patient states he is having no pain.  He denies any fever or chills.      Past Medical History:      Diagnosis Date    Dementia (HCC)     Gout     Hypotension     Hypothyroidism        PastSurgical History:        Procedure Laterality Date    FEMUR FRACTURE SURGERY Left 3/16/2022    LEFT FEMUR PERIPROSTHETIC FRACTURE OPEN REDUCTION INTERNAL FIXATION performed by Richie Melvin DO at [facility] [place]       Allergies:    Patient has no known allergies.      Review of Systems:   Review of Systems - as above     Physical Exam   Vitals: Stable   Physical Exam  Constitutional:       Appearance: He is well-developed.   HENT:      Head: Normocephalic and atraumatic.   Eyes:      General:         Right eye: No discharge.         Left eye: No discharge.      Conjunctiva/sclera: Conjunctivae normal.   Neck:      Trachea: No tracheal deviation.   Cardiovascular:      Rate and Rhythm: Normal rate and regular rhythm.      Heart sounds: Murmur heard.   Pulmonary:      Effort: Pulmonary effort is normal. No respiratory distress.      Breath sounds: No wheezing.   Abdominal:      General: Bowel sounds are normal. There is no distension.      Tenderness: There is no abdominal tenderness.   Skin:     General: Skin is warm and dry.   Neurological:      Mental Status: He is alert.   Psychiatric:         Behavior: Behavior normal.      Comments:                Assessment and Plan       1. Hospice care patient  Follow-up  - No concerns or complaints of pain, thus we will follow along closely with hospice and ensure patient is comfortable on his current medication regimen    2. Recurrent falls  Recurrent issue  - Concerns previously for hip fracture however no complaints of pain today thus we will just watch for now.  There were considerations for CT scan however family did not

## 2024-06-03 ENCOUNTER — OUTSIDE SERVICES (OUTPATIENT)
Dept: FAMILY MEDICINE CLINIC | Age: 89
End: 2024-06-03
Payer: MEDICARE

## 2024-06-03 DIAGNOSIS — E03.9 HYPOTHYROIDISM, UNSPECIFIED TYPE: ICD-10-CM

## 2024-06-03 DIAGNOSIS — E55.9 VITAMIN D DEFICIENCY: ICD-10-CM
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
## 2024-06-03 DIAGNOSIS — M81.0 OSTEOPOROSIS, UNSPECIFIED OSTEOPOROSIS TYPE, UNSPECIFIED PATHOLOGICAL FRACTURE PRESENCE: ICD-10-CM

## 2024-06-03 DIAGNOSIS — G47.00 INSOMNIA, UNSPECIFIED TYPE: ICD-10-CM

## 2024-06-03 PROCEDURE — 99309 SBSQ NF CARE MODERATE MDM 30: CPT | Performed by: NURSE PRACTITIONER

## 2024-06-03 NOTE — PROGRESS NOTES
6/3/2024        Antonio Cantor  5/23/1934    This resident is being seen today for a follow-up evaluation visit.  He is a resident who has long-term medical conditions including hypertension, hypothyroidism, dementia with some degree of cognitive decline, frequent falls vitamin D deficiency, gout, respiratory failure with hypoxia in times past, rib fractures to the right side, anxiety disorder, major depressive disorder, moderate protein calorie malnutrition, diverticulitis, low back pain,.  He is a 89 y.o. male resident who is being seen today for a follow-up visit with which this resident is currently seen in conjunction with hospice services and does have evidence of baseline confusion.  He did have a recent fall on May 11 where he was found to be sitting on his floor mat with his leg stretched in front of him and had no complaints of pain.  Currently denies complaints regarding any further pain, headaches or dizziness, sore throat, chest pain, nausea or vomiting, constipation or diarrhea, fever or chills, or syncopal events.          Medications:  Toprol-XL 25 mg daily  Memantine 7 mg at bedtime  Bimatoprost ophthalmic solution 0.01% both eyes at bedtime  Cyclobenzaprine 10 mg twice daily  Aspirin 81 mg daily  Lasix 20 mg daily  Potassium 10 mill equivalents daily  Acetaminophen 1300 mg daily  Cymbalta 60 mg at bedtime  Remeron 7.5 mg at bedtime  Hydroxyzine 25 mg every 4 hours as needed  Flomax 0.4 mg daily  Dorzolamide ophthalmic 2% both eyes daily  Levothyroxine 175 mcg daily  Lumigan ophthalmic 0.01% at bedtime  Morphine sulfate 0.25 mL every 4 hours as needed  Melatonin 6 mg at bedtime  Ativan 0.5 mg every 4 hours as needed  Morphine 0.25 mL every 6 hours                  Objective     Vital Signs: /48 pulse 78 respirations 16 temperature 97.4 O2 97% weight 118.9 pounds      Physical examination:Skin is essentially warm and dry.  HEENT unremarkable. Neck is supple. Heart regular rate and rhythm. No

## 2024-06-24 ENCOUNTER — OUTSIDE SERVICES (OUTPATIENT)
Dept: PRIMARY CARE CLINIC | Age: 89
End: 2024-06-24
Payer: MEDICARE

## 2024-06-24 DIAGNOSIS — Z51.5 HOSPICE CARE PATIENT: Primary | ICD-10-CM

## 2024-06-24 PROCEDURE — 99308 SBSQ NF CARE LOW MDM 20: CPT | Performed by: STUDENT IN AN ORGANIZED HEALTH CARE EDUCATION/TRAINING PROGRAM

## 2024-06-24 NOTE — PROGRESS NOTES
[facility]  Nursing [place] Progress Note       Patient:  Antonio Cantor 89 y.o. male     Date of Service: 24  : 1934    Patient states he is comfortable and has no specific concerns or pain today.  He denies any fever, chills, chest pain, or shortness of breath.      Past Medical History:      Diagnosis Date    Dementia (HCC)     Gout     Hypotension     Hypothyroidism        PastSurgical History:        Procedure Laterality Date    FEMUR FRACTURE SURGERY Left 3/16/2022    LEFT FEMUR PERIPROSTHETIC FRACTURE OPEN REDUCTION INTERNAL FIXATION [place] Mel[place] [facility] [place]       Allergies:    Patient has no known allergies.      Review of Systems:   Review of Systems - as above     Physical Exam   Vitals: Stable   Physical Exam  Constitutional:       Appearance: He is well-developed.   HENT:      Head: Normocephalic and atraumatic.   Eyes:      General:         Right eye: No discharge.         Left eye: No discharge.      Conjunctiva/sclera: Conjunctivae normal.   Neck:      Trachea: No tracheal deviation.   Cardiovascular:      Rate and Rhythm: Normal rate and regular rhythm.      Heart sounds: Murmur heard.   Pulmonary:      Effort: Pulmonary effort is normal. No respiratory distress.      Breath sounds: No wheezing.   Abdominal:      General: Bowel sounds are normal. There is no distension.      Tenderness: There is no abdominal tenderness.   Skin:     General: Skin is warm and dry.   Neurological:      Mental Status: He is alert.   Psychiatric:         Behavior: Behavior normal.      Comments:                Assessment and Plan       1. Hospice care patient  Follow-up  - Pain seems to be well-controlled thus we will continue to follow with hospice care and ensure he is comfortable.                Medication List:    Please see updated med list in nursing home paper chart or PCC      Germán Vasquez DO       This document may have been prepared at

## 2024-07-01 ENCOUNTER — OUTSIDE SERVICES (OUTPATIENT)
Dept: FAMILY MEDICINE CLINIC | Age: 89
End: 2024-07-01

## 2024-07-01 DIAGNOSIS — D50.9 IRON DEFICIENCY ANEMIA, UNSPECIFIED IRON DEFICIENCY ANEMIA TYPE: Primary | ICD-10-CM

## 2024-07-01 DIAGNOSIS — I50.9 ACUTE ON CHRONIC CONGESTIVE HEART FAILURE, UNSPECIFIED HEART FAILURE TYPE (HCC): ICD-10-CM

## 2024-07-01 DIAGNOSIS — F05 SUNDOWN SYNDROME: ICD-10-CM
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
## 2024-07-01 DIAGNOSIS — K59.00 CONSTIPATION, UNSPECIFIED CONSTIPATION TYPE: ICD-10-CM

## 2024-07-01 NOTE — PROGRESS NOTES
7/1/2024        Antonioteresa Pollardens  5/23/1934    This resident is being seen today for a follow-up evaluation visit.  He is a resident who has long-term medical conditions including hypertension, hypothyroidism, dementia with some degree of cognitive decline, frequent falls vitamin D deficiency, gout, respiratory failure with hypoxia in times past, rib fractures to the right side, anxiety disorder, major depressive disorder, moderate protein calorie malnutrition, diverticulitis, low back pain,.  He is a 89 y.o. male resident who is being seen today for a follow-up evaluation with this resident under assessment for a recent fall on 6/18/2024 where he was found to have a laceration to his forehead and a skin tear to his right upper extremity.  Staff does indicate that he has been declining rapidly and he does maintain the benefit of his hospice services.  He has no complaints at this time regarding any headaches or dizziness, sore throat, chest pain, nausea or vomiting, constipation or diarrhea, fever or chills, syncopal events or seizure activity.        Medications:  Toprol-XL 25 mg daily  Memantine 7 mg at bedtime  Bimatoprost ophthalmic solution 0.01% both eyes at bedtime  Cyclobenzaprine 10 mg twice daily  Aspirin 81 mg daily  Lasix 20 mg daily  Potassium 10 mill equivalents daily  Acetaminophen 1300 mg daily  Cymbalta 60 mg at bedtime  Remeron 7.5 mg at bedtime  Hydroxyzine 25 mg every 4 hours as needed  Flomax 0.4 mg daily  Dorzolamide ophthalmic 2% both eyes daily  Levothyroxine 175 mcg daily  Lumigan ophthalmic 0.01% at bedtime  Morphine sulfate 0.25 mL every 4 hours as needed  Melatonin 6 mg at bedtime  Ativan 0.5 mg every 4 hours as needed  Morphine 0.25 mL every 6 hours        Objective     Vital Signs: /61 pulse 74 respirations 16 temperature 97.4 O2 97% weight 121.1 pounds      Physical examination:Skin is essentially warm and dry.  He does have a notable area to his forehead from a previous laceration

## 2024-07-08 ENCOUNTER — OUTSIDE SERVICES (OUTPATIENT)
Dept: PRIMARY CARE CLINIC | Age: 89
End: 2024-07-08

## 2024-07-08 DIAGNOSIS — Z51.5 HOSPICE CARE PATIENT: Primary | ICD-10-CM

## 2024-07-08 DIAGNOSIS — F32.A DEPRESSION, UNSPECIFIED DEPRESSION TYPE: ICD-10-CM

## 2024-07-08 NOTE — PROGRESS NOTES
antidepressant.            Medication List:    Please see updated med list in nursing home paper chart or PCC      Germán Vasquez, DO       This document may have been prepared at least partially through the use of voice recognition software. Although effort is taken to assure the accuracy ofthis document, it is possible that grammatical, syntax,  or spelling errors may occur.

## 2024-07-29 ENCOUNTER — OUTSIDE SERVICES (OUTPATIENT)
Dept: FAMILY MEDICINE CLINIC | Age: 89
End: 2024-07-29
Payer: MEDICARE

## 2024-07-29 DIAGNOSIS — J39.8 CONGESTION OF UPPER RESPIRATORY TRACT: Primary | ICD-10-CM

## 2024-07-29 DIAGNOSIS — R63.4 WEIGHT LOSS: ICD-10-CM

## 2024-07-29 DIAGNOSIS — I10 HYPERTENSION, UNSPECIFIED TYPE: ICD-10-CM

## 2024-07-29 DIAGNOSIS — F03.90 DEMENTIA WITHOUT BEHAVIORAL DISTURBANCE (HCC): ICD-10-CM

## 2024-07-29 DIAGNOSIS — E03.9 HYPOTHYROIDISM, UNSPECIFIED TYPE: ICD-10-CM

## 2024-07-29 PROBLEM — S72.8X2A OTHER FRACTURE OF LEFT FEMUR, INITIAL ENCOUNTER FOR CLOSED FRACTURE (HCC): Status: RESOLVED | Noted: 2022-03-15 | Resolved: 2024-07-29

## 2024-07-29 PROCEDURE — 99309 SBSQ NF CARE MODERATE MDM 30: CPT | Performed by: NURSE PRACTITIONER

## 2024-07-29 NOTE — PROGRESS NOTES
7/29/2024        Antonioteresa Pollardens  5/23/1934    This resident is being seen today for a follow-up evaluation visit.  He is a resident who has long-term medical conditions including hypertension, hypothyroidism, dementia with some degree of cognitive decline, frequent falls vitamin D deficiency, gout, respiratory failure with hypoxia in times past, rib fractures to the right side, anxiety disorder, major depressive disorder, moderate protein calorie malnutrition, diverticulitis, low back pain,.  He is a 89 y.o. male resident who is being seen today for a follow-up evaluation with which this resident seen in conjunction with hospice services and has been under recent assessment for a  skin tear to his right elbow.  He is a gentleman who continues to follow along with [facility] services and had recent changes with regards to his food with his diet changed to mechanical soft diet due to difficulty with chewing.  He does state at this time that he has had a cough and some increased congestion.  He also has some underlying shortness of breath.  He denies chest pain, nausea or vomiting, constipation or diarrhea, fever or chills, falls or syncopal events.        Medications:  Cyclobenzaprine 10 mg twice daily  Aspirin 81 mg daily  Bisacodyl 5 mg daily as needed  Biofreeze every 12 hours as needed  Dorzolamide 2% 1 drop to both eyes daily  Lumigan ophthalmic solution 0.01% 1 drop to both eyes at bedtime  Hyoscyamine 0.125 mg every 6 hours as needed  Morphine 0.25 mL every 2 hours as needed  Acetaminophen rectally as needed  Tylenol 1000 mg 3 times daily  Nystatin powder to the right neck every shift        Objective     Vital Signs: /63 pulse 70 respirations 16 temperature 97.8 O2 93% weight 112 pounds      Physical examination:Skin is essentially warm and dry.  He does have a notable area to his forehead from a previous laceration without infectious etiology.  HEENT unremarkable. Neck is supple. Heart regular rate

## 2024-08-05 ENCOUNTER — OUTSIDE SERVICES (OUTPATIENT)
Dept: PRIMARY CARE CLINIC | Age: 89
End: 2024-08-05
Payer: MEDICARE

## 2024-08-05 DIAGNOSIS — F32.A DEPRESSION, UNSPECIFIED DEPRESSION TYPE: ICD-10-CM

## 2024-08-05 DIAGNOSIS — Z51.5 HOSPICE CARE PATIENT: Primary | ICD-10-CM

## 2024-08-05 PROCEDURE — 99308 SBSQ NF CARE LOW MDM 20: CPT | Performed by: STUDENT IN AN ORGANIZED HEALTH CARE EDUCATION/TRAINING PROGRAM

## 2024-08-05 NOTE — PROGRESS NOTES
[facility]  Nursing [place] Progress Note       Patient:  Antonio Cantor 89 y.o. male     Date of Service: 24  : 1934    Patient states he is doing okay.  He denies any pain.  He denies any fever or chills.      Past Medical History:      Diagnosis Date    Dementia (HCC)     Gout     Hypotension     Hypothyroidism     Other fracture of left femur, initial encounter for closed fracture (HCC) 03/15/2022       PastSurgical History:        Procedure Laterality Date    FEMUR FRACTURE SURGERY Left 3/16/2022    LEFT FEMUR PERIPROSTHETIC FRACTURE OPEN REDUCTION INTERNAL FIXATION performed by Richie Melvin DO at [facility] [place]       Allergies:    Patient has no known allergies.      Review of Systems:   Review of Systems - as above     Physical Exam   Vitals: Stable   Physical Exam  Constitutional:       Appearance: He is well-developed.   HENT:      Head: Normocephalic and atraumatic.   Eyes:      General:         Right eye: No discharge.         Left eye: No discharge.      Conjunctiva/sclera: Conjunctivae normal.   Neck:      Trachea: No tracheal deviation.   Cardiovascular:      Rate and Rhythm: Normal rate and regular rhythm.      Heart sounds: Murmur heard.   Pulmonary:      Effort: Pulmonary effort is normal. No respiratory distress.      Breath sounds: No wheezing.   Abdominal:      General: Bowel sounds are normal. There is no distension.      Tenderness: There is no abdominal tenderness.   Skin:     General: Skin is warm and dry.   Neurological:      Mental Status: He is alert.             Assessment and Plan       1. Hospice care patient    2. Depression, unspecified depression type      Patient doing ok, no concerns.  Vitals stable, will continue to follow along with hospice.                Medication List:    Please see updated med list in nursing home paper chart or PCC      Germán Vasquez DO       This document may have been prepared at least partially through

## 2024-08-12 ENCOUNTER — OUTSIDE SERVICES (OUTPATIENT)
Dept: PRIMARY CARE CLINIC | Age: 89
End: 2024-08-12
Payer: MEDICARE

## 2024-08-12 DIAGNOSIS — I95.9 HYPOTENSION, UNSPECIFIED HYPOTENSION TYPE: ICD-10-CM

## 2024-08-12 DIAGNOSIS — R51.9 NONINTRACTABLE HEADACHE, UNSPECIFIED CHRONICITY PATTERN, UNSPECIFIED HEADACHE TYPE: ICD-10-CM

## 2024-08-12 DIAGNOSIS — Z51.5 HOSPICE CARE PATIENT: Primary | ICD-10-CM

## 2024-08-12 PROCEDURE — 99308 SBSQ NF CARE LOW MDM 20: CPT | Performed by: STUDENT IN AN ORGANIZED HEALTH CARE EDUCATION/TRAINING PROGRAM

## 2024-08-12 NOTE — PROGRESS NOTES
feeling short of breath.  Lungs do sound clear to auscultation bilaterally which is reassuring.  Will follow along with hospice and ensure patient is comfortable.                 Medication List:    Please see updated med list in nursing home paper chart or PCC      Germán Vasquez,        This document may have been prepared at least partially through the use of voice recognition software. Although effort is taken to assure the accuracy ofthis document, it is possible that grammatical, syntax,  or spelling errors may occur.

## 2024-08-19 ENCOUNTER — OUTSIDE SERVICES (OUTPATIENT)
Dept: FAMILY MEDICINE CLINIC | Age: 89
End: 2024-08-19
Payer: MEDICARE

## 2024-08-19 DIAGNOSIS — G47.00 INSOMNIA, UNSPECIFIED TYPE: Primary | ICD-10-CM

## 2024-08-19 DIAGNOSIS — M81.0 OSTEOPOROSIS, UNSPECIFIED OSTEOPOROSIS TYPE, UNSPECIFIED PATHOLOGICAL FRACTURE PRESENCE: ICD-10-CM

## 2024-08-19 DIAGNOSIS — F05 SUNDOWN SYNDROME: ICD-10-CM

## 2024-08-19 DIAGNOSIS — E55.9 VITAMIN D DEFICIENCY: ICD-10-CM

## 2024-08-19 DIAGNOSIS — K59.00 CONSTIPATION, UNSPECIFIED CONSTIPATION TYPE: ICD-10-CM

## 2024-08-19 PROCEDURE — 99309 SBSQ NF CARE MODERATE MDM 30: CPT | Performed by: NURSE PRACTITIONER

## 2024-08-19 NOTE — PROGRESS NOTES
supple and non-tender.  Bowel sounds are noted x4 quadrants. No rigidity, guarding or rebound tenderness.  Negative Nelson's, negative McBurney's, negative Rajendra's.  Extremities; no true pitting edema.  Pulses are adequate. No clubbing  or no cyanosis noted.  He does have some modest motion restriction with respect to the shoulders with abduction and extension.  There is some degree of venous insufficiency to the lower extremities, without appreciable change.   Neurologically he  is alert and oriented x2.  No evidence of paralysis or paresthesias noted.    Diagnoses and all orders for this visit:    Insomnia, unspecified type  Comments:  Controlled and asymptomatic    Vitamin D deficiency  Comments:  Currently stable with palliative care    Constipation, unspecified constipation type  Comments:  Currently controlled    SunDown syndrome  Comments:  Currently stable    Osteoporosis, unspecified osteoporosis type, unspecified pathological fracture presence  Comments:  Currently stable at this time and does maintain palliative care                                                         Plan: Plan of care was discussed with the healthcare team with meds and labs reviewed.  This resident has not had any labs completed since September 2023 given the fact that he is currently seen in conjunction with hospice services.  He has no complaints at this time and does continue with the benefit of morphine as needed.  He will therefore continue with his current recommendations for a low loss air mattress along with his regular diet with frozen nutritional supplement and house supplement and I will see her routinely and as needed with further orders forthcoming.      VIRGINIA BANDA, CHARITY - CNP      *Note was creating using voice recognition software.  The document was reviewed however grammatical errors may exist.

## 2024-08-26 ENCOUNTER — OUTSIDE SERVICES (OUTPATIENT)
Dept: PRIMARY CARE CLINIC | Age: 89
End: 2024-08-26
Payer: MEDICARE

## 2024-08-26 DIAGNOSIS — Z51.5 HOSPICE CARE PATIENT: ICD-10-CM

## 2024-08-26 DIAGNOSIS — R14.0 ABDOMINAL DISTENTION: Primary | ICD-10-CM

## 2024-08-26 PROCEDURE — 99308 SBSQ NF CARE LOW MDM 20: CPT | Performed by: STUDENT IN AN ORGANIZED HEALTH CARE EDUCATION/TRAINING PROGRAM

## 2024-08-26 NOTE — PROGRESS NOTES
[facility]  Nursing Home Progress Note       Patient:  Antonio Cantor 89 y.o. male     Date of Service: 24  : 1934    Patient seen per nursing request.  Per nursing, patient has had abdominal pain and was distended.  Per patient, he was having abdominal pain, unclear the last time he had a bowel movement.      Past Medical History:      Diagnosis Date    Dementia (HCC)     Gout     Hypotension     Hypothyroidism     Other fracture of left femur, initial encounter for closed fracture (HCC) 03/15/2022       PastSurgical History:        Procedure Laterality Date    FEMUR FRACTURE SURGERY Left 3/16/2022    LEFT FEMUR PERIPROSTHETIC FRACTURE OPEN REDUCTION INTERNAL FIXATION performed by Richie Melvin DO at [facility] [place]       Allergies:    Patient has no known allergies.      Review of Systems:   Review of Systems - as above     Physical Exam   Vitals: Stable   Physical Exam  Constitutional:       Appearance: He is well-developed.   HENT:      Head: Normocephalic and atraumatic.   Eyes:      General:         Right eye: No discharge.         Left eye: No discharge.      Conjunctiva/sclera: Conjunctivae normal.   Neck:      Trachea: No tracheal deviation.   Cardiovascular:      Rate and Rhythm: Normal rate and regular rhythm.      Heart sounds: Murmur heard.   Pulmonary:      Effort: Pulmonary effort is normal. No respiratory distress.      Breath sounds: No wheezing.   Abdominal:      General: Bowel sounds are normal. There is distension.      Tenderness: There is abdominal tenderness (General, mild).   Skin:     General: Skin is warm and dry.   Neurological:      Mental Status: He is alert.             Assessment and Plan       1. Abdominal distention    2. Hospice care patient      Patient having abdominal pain and distention will get KUB if okay with hospice.  Will follow-up with hospice and ensure patient is comfortable.      Medication List:    Please see updated med

## (undated) DEVICE — GUIDEWIRE ORTH DIA2.5MM LOK SMOOTH DRL TIP FLX THRD FOR

## (undated) DEVICE — BIT DRL L145MM DIA4.5MM ST S STL QUIK CPL NONRADIOPAQUE W/O

## (undated) DEVICE — GLOVE ORTHO 8   MSG9480

## (undated) DEVICE — SOLUTION IRRIG 3000ML 0.9% SOD CHL USP UROMATIC PLAS CONT

## (undated) DEVICE — PADDING CAST W6INXL4YD COT LO LINTING WYTEX

## (undated) DEVICE — DRAPE,REIN 53X77,STERILE: Brand: MEDLINE

## (undated) DEVICE — SPLINT KNEE UNIV FOR LESS THAN 36IN L24IN FOAM LAM E CNTCT

## (undated) DEVICE — GLOVE ORANGE PI 8   MSG9080

## (undated) DEVICE — LOWER EXT HIP DRAPE: Brand: MEDLINE INDUSTRIES, INC.

## (undated) DEVICE — DRESSING,GAUZE,XEROFORM,CURAD,5"X9",ST: Brand: CURAD

## (undated) DEVICE — GOWN,AURORA,BRTHSLV,2XL,18/CS: Brand: MEDLINE

## (undated) DEVICE — BIT DRL L145MM DIA3.2MM ST QUIK CPL W/O STP REUSE

## (undated) DEVICE — BIT DRL L300MM DIA3.2MM PERC QUIK CPL CALIB W/O STP REUSE

## (undated) DEVICE — BIT DRL L300MM DIA4.3MM QUIK CPL PERC CALIB W/O STP REUSE

## (undated) DEVICE — Z DISCONTINUED PER MEDLINE USE 2741944 DRESSING AQUACEL 12 IN SURG W9XL30CM SIL CVR WTRPRF VIR BACT BARR ANTIMIC

## (undated) DEVICE — SCREW BNE L36MM DIA4.5MM PROX CORT TIB S STL ST LOK FULL
Type: IMPLANTABLE DEVICE | Site: FEMUR | Status: NON-FUNCTIONAL
Removed: 2022-03-16